# Patient Record
Sex: MALE | Race: WHITE | NOT HISPANIC OR LATINO | Employment: FULL TIME | URBAN - METROPOLITAN AREA
[De-identification: names, ages, dates, MRNs, and addresses within clinical notes are randomized per-mention and may not be internally consistent; named-entity substitution may affect disease eponyms.]

---

## 2018-10-02 ENCOUNTER — TRANSFERRED RECORDS (OUTPATIENT)
Dept: HEALTH INFORMATION MANAGEMENT | Facility: CLINIC | Age: 53
End: 2018-10-02

## 2018-11-07 ENCOUNTER — TRANSFERRED RECORDS (OUTPATIENT)
Dept: HEALTH INFORMATION MANAGEMENT | Facility: CLINIC | Age: 53
End: 2018-11-07

## 2018-12-05 ENCOUNTER — PRE VISIT (OUTPATIENT)
Dept: ORTHOPEDICS | Facility: CLINIC | Age: 53
End: 2018-12-05

## 2018-12-05 NOTE — TELEPHONE ENCOUNTER
FUTURE VISIT INFORMATION      FUTURE VISIT INFORMATION:    Date: 12/10    Time: 11:00    Location: [facility]  REFERRAL INFORMATION:    Referring provider:      Referring providers clinic:      Reason for visit/diagnosis  L knee    RECORDS REQUESTED FROM:       Clinic name Comments Records Status Imaging Status   [facility]  received Pt will hand carry imaging                                     12/5- called pt and left vm at 1:30 regarding appt since there are no appt notes  Pt returned phone call at 2:05. Spoke to pt regarding his appt. He states he has a long history of L knee issues and coming in for a second opinion. He has records from [place] that he will fax in and an imaging disc that he just received and will bring with him.    RECORDS RECEIVED FROM: [facility]   DATE RECEIVED: 12/5   NOTES STATUS DETAILS   OFFICE NOTE from referring provider Received 11/7/18, 9/25/18, 5/29/14   INJECTIONS DONE IN RADIOLOGY N/A    MRI Received 10/2/18   CT SCAN N/A    XRAYS (IMAGES & REPORTS) Received 5/29/14, 9/25/18   TUMOR     PATHOLOGY  Slides & report N/A

## 2018-12-10 ENCOUNTER — OFFICE VISIT (OUTPATIENT)
Dept: ORTHOPEDICS | Facility: CLINIC | Age: 53
End: 2018-12-10
Payer: COMMERCIAL

## 2018-12-10 VITALS — WEIGHT: 170 LBS | BODY MASS INDEX: 25.18 KG/M2 | HEIGHT: 69 IN

## 2018-12-10 DIAGNOSIS — M25.562 CHRONIC PAIN OF LEFT KNEE: Primary | ICD-10-CM

## 2018-12-10 DIAGNOSIS — G89.29 CHRONIC PAIN OF LEFT KNEE: Primary | ICD-10-CM

## 2018-12-10 RX ORDER — SIMVASTATIN 10 MG
TABLET ORAL
COMMUNITY
Start: 2018-11-28 | End: 2019-10-28

## 2018-12-10 RX ORDER — LIDOCAINE HYDROCHLORIDE 5 MG/ML
8 INJECTION, SOLUTION INFILTRATION; INTRAVENOUS
Status: DISCONTINUED | OUTPATIENT
Start: 2018-12-10 | End: 2019-10-28

## 2018-12-10 RX ORDER — TRIAMCINOLONE ACETONIDE 40 MG/ML
40 INJECTION, SUSPENSION INTRA-ARTICULAR; INTRAMUSCULAR
Status: DISCONTINUED | OUTPATIENT
Start: 2018-12-10 | End: 2019-10-28

## 2018-12-10 RX ADMIN — LIDOCAINE HYDROCHLORIDE 8 ML: 5 INJECTION, SOLUTION INFILTRATION; INTRAVENOUS at 11:33

## 2018-12-10 RX ADMIN — TRIAMCINOLONE ACETONIDE 40 MG: 40 INJECTION, SUSPENSION INTRA-ARTICULAR; INTRAMUSCULAR at 11:33

## 2018-12-10 ASSESSMENT — ENCOUNTER SYMPTOMS
TASTE DISTURBANCE: 0
SMELL DISTURBANCE: 0
NECK MASS: 0

## 2018-12-10 ASSESSMENT — MIFFLIN-ST. JEOR: SCORE: 1606.49

## 2018-12-10 NOTE — PROGRESS NOTES
Large Joint Injection/Arthocentesis: L knee joint  Date/Time: 12/10/2018 11:33 AM  Performed by: Bobby Underwood MD  Authorized by: Bobby Underwood MD     Needle Size:  22 G  Guidance: landmark guided    Approach:  Anterolateral  Location:  Knee  Site:  L knee joint  Medications:  40 mg triamcinolone 40 MG/ML; 8 mL lidocaine (PF) 0.5 %  Outcome:  Tolerated well, no immediate complications  Procedure discussed: discussed risks, benefits, and alternatives    Timeout: timeout called immediately prior to procedure    Prep: patient was prepped and draped in usual sterile fashion     Scribed by Jessica Watt ATC for Dr. Underwood on 12/10/18 at 11:30AM, based on the provider s statements to me.

## 2018-12-10 NOTE — NURSING NOTE
[address] [address]  Dept: [phone]  ______________________________________________________________________________    Patient: Lasha Sims   : 1965   MRN: [id number]   December 10, 2018    INVASIVE PROCEDURE SAFETY CHECKLIST    Date: 12/10/18   Procedure: Left knee CSI with Kenalog  Patient Name: Lasha Sims  MRN: [id number]  YOB: 1965    Action: Complete sections as appropriate. Any discrepancy results in a HARD COPY until resolved.     PRE PROCEDURE:  Patient ID verified with 2 identifiers (name and  or MRN): Yes  Procedure and site verified with patient/designee (when able): Yes  Accurate consent documentation in medical record: Yes  H&P (or appropriate assessment) documented in medical record: Yes  H&P must be up to 20 days prior to procedure and updates within 24 hours of procedure as applicable: NA  Relevant diagnostic and radiology test results appropriately labeled and displayed as applicable: Yes  Procedure site(s) marked with provider initials: NA    TIMEOUT:  Time-Out performed immediately prior to starting procedure, including verbal and active participation of all team members addressing the following:Yes  * Correct patient identify  * Confirmed that the correct side and site are marked  * An accurate procedure consent form  * Agreement on the procedure to be done  * Correct patient position  * Relevant images and results are properly labeled and appropriately displayed  * The need to administer antibiotics or fluids for irrigation purposes during the procedure as applicable   * Safety precautions based on patient history or medication use    DURING PROCEDURE: Verification of correct person, site, and procedures any time the responsibility for care of the patient is transferred to another member of the care team.     Prior to injection, verified patient identity using patient's name and date of  birth.  Due to injection administration, patient instructed to remain in clinic for 15 minutes  afterwards, and to report any adverse reaction to me immediately.    Joint injection was performed.      Drug Amount Wasted:  42 of 50mg Lidocaine wasted  Vial/Syringe: Single dose vial        Jessica Watt, DORITA  December 10, 2018

## 2018-12-10 NOTE — PROGRESS NOTES
"CHIEF CONCERN: Left knee pain    HISTORY:   Very pleasant 53-year-old male with a long history regarding his left knee he underwent a bone tendon bone autograft ACL reconstruction in 2003 he recovered and did very well from the surgery.  And has been very functional for the last 14 or 15 years.  He notes a one-year history of increasing knee pain.  He ultimately discussed his case with an outside orthopedic provider who diagnosed him with both osteoarthritis is well as a meniscus tear discussion regarding injections as well as therapy was had though he never did have an injection.  He ultimately obtained an MRI.  And we presents to my clinic today to get a second opinion about what can be done for his knee.  He enjoys a very active and healthy lifestyle and he wants to promote the long-term health of his knee.  His SANE score is an 85 in regards to function in a 60 in regards to pain.  He does not let his knee pain stop him but he does \"pay for it\" after an active workout session    PAST MEDICAL HISTORY: (Reviewed with the patient and in the EPIC medical record)  High cholesterol  Anxiety    PAST SURGICAL HISTORY: (Reviewed with the patient and in the EPIC medical record)  1. ACL reconstruction in 2003    MEDICATIONS: (Reviewed with the patient and in the EPIC medical record)    2. Notable medications include:   Zocor  Fluoxetine    ALLERGIES: (Reviewed with the patient and in the EPIC medical record)  1. 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  He is very active.    FAMILY HISTORY: (Reviewed with the patient and in the medical record)  -- No family history of bleeding, clotting, or difficulty with anesthesia        REVIEW OF SYSTEMS: (Reviewed with the patient and on the health intake form)  -- A comprehensive 10 point " review of systems was conducted and is negative except as noted in the HPI    EXAM:     General: Awake, Alert and Oriented, No acute Distress. Articulate and Interactive    Body mass index is 25.1 kg/m .    Left lower extremity :    Skin is Warm and Well perfused, no suggestion of infection    Well-healed surgical incisions from her BTB ACL reconstruction    No significant effusion today though admits it does get swollen    Trace medial and lateral joint line tenderness.  Does not localize    0-145 degrees    Stable to varus and valgus stress testing stable anterior and posterior drawer.  Lachman 1A with increased translation firm endpoint.  No pivot shift    EHL/FHL/TA/GS 5/5    Sensation intact L3-S1    2+ Dorsalis Pedis Pulse         IMAGING:    Plain Radiographs: AP and lateral radiograph show well-maintained joint spaces tunnels from ACL reconstruction are noted    MRI: MRI does showed some diffuse tricompartmental chondrosis most involve the patellofemoral compartment.  Intrasubstance degeneration is noted the medial meniscus though no displaced fragments.  ACL graft is intact.    ASSESSMENT:  1. Early tricompartmental arthritis  2. Intrasubstance degeneration of the meniscus  3. Intact ACL graft without instability    PLAN:  1. I had a long discussion with the patient I explained these 3 diagnoses and how they potentially could contribute to his knee pain.  2. I understand that he is having some locking catching symptoms and I do not think it is exactly clear what this is coming from  3. I discussed and the importance of keeping his muscles strong, keeping his joints moving keeping his weight at a good level and not smoking  4. I offered him a corticosteroid injection able to try it.  5. He understands that if he does not see lasting benefit we could ultimately consider arthroscopic evaluation the future though would not likely improve the portion of his symptoms coming from his cartilage loss    After  written informed consent obtained and signed, after sufficient prepping and sterile technique, 40 mg of kenalog and , 8 cc of 1% lidocaine were injected without complication into the left knee. The patient tolerated the injection well and a sterile dressing was applied.

## 2018-12-10 NOTE — LETTER
12/10/2018       RE: Lasha Sims  [address]  [place] [facility] [id number]     Dear Colleague,    Thank you for referring your patient, Lasha Sims, to the [facility]. Please see a copy of my visit note below.    Large Joint Injection/Arthocentesis: L knee joint  Date/Time: 12/10/2018 11:33 AM  Performed by: Bobby Underwood MD  Authorized by: Bobby Underwood MD     Needle Size:  22 G  Guidance: landmark guided    Approach:  Anterolateral  Location:  Knee  Site:  L knee joint  Medications:  40 mg triamcinolone 40 MG/ML; 8 mL lidocaine (PF) 0.5 %  Outcome:  Tolerated well, no immediate complications  Procedure discussed: discussed risks, benefits, and alternatives    Timeout: timeout called immediately prior to procedure    Prep: patient was prepped and draped in usual sterile fashion     Scribed by Jessica Watt ATC for Dr. Underwood on 12/10/18 at 11:30AM, based on the provider s statements to me.            CHIEF CONCERN: Left knee pain    HISTORY:   Very pleasant 53-year-old male with a long history regarding his left knee he underwent a bone tendon bone autograft ACL reconstruction in 2003 he recovered and did very well from the surgery.  And has been very functional for the last 14 or 15 years.  He notes a one-year history of increasing knee pain.  He ultimately discussed his case with an outside orthopedic provider who diagnosed him with both osteoarthritis is well as a meniscus tear discussion regarding injections as well as therapy was had though he never did have an injection.  He ultimately obtained an MRI.  And we presents to my clinic today to get a second opinion about what can be done for his knee.  He enjoys a very active and healthy lifestyle and he wants to promote the long-term health of his knee.  His SANE score is an 85 in regards to function in a 60 in regards to pain.  He does not let his knee pain  "stop him but he does \"pay for it\" after an active workout session    PAST MEDICAL HISTORY: (Reviewed with the patient and in the EPIC medical record)  High cholesterol  Anxiety    PAST SURGICAL HISTORY: (Reviewed with the patient and in the EPIC medical record)  1. ACL reconstruction in 2003    MEDICATIONS: (Reviewed with the patient and in the [facility] medical record)    2. Notable medications include:   Zocor  Fluoxetine    ALLERGIES: (Reviewed with the patient and in the EPIC medical record)  1. 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  He is very active.    FAMILY HISTORY: (Reviewed with the patient and in the medical record)  -- No family history of bleeding, clotting, or difficulty with anesthesia        REVIEW OF SYSTEMS: (Reviewed with the patient and on the health intake form)  -- A comprehensive 10 point review of systems was conducted and is negative except as noted in the HPI    EXAM:     General: Awake, Alert and Oriented, No acute Distress. Articulate and Interactive    Body mass index is 25.1 kg/m .    Left lower extremity :    Skin is Warm and Well perfused, no suggestion of infection    Well-healed surgical incisions from her BTB ACL reconstruction    No significant effusion today though admits it does get swollen    Trace medial and lateral joint line tenderness.  Does not localize    0-145 degrees    Stable to varus and valgus stress testing stable anterior and posterior drawer.  Lachman 1A with increased translation firm endpoint.  No pivot shift    EHL/FHL/TA/GS 5/5    Sensation intact L3-S1    2+ Dorsalis Pedis Pulse         IMAGING:    Plain Radiographs: AP and lateral radiograph show well-maintained joint spaces tunnels from ACL reconstruction are noted    MRI: MRI does showed some diffuse " tricompartmental chondrosis most involve the patellofemoral compartment.  Intrasubstance degeneration is noted the medial meniscus though no displaced fragments.  ACL graft is intact.    ASSESSMENT:  1. Early tricompartmental arthritis  2. Intrasubstance degeneration of the meniscus  3. Intact ACL graft without instability    PLAN:  1. I had a long discussion with the patient I explained these 3 diagnoses and how they potentially could contribute to his knee pain.  2. I understand that he is having some locking catching symptoms and I do not think it is exactly clear what this is coming from  3. I discussed and the importance of keeping his muscles strong, keeping his joints moving keeping his weight at a good level and not smoking  4. I offered him a corticosteroid injection able to try it.  5. He understands that if he does not see lasting benefit we could ultimately consider arthroscopic evaluation the future though would not likely improve the portion of his symptoms coming from his cartilage loss    After written informed consent obtained and signed, after sufficient prepping and sterile technique, 40 mg of kenalog and , 8 cc of 1% lidocaine were injected without complication into the left knee. The patient tolerated the injection well and a sterile dressing was applied.      Again, thank you for allowing me to participate in the care of your patient.      Sincerely,    Bobby Underwood MD

## 2019-05-01 ENCOUNTER — DOCUMENTATION ONLY (OUTPATIENT)
Dept: CARE COORDINATION | Facility: CLINIC | Age: 54
End: 2019-05-01

## 2019-05-06 ENCOUNTER — OFFICE VISIT (OUTPATIENT)
Dept: ORTHOPEDICS | Facility: CLINIC | Age: 54
End: 2019-05-06
Payer: COMMERCIAL

## 2019-05-06 DIAGNOSIS — G89.29 CHRONIC PAIN OF LEFT KNEE: Primary | ICD-10-CM

## 2019-05-06 DIAGNOSIS — M25.562 CHRONIC PAIN OF LEFT KNEE: Primary | ICD-10-CM

## 2019-05-06 RX ORDER — LIDOCAINE HYDROCHLORIDE 5 MG/ML
8 INJECTION, SOLUTION INFILTRATION; INTRAVENOUS
Status: DISCONTINUED | OUTPATIENT
Start: 2019-05-06 | End: 2019-10-28

## 2019-05-06 RX ORDER — TRIAMCINOLONE ACETONIDE 40 MG/ML
40 INJECTION, SUSPENSION INTRA-ARTICULAR; INTRAMUSCULAR
Status: DISCONTINUED | OUTPATIENT
Start: 2019-05-06 | End: 2019-10-28

## 2019-05-06 RX ADMIN — TRIAMCINOLONE ACETONIDE 40 MG: 40 INJECTION, SUSPENSION INTRA-ARTICULAR; INTRAMUSCULAR at 07:42

## 2019-05-06 RX ADMIN — LIDOCAINE HYDROCHLORIDE 8 ML: 5 INJECTION, SOLUTION INFILTRATION; INTRAVENOUS at 07:42

## 2019-05-06 NOTE — PROGRESS NOTES
53-year-old male returns to my clinic today for interval follow-up I performed a corticosteroid injection on him in December 2018.  He states that the injection was 'magical' or at least it made his knee feel this way.  Overall he is very happy with it he like a repeat injection today.    Examination shows a pleasant male no acute distress he is articulate and interactive.  He has a trace effusion.  He has reasonable motion.  His ligaments are stable.  He is neurovascularly intact.    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  In light of this I offered him a repeat injection.  He understands that he can have 3 to 4 injections/year.  No lifetime maximum.  He would like to proceed with this.    After written informed consent obtained and signed, after sufficient prepping and sterile technique, 40 mg of kenalog and , 8 cc of 1% lidocaine were injected without complication into the left knee. The patient tolerated the injection well and a sterile dressing was applied.

## 2019-05-06 NOTE — PROGRESS NOTES
Large Joint Injection/Arthocentesis: L knee joint  Date/Time: 5/6/2019 7:42 AM  Performed by: Bobby Underwood MD  Authorized by: Bobby Underwood MD     Needle Size:  22 G  Guidance: landmark guided    Approach:  Anterolateral  Location:  Knee      Medications:  8 mL lidocaine (PF) 0.5 %; 40 mg triamcinolone 40 MG/ML  Outcome:  Tolerated well, no immediate complications  Procedure discussed: discussed risks, benefits, and alternatives    Consent Given by:  Patient  Timeout: timeout called immediately prior to procedure    Prep: patient was prepped and draped in usual sterile fashion     Scribed by Jessica Watt ATC for Dr. Underwood on 5/6/19 at 7:45AM, based on the provider's statements to me.

## 2019-05-06 NOTE — NURSING NOTE
[address] [address]  Dept: [phone]  ______________________________________________________________________________    Patient: Lasha Sims   : 1965   MRN: [id number]   May 6, 2019    INVASIVE PROCEDURE SAFETY CHECKLIST    Date: 19   Procedure: Left knee CSI with Kenalog  Patient Name: Lasha Sims  MRN: [id number]  YOB: 1965    Action: Complete sections as appropriate. Any discrepancy results in a HARD COPY until resolved.     PRE PROCEDURE:  Patient ID verified with 2 identifiers (name and  or MRN): Yes  Procedure and site verified with patient/designee (when able): Yes  Accurate consent documentation in medical record: Yes  H&P (or appropriate assessment) documented in medical record: Yes  H&P must be up to 20 days prior to procedure and updates within 24 hours of procedure as applicable: NA  Relevant diagnostic and radiology test results appropriately labeled and displayed as applicable: Yes  Procedure site(s) marked with provider initials: NA    TIMEOUT:  Time-Out performed immediately prior to starting procedure, including verbal and active participation of all team members addressing the following:Yes  * Correct patient identify  * Confirmed that the correct side and site are marked  * An accurate procedure consent form  * Agreement on the procedure to be done  * Correct patient position  * Relevant images and results are properly labeled and appropriately displayed  * The need to administer antibiotics or fluids for irrigation purposes during the procedure as applicable   * Safety precautions based on patient history or medication use    DURING PROCEDURE: Verification of correct person, site, and procedures any time the responsibility for care of the patient is transferred to another member of the care team.       Prior to injection, verified patient identity using patient's name and date of birth.  Due to  injection administration, patient instructed to remain in clinic for 15 minutes  afterwards, and to report any adverse reaction to me immediately.    Joint injection was performed.      Drug Amount Wasted:  42 of 50mg Lidocaine  Vial/Syringe: Single dose vial  Expiration Date:  4/1/20      Jessica Watt, ATC  May 6, 2019

## 2019-07-15 ENCOUNTER — OFFICE VISIT (OUTPATIENT)
Dept: ORTHOPEDICS | Facility: CLINIC | Age: 54
End: 2019-07-15
Payer: COMMERCIAL

## 2019-07-15 DIAGNOSIS — M25.562 CHRONIC PAIN OF LEFT KNEE: Primary | ICD-10-CM

## 2019-07-15 DIAGNOSIS — G89.29 CHRONIC PAIN OF LEFT KNEE: Primary | ICD-10-CM

## 2019-07-15 RX ORDER — HYALURONATE SODIUM 10 MG/ML
20 SYRINGE (ML) INTRAARTICULAR WEEKLY
Qty: 2 ML | Refills: 0 | OUTPATIENT
Start: 2019-07-15 | End: 2019-10-28

## 2019-07-15 RX ORDER — DICLOFENAC SODIUM 75 MG/1
75 TABLET, DELAYED RELEASE ORAL 2 TIMES DAILY
Qty: 60 TABLET | Refills: 0 | Status: SHIPPED | OUTPATIENT
Start: 2019-07-15 | End: 2019-09-30

## 2019-07-15 NOTE — NURSING NOTE
Reason For Visit:   Chief Complaint   Patient presents with     RECHECK     Left knee     CSI on 12/10/18 with tremendous relief.  CSI on 5/6/19 with no relief.  Wants to know what his next options are.     Pain Assessment  Patient Currently in Pain: Yes  0-10 Pain Scale: 3  Primary Pain Location: Knee    There were no vitals taken for this visit.       No Known Allergies    Current Outpatient Medications   Medication     FLUoxetine (PROZAC) 20 MG capsule     simvastatin (ZOCOR) 10 MG tablet     Current Facility-Administered Medications   Medication     lidocaine (PF) 0.5 % injection SOLN 8 mL     lidocaine (PF) 0.5 % injection SOLN 8 mL     triamcinolone (KENALOG-40) injection 40 mg     triamcinolone (KENALOG-40) injection 40 mg         Jessica Watt, ATC

## 2019-07-15 NOTE — LETTER
7/15/2019       RE: Lasha Sims  [address]  [place] [place] [address]     Dear Colleague,    Thank you for referring your patient, Lasha Sims, to the [facility] at [facility]. Please see a copy of my visit note below.    DIAGNOSIS:   1. Early tricompartmental arthritis   2. Intrasubstance degeneration of the medial meniscus  3. Intact ACL graft without instability    PROCEDURES: no procedures performed today    HISTORY:  Mr. Sims is a 54-year old man with a history of L knee ACL reconstruction in 2003 with BTB patellar graft here today for follow-up on his L knee arthritis, which has been symptomatic for the last three years. Briefly, he has received corticosteroid injections for early tricompartmental arthritis on 12/10/2018 and 5/06/2019. He states the injection in December was extremely helpful, with pain relief lasting for 3 months. His second injection in May did not provide any relief. Since May, he has had persistent and severe pain. He is able to complete his crossfit workouts without issue, but after resting, he experiences excruciating pain on the posterior leg, lateral knee shooting town the tibia, and medial knee. His pain is exacerbated by standing from a seated position. He has been taking 600mg ibuprofen and occasional naproxen, which helps occasionally. Otherwise, he denies changes to his health history. He is here today to speak to Dr. Underwood regarding other options for treatment of his knee pain, as he strongly desires to remain active with crossfit for years to come.     EXAM:     General: Awake, Alert, and oriented. Articulates and communicates with a normal affect     Left Lower Extremity:    Previous incisions well healed    No erythema or effusion appreciated    Tender to palpation along the medial joint line. Painless ROM from 0 to 115 degrees. Patella nontender to palpation. No patellar maltracking.     Lachman and posterior drawer  within normal limits. Stable to varus/valgus stress. Negative McMurrays.     Neurovascularly intact. Ambulates without difficulty.    IMAGING:  Plain Radiographs from 2018: AP and lateral radiograph show well-maintained joint spaces tunnels from ACL reconstruction are noted     MRI from 2018: MRI  shows diffuse tricompartmental chondrosis mostly involving the patellofemoral compartment.  Intrasubstance degeneration is noted the medial meniscus though no displaced fragments.  ACL graft is intact.    ASSESSMENT:  1. Tricompartmental OA  2. Intrasubstance degeneration of medial meniscus  3. Stable ACL graft    PLAN:   1. For his increased pain, we have provided a prescription for diclofenac today. He was instructed to take this twice daily for 2 weeks, and then as needed  2. Because it is too early to do a steroid injection today, we will plan to see him back in approximately 1 month if the diclofenac does not control his pain. We can plan for an injection at this time, and if steroid injection does not help, we will not pursue this any further.  3. If steroid injections and diclofenac fail to control his symptoms, we will pursue additional therapies. We specifically discussed Hyaluronic Acid injections as a possible future option. A prior authorization for Euflexxa was submitted today. Additionally, we discussed PRP injections as an option down the road  4. He should continue his crossfit as tolerated, but recognize that activity modification is a significant component of pain management moving forward.           Again, thank you for allowing me to participate in the care of your patient.      Sincerely,    Bobby Underwood MD

## 2019-07-15 NOTE — PROGRESS NOTES
DIAGNOSIS:   1. Early tricompartmental arthritis   2. Intrasubstance degeneration of the medial meniscus  3. Intact ACL graft without instability    PROCEDURES: no procedures performed today    HISTORY:  Mr. Sims is a 54-year old man with a history of L knee ACL reconstruction in 2003 with BTB patellar graft here today for follow-up on his L knee arthritis, which has been symptomatic for the last three years. Briefly, he has received corticosteroid injections for early tricompartmental arthritis on 12/10/2018 and 5/06/2019. He states the injection in December was extremely helpful, with pain relief lasting for 3 months. His second injection in May did not provide any relief. Since May, he has had persistent and severe pain. He is able to complete his crossfit workouts without issue, but after resting, he experiences excruciating pain on the posterior leg, lateral knee shooting town the tibia, and medial knee. His pain is exacerbated by standing from a seated position. He has been taking 600mg ibuprofen and occasional naproxen, which helps occasionally. Otherwise, he denies changes to his health history. He is here today to speak to Dr. Underwood regarding other options for treatment of his knee pain, as he strongly desires to remain active with crossfit for years to come.     EXAM:     General: Awake, Alert, and oriented. Articulates and communicates with a normal affect     Left Lower Extremity:    Previous incisions well healed    No erythema or effusion appreciated    Tender to palpation along the medial joint line. Painless ROM from 0 to 115 degrees. Patella nontender to palpation. No patellar maltracking.     Lachman and posterior drawer within normal limits. Stable to varus/valgus stress. Negative McMurrays.     Neurovascularly intact. Ambulates without difficulty.    IMAGING:  Plain Radiographs from 2018: AP and lateral radiograph show well-maintained joint spaces tunnels from ACL reconstruction are  noted     MRI from 2018: MRI shows diffuse tricompartmental chondrosis mostly involving the patellofemoral compartment.  Intrasubstance degeneration is noted the medial meniscus though no displaced fragments.  ACL graft is intact.    ASSESSMENT:  1. Tricompartmental OA  2. Intrasubstance degeneration of medial meniscus  3. Stable ACL graft    PLAN:   1. For his increased pain, we have provided a prescription for diclofenac today. He was instructed to take this twice daily for 2 weeks, and then as needed  2. Because it is too early to do a steroid injection today, we will plan to see him back in approximately 1 month if the diclofenac does not control his pain. We can plan for an injection at this time, and if steroid injection does not help, we will not pursue this any further.  3. If steroid injections and diclofenac fail to control his symptoms, we will pursue additional therapies. We specifically discussed Hyaluronic Acid injections as a possible future option. A prior authorization for Euflexxa was submitted today. Additionally, we discussed PRP injections as an option down the road  4. He should continue his crossfit as tolerated, but recognize that activity modification is a significant component of pain management moving forward.

## 2019-09-30 ENCOUNTER — OFFICE VISIT (OUTPATIENT)
Dept: ORTHOPEDICS | Facility: CLINIC | Age: 54
End: 2019-09-30
Payer: COMMERCIAL

## 2019-09-30 DIAGNOSIS — M25.562 CHRONIC PAIN OF LEFT KNEE: ICD-10-CM

## 2019-09-30 DIAGNOSIS — G89.29 CHRONIC PAIN OF LEFT KNEE: ICD-10-CM

## 2019-09-30 RX ORDER — TRIAMCINOLONE ACETONIDE 40 MG/ML
40 INJECTION, SUSPENSION INTRA-ARTICULAR; INTRAMUSCULAR
Status: DISCONTINUED | OUTPATIENT
Start: 2019-09-30 | End: 2019-10-28

## 2019-09-30 RX ORDER — DICLOFENAC SODIUM 75 MG/1
75 TABLET, DELAYED RELEASE ORAL 2 TIMES DAILY
Qty: 60 TABLET | Refills: 0 | Status: SHIPPED | OUTPATIENT
Start: 2019-09-30 | End: 2020-10-20

## 2019-09-30 RX ORDER — LIDOCAINE HYDROCHLORIDE 5 MG/ML
8 INJECTION, SOLUTION INFILTRATION; INTRAVENOUS
Status: DISCONTINUED | OUTPATIENT
Start: 2019-09-30 | End: 2019-10-28

## 2019-09-30 RX ADMIN — LIDOCAINE HYDROCHLORIDE 8 ML: 5 INJECTION, SOLUTION INFILTRATION; INTRAVENOUS at 09:39

## 2019-09-30 RX ADMIN — TRIAMCINOLONE ACETONIDE 40 MG: 40 INJECTION, SUSPENSION INTRA-ARTICULAR; INTRAMUSCULAR at 09:39

## 2019-09-30 NOTE — LETTER
9/30/2019       RE: Lasha Sims  [address]  [facility] [address]     Dear Colleague,    Thank you for referring your patient, Lasha Sims, to the [facility] at [facility]. Please see a copy of my visit note below.          Large Joint Injection/Arthocentesis: L knee joint  Date/Time: 9/30/2019 9:39 AM  Performed by: Bobby Underwood MD  Authorized by: Bobby Underwood MD     Needle Size:  22 G  Guidance: landmark guided    Approach:  Anterolateral  Location:  Knee      Medications:  40 mg triamcinolone 40 MG/ML; 8 mL lidocaine (PF) 0.5 %  Outcome:  Tolerated well, no immediate complications  Procedure discussed: discussed risks, benefits, and alternatives    Consent Given by:  Patient  Timeout: timeout called immediately prior to procedure    Prep: patient was prepped and draped in usual sterile fashion     Scribed by Jessica Watt ATC for Dr. Underwood on 9/30/19 at 9:20AM, based on the provider s statements to me.            Lasha returns to my clinic today for interval follow-up he is a very pleasant 54-year-old male.  I saw him earlier this year.  I told him pretty clearly at that visit that there were not great surgical options for his knee pain.  He does have early tricompartmental arthritis.  He has some degeneration of his meniscus and overall I thought his ACL graft was stable.  So realistically I did not think he needed an ACL reconstruction I was not convinced that an arthroscopic procedure was going to help.  At her last visit we reviewed our surgical and nonsurgical management.  I asked to give a prescription for diclofenac which she is found incredibly helpful along the way.  His prior steroid injection to see me last time was not critically helpful and we made a decision that we would try it one more time since the one that he had before that (in December 2018) was helpful.  He returns to clinic today to get the  corticosteroid injection to his knee.    Examination today shows overall stable needle examination.  Neurovascularly intact.  Trace effusion    Clinical assessment early tricompartmental arthritis 15 years status post ACL reconstruction    Plan:  I gave a refill of his nonsteroidal anti-inflammatory, diclofenac.  And will do a steroid injection today.    After written informed consent obtained and signed, after sufficient prepping and sterile technique, 40 mg of kenalog and , 8 cc of 1% lidocaine were injected without complication into the left knee. The patient tolerated the injection well and a sterile dressing was applied.    If this does not provide him lasting benefit we could consider Visco supplementation or PRP in the future.         Again, thank you for allowing me to participate in the care of your patient.      Sincerely,    Bobby Underwood MD

## 2019-09-30 NOTE — PROGRESS NOTES
Large Joint Injection/Arthocentesis: L knee joint  Date/Time: 9/30/2019 9:39 AM  Performed by: Bobby Underwood MD  Authorized by: Bobby Underwood MD     Needle Size:  22 G  Guidance: landmark guided    Approach:  Anterolateral  Location:  Knee      Medications:  40 mg triamcinolone 40 MG/ML; 8 mL lidocaine (PF) 0.5 %  Outcome:  Tolerated well, no immediate complications  Procedure discussed: discussed risks, benefits, and alternatives    Consent Given by:  Patient  Timeout: timeout called immediately prior to procedure    Prep: patient was prepped and draped in usual sterile fashion     Scribed by Jessica Watt ATC for Dr. Underwood on 9/30/19 at 9:20AM, based on the provider s statements to me.

## 2019-09-30 NOTE — PROGRESS NOTES
Lasha returns to my clinic today for interval follow-up he is a very pleasant 54-year-old male.  I saw him earlier this year.  I told him pretty clearly at that visit that there were not great surgical options for his knee pain.  He does have early tricompartmental arthritis.  He has some degeneration of his meniscus and overall I thought his ACL graft was stable.  So realistically I did not think he needed an ACL reconstruction I was not convinced that an arthroscopic procedure was going to help.  At her last visit we reviewed our surgical and nonsurgical management.  I asked to give a prescription for diclofenac which she is found incredibly helpful along the way.  His prior steroid injection to see me last time was not critically helpful and we made a decision that we would try it one more time since the one that he had before that (in December 2018) was helpful.  He returns to clinic today to get the corticosteroid injection to his knee.    Examination today shows overall stable needle examination.  Neurovascularly intact.  Trace effusion    Clinical assessment early tricompartmental arthritis 15 years status post ACL reconstruction    Plan:  I gave a refill of his nonsteroidal anti-inflammatory, diclofenac.  And will do a steroid injection today.    After written informed consent obtained and signed, after sufficient prepping and sterile technique, 40 mg of kenalog and , 8 cc of 1% lidocaine were injected without complication into the left knee. The patient tolerated the injection well and a sterile dressing was applied.    If this does not provide him lasting benefit we could consider Visco supplementation or PRP in the future.

## 2019-09-30 NOTE — NURSING NOTE
[address] [address]  Dept: [phone]  ______________________________________________________________________________    Patient: Lasha Sims   : 1965   MRN: [id number]   2019    INVASIVE PROCEDURE SAFETY CHECKLIST    Date: 19   Procedure: Left knee CSI with Kenalog  Patient Name: Lasha Sims  MRN: [id number]  YOB: 1965    Action: Complete sections as appropriate. Any discrepancy results in a HARD COPY until resolved.     PRE PROCEDURE:  Patient ID verified with 2 identifiers (name and  or MRN): Yes  Procedure and site verified with patient/designee (when able): Yes  Accurate consent documentation in medical record: Yes  H&P (or appropriate assessment) documented in medical record: Yes  H&P must be up to 20 days prior to procedure and updates within 24 hours of procedure as applicable: NA  Relevant diagnostic and radiology test results appropriately labeled and displayed as applicable: Yes  Procedure site(s) marked with provider initials: NA    TIMEOUT:  Time-Out performed immediately prior to starting procedure, including verbal and active participation of all team members addressing the following:Yes  * Correct patient identify  * Confirmed that the correct side and site are marked  * An accurate procedure consent form  * Agreement on the procedure to be done  * Correct patient position  * Relevant images and results are properly labeled and appropriately displayed  * The need to administer antibiotics or fluids for irrigation purposes during the procedure as applicable   * Safety precautions based on patient history or medication use    DURING PROCEDURE: Verification of correct person, site, and procedures any time the responsibility for care of the patient is transferred to another member of the care team.       Prior to injection, verified patient identity using patient's name and date of  birth.  Due to injection administration, patient instructed to remain in clinic for 15 minutes  afterwards, and to report any adverse reaction to me immediately.    Joint injection was performed.      Drug Amount Wasted:  42 of 50mg Lidocaine wasted  Vial/Syringe: Single dose vial  Expiration Date:  11/22      Jessica Watt [facility]  September 30, 2019

## 2019-10-01 NOTE — NURSING NOTE
Received fax from [employer] requesting 90- day supply for the diclofenac. Okayed 90 day supply with no refills.

## 2019-10-28 ENCOUNTER — OFFICE VISIT (OUTPATIENT)
Dept: PEDIATRICS | Facility: CLINIC | Age: 54
End: 2019-10-28
Payer: COMMERCIAL

## 2019-10-28 VITALS
BODY MASS INDEX: 24.32 KG/M2 | DIASTOLIC BLOOD PRESSURE: 56 MMHG | HEIGHT: 69 IN | OXYGEN SATURATION: 99 % | SYSTOLIC BLOOD PRESSURE: 98 MMHG | WEIGHT: 164.2 LBS | HEART RATE: 56 BPM | TEMPERATURE: 97.6 F

## 2019-10-28 DIAGNOSIS — M17.12 PRIMARY OSTEOARTHRITIS OF LEFT KNEE: ICD-10-CM

## 2019-10-28 DIAGNOSIS — R00.2 PALPITATIONS: ICD-10-CM

## 2019-10-28 DIAGNOSIS — Z00.00 ROUTINE GENERAL MEDICAL EXAMINATION AT A HEALTH CARE FACILITY: Primary | ICD-10-CM

## 2019-10-28 DIAGNOSIS — Z12.5 SCREENING FOR PROSTATE CANCER: ICD-10-CM

## 2019-10-28 DIAGNOSIS — F41.1 GENERALIZED ANXIETY DISORDER: ICD-10-CM

## 2019-10-28 DIAGNOSIS — F32.9 MAJOR DEPRESSION, CHRONIC: ICD-10-CM

## 2019-10-28 DIAGNOSIS — Z23 NEED FOR INFLUENZA VACCINATION: ICD-10-CM

## 2019-10-28 LAB
ALBUMIN SERPL-MCNC: 4 G/DL (ref 3.4–5)
ALP SERPL-CCNC: 74 U/L (ref 40–150)
ALT SERPL W P-5'-P-CCNC: 35 U/L (ref 0–70)
ANION GAP SERPL CALCULATED.3IONS-SCNC: 4 MMOL/L (ref 3–14)
AST SERPL W P-5'-P-CCNC: 8 U/L (ref 0–45)
BILIRUB SERPL-MCNC: 0.8 MG/DL (ref 0.2–1.3)
BUN SERPL-MCNC: 22 MG/DL (ref 7–30)
CALCIUM SERPL-MCNC: 8.9 MG/DL (ref 8.5–10.1)
CHLORIDE SERPL-SCNC: 103 MMOL/L (ref 94–109)
CHOLEST SERPL-MCNC: 294 MG/DL
CO2 SERPL-SCNC: 33 MMOL/L (ref 20–32)
CREAT SERPL-MCNC: 0.99 MG/DL (ref 0.66–1.25)
ERYTHROCYTE [DISTWIDTH] IN BLOOD BY AUTOMATED COUNT: 13 % (ref 10–15)
GFR SERPL CREATININE-BSD FRML MDRD: 86 ML/MIN/{1.73_M2}
GLUCOSE SERPL-MCNC: 108 MG/DL (ref 70–99)
HCT VFR BLD AUTO: 43.4 % (ref 40–53)
HDLC SERPL-MCNC: 56 MG/DL
HGB BLD-MCNC: 14.5 G/DL (ref 13.3–17.7)
LDLC SERPL CALC-MCNC: 209 MG/DL
MCH RBC QN AUTO: 29.6 PG (ref 26.5–33)
MCHC RBC AUTO-ENTMCNC: 33.4 G/DL (ref 31.5–36.5)
MCV RBC AUTO: 89 FL (ref 78–100)
NONHDLC SERPL-MCNC: 238 MG/DL
PLATELET # BLD AUTO: 236 10E9/L (ref 150–450)
POTASSIUM SERPL-SCNC: 4.2 MMOL/L (ref 3.4–5.3)
PROT SERPL-MCNC: 7.2 G/DL (ref 6.8–8.8)
PSA SERPL-ACNC: 0.86 UG/L (ref 0–4)
RBC # BLD AUTO: 4.9 10E12/L (ref 4.4–5.9)
SODIUM SERPL-SCNC: 140 MMOL/L (ref 133–144)
TRIGL SERPL-MCNC: 147 MG/DL
TSH SERPL DL<=0.005 MIU/L-ACNC: 0.81 MU/L (ref 0.4–4)
WBC # BLD AUTO: 3.3 10E9/L (ref 4–11)

## 2019-10-28 PROCEDURE — 90471 IMMUNIZATION ADMIN: CPT | Performed by: INTERNAL MEDICINE

## 2019-10-28 PROCEDURE — 36415 COLL VENOUS BLD VENIPUNCTURE: CPT | Performed by: INTERNAL MEDICINE

## 2019-10-28 PROCEDURE — 80053 COMPREHEN METABOLIC PANEL: CPT | Performed by: INTERNAL MEDICINE

## 2019-10-28 PROCEDURE — 99386 PREV VISIT NEW AGE 40-64: CPT | Mod: 25 | Performed by: INTERNAL MEDICINE

## 2019-10-28 PROCEDURE — 85027 COMPLETE CBC AUTOMATED: CPT | Performed by: INTERNAL MEDICINE

## 2019-10-28 PROCEDURE — 90682 RIV4 VACC RECOMBINANT DNA IM: CPT | Performed by: INTERNAL MEDICINE

## 2019-10-28 PROCEDURE — 84443 ASSAY THYROID STIM HORMONE: CPT | Performed by: INTERNAL MEDICINE

## 2019-10-28 PROCEDURE — G0103 PSA SCREENING: HCPCS | Performed by: INTERNAL MEDICINE

## 2019-10-28 PROCEDURE — 80061 LIPID PANEL: CPT | Performed by: INTERNAL MEDICINE

## 2019-10-28 SDOH — HEALTH STABILITY: MENTAL HEALTH: HOW OFTEN DO YOU HAVE A DRINK CONTAINING ALCOHOL?: MONTHLY OR LESS

## 2019-10-28 ASSESSMENT — MIFFLIN-ST. JEOR: SCORE: 1571.22

## 2019-10-28 NOTE — PROGRESS NOTES
SUBJECTIVE:   CC: Lasha Sims is an 54 year old male who presents for preventive health visit.         Establish Care/Previous patient at [facility] [place] in [place].  States he hasn't had a primary provider in years.        54-year-old gentleman comes in for physical exam and to establish care.  Overall he feels good.  He has history of depression and anxiety which has been under control.  Off-and-on he has felt fast heartbeat.  This is not new.  He was evaluated in 2017 with echocardiogram and ZIO Patch which were normal.  His symptoms are not changed.  He is very active and physically very fit.  He does CrossFit.  He has  some arthritis of the left knee which bothers him.  He had a spontaneous pneumothorax in 2010.  Is no history of smoking or COPD.  No history of alpha-1 antitrypsin deficiency.          Healthy Habits:    Do you get at least three servings of calcium containing foods daily (dairy, green leafy vegetables, etc.)? yes    Amount of exercise or daily activities, outside of work: 6 day(s) per week    Problems taking medications regularly No    Medication side effects: No    Have you had an eye exam in the past two years? yes    Do you see a dentist twice per year? yes    Do you have sleep apnea, excessive snoring or daytime drowsiness?no        Today's PHQ-2 Score:   PHQ-2 ( 1999 [employer]) 10/28/2019 9/30/2019   Q1: Little interest or pleasure in doing things 0 0   Q2: Feeling down, depressed or hopeless 0 0   PHQ-2 Score 0 0   Q1: Little interest or pleasure in doing things - -   Q2: Feeling down, depressed or hopeless - -   PHQ-2 Score - -       Abuse: Current or Past(Physical, Sexual or Emotional)- No  Do you feel safe in your environment? Yes    Social History     Tobacco Use     Smoking status: Never Smoker     Smokeless tobacco: Never Used   Substance Use Topics     Alcohol use: Yes     Frequency: Monthly or less     If you drink alcohol do you typically have >3 drinks per day or >7  drinks per week? No                      Last PSA:   PSA   Date Value Ref Range Status   10/28/2019 0.86 0 - 4 ug/L Final     Comment:     Assay Method:  Chemiluminescence using Siemens Vista analyzer       Reviewed orders with patient. Reviewed health maintenance and updated orders accordingly - Yes  BP Readings from Last 3 Encounters:   10/28/19 98/56    Wt Readings from Last 3 Encounters:   10/28/19 74.5 kg (164 lb 3.2 oz)   12/10/18 77.1 kg (170 lb)                  Patient Active Problem List   Diagnosis     Osteoarthritis of left knee     Major depression, chronic     Generalized anxiety disorder     Past Surgical History:   Procedure Laterality Date     APPENDECTOMY OPEN N/A      HERNIA REPAIR Left      KNEE SURGERY Left 2003    ACL repair and partioal lateral manisectomy     THORACOSCOPY Right 10/19/2010    for pneumothorax. upper lobe wedge resection       Social History     Tobacco Use     Smoking status: Never Smoker     Smokeless tobacco: Never Used   Substance Use Topics     Alcohol use: Yes     Frequency: Monthly or less     Family History   Problem Relation Age of Onset     Multiple myeloma Father 60     Prostate Cancer Brother      Brain Cancer Paternal Grandfather 60     Depression Daughter          Current Outpatient Medications   Medication Sig Dispense Refill     diclofenac (VOLTAREN) 75 MG EC tablet Take 1 tablet (75 mg) by mouth 2 times daily 60 tablet 0     FLUoxetine (PROZAC) 20 MG capsule        No Known Allergies  Recent Labs   Lab Test 10/28/19  0807   *   HDL 56   TRIG 147   ALT 35   CR 0.99   GFRESTIMATED 86   GFRESTBLACK >90   POTASSIUM 4.2   TSH 0.81        Reviewed and updated as needed this visit by clinical staff  Tobacco  Allergies  Meds  Med Hx  Surg Hx  Fam Hx  Soc Hx        Reviewed and updated as needed this visit by Provider  Tobacco  Soc Hx       Past Medical History:   Diagnosis Date     Generalized anxiety disorder 1990     Major depression, chronic      Mild  "hyperlipidemia      Osteoarthritis of left knee      Spontaneous pneumothorax 10/2010    Right side      Past Surgical History:   Procedure Laterality Date     APPENDECTOMY OPEN N/A      HERNIA REPAIR Left      KNEE SURGERY Left 2003    ACL repair and partioal lateral manisectomy     THORACOSCOPY Right 10/19/2010    for pneumothorax. upper lobe wedge resection       ROS:  CONSTITUTIONAL: NEGATIVE for fever, chills, change in weight  INTEGUMENTARY/SKIN: NEGATIVE for worrisome rashes, moles or lesions  EYES: NEGATIVE for vision changes or irritation  ENT: NEGATIVE for ear, mouth and throat problems  RESP: NEGATIVE for significant cough or SOB  CV: NEGATIVE for chest pain, palpitations or peripheral edema  GI: NEGATIVE for nausea, abdominal pain, heartburn, or change in bowel habits   male: negative for dysuria, hematuria, decreased urinary stream, erectile dysfunction, urethral discharge  MUSCULOSKELETAL: NEGATIVE for significant arthralgias or myalgia  NEURO: NEGATIVE for weakness, dizziness or paresthesias  ENDOCRINE: NEGATIVE for temperature intolerance, skin/hair changes  HEME/ALLERGY/IMMUNE: NEGATIVE for bleeding problems  PSYCHIATRIC: NEGATIVE for changes in mood or affect    OBJECTIVE:   BP 98/56 (BP Location: Right arm, Patient Position: Sitting, Cuff Size: Adult Regular)   Pulse 56   Temp 97.6  F (36.4  C) (Temporal)   Ht 1.746 m (5' 8.75\")   Wt 74.5 kg (164 lb 3.2 oz)   SpO2 99%   BMI 24.42 kg/m    EXAM:  GENERAL: healthy, alert and no distress  EYES: Eyes grossly normal to inspection, PERRL and conjunctivae and sclerae normal  HENT: ear canals and TM's normal, nose and mouth without ulcers or lesions  NECK: no adenopathy, no asymmetry, masses, or scars and thyroid normal to palpation  RESP: lungs clear to auscultation - no rales, rhonchi or wheezes  CV: regular rate and rhythm, normal S1 S2, no S3 or S4, no murmur, click or rub, no peripheral edema and peripheral pulses strong  ABDOMEN: soft, " "nontender, no hepatosplenomegaly, no masses and bowel sounds normal   (male): normal male genitalia without lesions or urethral discharge, no hernia  RECTAL: normal sphincter tone, no rectal masses, prostate normal size, smooth, nontender without nodules or masses  MS: no gross musculoskeletal defects noted, no edema  SKIN: no suspicious lesions or rashes  NEURO: Normal strength and tone, mentation intact and speech normal  PSYCH: mentation appears normal, affect normal/bright  LYMPH: no cervical, supraclavicular, axillary, or inguinal adenopathy    Diagnostic Test Results:  Labs reviewed in Epic    ASSESSMENT/PLAN:     1.  Physical examination performed and is good.  2.  History of generalized anxiety disorder under control with Prozac.  3.  Major depression disorder well-controlled with Prilosec 20 mg a day.  4.  Left knee osteoarthritis with status post ACL repair in 2003 with partial lateral meniscectomy.  5.  Status post spontaneous right pneumothorax treated with thoracoscopy and upper lobe wedge resection on 10/19/2010.  6.  Symptom of palpitation off and on.  Negative work-up in 2017.  Had an normal echocardiogram on 7/24/2019.  Normal Zio patch.  Chest x-ray negative.  This symptom is most likely anxiety related.    We will check PSA, lipids, CBC, CMP and TSH.  I will get back to the results.  Patient's exam was great.        COUNSELING:  Reviewed preventive health counseling, as reflected in patient instructions       Regular exercise       Healthy diet/nutrition       Vision screening    Estimated body mass index is 24.42 kg/m  as calculated from the following:    Height as of this encounter: 1.746 m (5' 8.75\").    Weight as of this encounter: 74.5 kg (164 lb 3.2 oz).         reports that he has never smoked. He has never used smokeless tobacco.      Counseling Resources:  ATP IV Guidelines  Pooled Cohorts Equation Calculator  FRAX Risk Assessment  ICSI Preventive Guidelines  Dietary Guidelines for " Americans, 2010  USDA's MyPlate  ASA Prophylaxis  Lung CA Screening    Pal Wilks MD  [facility]

## 2019-10-28 NOTE — LETTER
October 28, 2019      Lasha Sims  [address]  [place] [address]        Dear ,    We are writing to inform you of your test results.    The thyroid test and PSA which is a marker for prostate cancer are within normal range.  Liver function and kidney function is good.  Electrodes are normal.  Your fasting blood sugar is 108 which is borderline elevated.  It is in the prediabetes range.  A value below 100 is considered normal.  Your white count is low at 3.3.  Hemoglobin and platelet count are fine.  The cause for low white count is unclear.  Your total cholesterol is high at 294 which preferably should be below 200.  Within the cholesterol the bad cholesterol LDL is high at 209.  LDL below 100 is considered good.  Once it gets above 160 we recommend close reducing medication.    I think it be best if you make an appointment to see me to discuss your cholesterol problem and also have additional blood test to look at the white count.  I would do CBC with differential count and also check A1c which measures the average blood sugar over the past 3 months.    Resulted Orders   TSH   Result Value Ref Range    TSH 0.81 0.40 - 4.00 mU/L   CBC with platelets   Result Value Ref Range    WBC 3.3 (L) 4.0 - 11.0 10e9/L    RBC Count 4.90 4.4 - 5.9 10e12/L    Hemoglobin 14.5 13.3 - 17.7 g/dL    Hematocrit 43.4 40.0 - 53.0 %    MCV 89 78 - 100 fl    MCH 29.6 26.5 - 33.0 pg    MCHC 33.4 31.5 - 36.5 g/dL    RDW 13.0 10.0 - 15.0 %    Platelet Count 236 150 - 450 10e9/L   Comprehensive metabolic panel   Result Value Ref Range    Sodium 140 133 - 144 mmol/L    Potassium 4.2 3.4 - 5.3 mmol/L    Chloride 103 94 - 109 mmol/L    Carbon Dioxide 33 (H) 20 - 32 mmol/L    Anion Gap 4 3 - 14 mmol/L    Glucose 108 (H) 70 - 99 mg/dL      Comment:      Fasting specimen    Urea Nitrogen 22 7 - 30 mg/dL    Creatinine 0.99 0.66 - 1.25 mg/dL    GFR Estimate 86 >60 mL/min/[1.73_m2]      Comment:      Non  GFR  Calc  Starting 12/18/2018, serum creatinine based estimated GFR (eGFR) will be   calculated using the Chronic Kidney Disease Epidemiology Collaboration   (CKD-EPI) equation.      GFR Estimate If Black >90 >60 mL/min/[1.73_m2]      Comment:       GFR Calc  Starting 12/18/2018, serum creatinine based estimated GFR (eGFR) will be   calculated using the Chronic Kidney Disease Epidemiology Collaboration   (CKD-EPI) equation.      Calcium 8.9 8.5 - 10.1 mg/dL    Bilirubin Total 0.8 0.2 - 1.3 mg/dL    Albumin 4.0 3.4 - 5.0 g/dL    Protein Total 7.2 6.8 - 8.8 g/dL    Alkaline Phosphatase 74 40 - 150 U/L    ALT 35 0 - 70 U/L    AST 8 0 - 45 U/L   Lipid Profile   Result Value Ref Range    Cholesterol 294 (H) <200 mg/dL      Comment:      Desirable:       <200 mg/dl    Triglycerides 147 <150 mg/dL      Comment:      Fasting specimen    HDL Cholesterol 56 >39 mg/dL    LDL Cholesterol Calculated 209 (H) <100 mg/dL      Comment:      Above desirable:  100-129 mg/dl  Borderline High:  130-159 mg/dL  High:             160-189 mg/dL  Very high:       >189 mg/dl      Non HDL Cholesterol 238 (H) <130 mg/dL      Comment:      Above Desirable:  130-159 mg/dl  Borderline high:  160-189 mg/dl  High:             190-219 mg/dl  Very high:       >219 mg/dl     Prostate spec antigen screen   Result Value Ref Range    PSA 0.86 0 - 4 ug/L      Comment:      Assay Method:  Chemiluminescence using Siemens Vista analyzer       If you have any questions or concerns, please call the clinic at the number listed above.       Sincerely,        Pal Wilks MD

## 2019-10-30 ENCOUNTER — MYC MEDICAL ADVICE (OUTPATIENT)
Dept: PEDIATRICS | Facility: CLINIC | Age: 54
End: 2019-10-30

## 2019-10-31 NOTE — TELEPHONE ENCOUNTER
Routing to provider to review and advise.    Rashmi Pepper, RN, BSN  [facility]

## 2019-10-31 NOTE — TELEPHONE ENCOUNTER
I have called pt and left provider message for pt to call clinic and schedule an appointment. Oriana WINKLER CMA

## 2019-11-13 ENCOUNTER — OFFICE VISIT (OUTPATIENT)
Dept: PEDIATRICS | Facility: CLINIC | Age: 54
End: 2019-11-13
Payer: COMMERCIAL

## 2019-11-13 VITALS
BODY MASS INDEX: 24.14 KG/M2 | SYSTOLIC BLOOD PRESSURE: 104 MMHG | HEIGHT: 69 IN | DIASTOLIC BLOOD PRESSURE: 62 MMHG | WEIGHT: 163 LBS | OXYGEN SATURATION: 97 % | HEART RATE: 67 BPM | TEMPERATURE: 98.5 F

## 2019-11-13 DIAGNOSIS — E78.00 HYPERCHOLESTEREMIA: ICD-10-CM

## 2019-11-13 DIAGNOSIS — D72.819 LEUKOPENIA, UNSPECIFIED TYPE: Primary | ICD-10-CM

## 2019-11-13 PROCEDURE — 99214 OFFICE O/P EST MOD 30 MIN: CPT | Performed by: INTERNAL MEDICINE

## 2019-11-13 RX ORDER — SIMVASTATIN 10 MG
10 TABLET ORAL AT BEDTIME
Qty: 90 TABLET | Refills: 0 | COMMUNITY
Start: 2019-11-13 | End: 2020-10-20

## 2019-11-13 ASSESSMENT — MIFFLIN-ST. JEOR: SCORE: 1565.77

## 2019-11-13 NOTE — PROGRESS NOTES
Subjective     Lasha Sims is a 54 year old male who presents to clinic today for the following health issues:    HPI     FOLLOW UP ON RECENT PHYSICAL AND BLOOD TESTS DONE ON 10/28/19.    Patient is here today to discuss his cholesterol problem and also have additional blood tests to look at low white count. Per 10/28/19 letter patient received from Dr. Wilks.     How many servings of fruits and vegetables do you eat daily?  0-1    On average, how many sweetened beverages do you drink each day (soda, juice, sweet tea, etc)?   0    How many days per week do you miss taking your medication? 0    Patient came in to discuss his recent lab.  His white count was 3.3.  His cholesterol elevated at 294 with LDL of 209.  He has come to discuss treatment options and follow-up plan.  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  He is currently being on the low-carb high fat diet.  He has no symptoms.  In the past he had been on low-dose simvastatin but quit at the start of this year.    Patient Active Problem List   Diagnosis     Osteoarthritis of left knee     Major depression, chronic     Generalized anxiety disorder     Hypercholesteremia     Past Surgical History:   Procedure Laterality Date     APPENDECTOMY OPEN N/A      HERNIA REPAIR Left      KNEE SURGERY Left 2003    ACL repair and partioal lateral manisectomy     THORACOSCOPY Right 10/19/2010    for pneumothorax. upper lobe wedge resection       Social History     Tobacco Use     Smoking status: Never Smoker     Smokeless tobacco: Never Used   Substance Use Topics     Alcohol use: Yes     Frequency: Monthly or less     Family History   Problem Relation Age of Onset     Multiple myeloma Father 60     Prostate Cancer Brother      Brain Cancer Paternal Grandfather 60     Depression Daughter          Current Outpatient Medications   Medication Sig Dispense Refill     FLUoxetine (PROZAC) 20 MG capsule        simvastatin (ZOCOR) 10 MG tablet Take 1 tablet (10 mg) by  "mouth At Bedtime 90 tablet 0     diclofenac (VOLTAREN) 75 MG EC tablet Take 1 tablet (75 mg) by mouth 2 times daily 60 tablet 0     No Known Allergies  Recent Labs   Lab Test 10/28/19  0807   *   HDL 56   TRIG 147   ALT 35   CR 0.99   GFRESTIMATED 86   GFRESTBLACK >90   POTASSIUM 4.2   TSH 0.81      BP Readings from Last 3 Encounters:   11/13/19 104/62   10/28/19 98/56    Wt Readings from Last 3 Encounters:   11/13/19 73.9 kg (163 lb)   10/28/19 74.5 kg (164 lb 3.2 oz)   12/10/18 77.1 kg (170 lb)                      Reviewed and updated as needed this visit by Provider         Review of Systems   ROS COMP: Constitutional, HEENT, cardiovascular, pulmonary, GI, , musculoskeletal, neuro, skin, endocrine and psych systems are negative, except as otherwise noted.      Objective    /62 (BP Location: Right arm, Patient Position: Sitting, Cuff Size: Adult Regular)   Pulse 67   Temp 98.5  F (36.9  C) (Temporal)   Ht 1.746 m (5' 8.75\")   Wt 73.9 kg (163 lb)   SpO2 97%   BMI 24.25 kg/m    Body mass index is 24.25 kg/m .  Physical Exam   GENERAL: healthy, alert and no distress    Diagnostic Test Results:  Labs reviewed in Epic  No results found for any visits on 11/13/19.        Assessment & Plan     1.  Hypercholesterolemia.  I recommend statin therapy.  Patient wanted to understand the reason behind and the importance of taking statin therapy in his situation.  We had a lengthy discussion and he then decided to take simvastatin 10 mg a day.  He was previously on that dose.  He will return and have lipids checked in 2 months.  I will get back to the results.  2.  Leukopenia with a WBC count of 3.3 on recent lab.  Recommend checking CBC with differential count in couple of months when he comes in for his lipids.    I will get back to the patient with the above-mentioned labs and recommend follow-up based on the results.  Patient agreeable with the plan.           Return in about 2 months (around 1/20/2020) " for Lab only.    Pal Wilks MD  [facility]

## 2020-01-22 DIAGNOSIS — D72.819 LEUKOPENIA, UNSPECIFIED TYPE: ICD-10-CM

## 2020-01-22 DIAGNOSIS — E78.00 HYPERCHOLESTEREMIA: ICD-10-CM

## 2020-01-22 LAB
BASOPHILS # BLD AUTO: 0 10E9/L (ref 0–0.2)
BASOPHILS NFR BLD AUTO: 0.9 %
CHOLEST SERPL-MCNC: 191 MG/DL
DIFFERENTIAL METHOD BLD: ABNORMAL
EOSINOPHIL # BLD AUTO: 0.1 10E9/L (ref 0–0.7)
EOSINOPHIL NFR BLD AUTO: 3.1 %
ERYTHROCYTE [DISTWIDTH] IN BLOOD BY AUTOMATED COUNT: 12.3 % (ref 10–15)
HCT VFR BLD AUTO: 38.4 % (ref 40–53)
HDLC SERPL-MCNC: 49 MG/DL
HGB BLD-MCNC: 13 G/DL (ref 13.3–17.7)
IMM GRANULOCYTES # BLD: 0 10E9/L (ref 0–0.4)
IMM GRANULOCYTES NFR BLD: 0.3 %
LDLC SERPL CALC-MCNC: 121 MG/DL
LYMPHOCYTES # BLD AUTO: 1.3 10E9/L (ref 0.8–5.3)
LYMPHOCYTES NFR BLD AUTO: 41.4 %
MCH RBC QN AUTO: 29.7 PG (ref 26.5–33)
MCHC RBC AUTO-ENTMCNC: 33.9 G/DL (ref 31.5–36.5)
MCV RBC AUTO: 88 FL (ref 78–100)
MONOCYTES # BLD AUTO: 0.3 10E9/L (ref 0–1.3)
MONOCYTES NFR BLD AUTO: 9 %
NEUTROPHILS # BLD AUTO: 1.5 10E9/L (ref 1.6–8.3)
NEUTROPHILS NFR BLD AUTO: 45.3 %
NONHDLC SERPL-MCNC: 142 MG/DL
PLATELET # BLD AUTO: 205 10E9/L (ref 150–450)
RBC # BLD AUTO: 4.37 10E12/L (ref 4.4–5.9)
TRIGL SERPL-MCNC: 107 MG/DL
WBC # BLD AUTO: 3.2 10E9/L (ref 4–11)

## 2020-01-22 PROCEDURE — 80061 LIPID PANEL: CPT | Performed by: INTERNAL MEDICINE

## 2020-01-22 PROCEDURE — 85025 COMPLETE CBC W/AUTO DIFF WBC: CPT | Performed by: INTERNAL MEDICINE

## 2020-01-22 PROCEDURE — 36415 COLL VENOUS BLD VENIPUNCTURE: CPT | Performed by: INTERNAL MEDICINE

## 2020-01-24 ENCOUNTER — MYC MEDICAL ADVICE (OUTPATIENT)
Dept: PEDIATRICS | Facility: CLINIC | Age: 55
End: 2020-01-24

## 2020-01-27 NOTE — TELEPHONE ENCOUNTER
Routing Oxynade message to Dr Wilks  to please review when able.      Scarlett Shearer RN, [facility]

## 2020-02-03 ENCOUNTER — OFFICE VISIT (OUTPATIENT)
Dept: ORTHOPEDICS | Facility: CLINIC | Age: 55
End: 2020-02-03

## 2020-02-03 DIAGNOSIS — G89.29 CHRONIC PAIN OF LEFT KNEE: Primary | ICD-10-CM

## 2020-02-03 DIAGNOSIS — M25.562 CHRONIC PAIN OF LEFT KNEE: Primary | ICD-10-CM

## 2020-02-03 RX ORDER — TRIAMCINOLONE ACETONIDE 40 MG/ML
40 INJECTION, SUSPENSION INTRA-ARTICULAR; INTRAMUSCULAR
Status: DISCONTINUED | OUTPATIENT
Start: 2020-02-03 | End: 2021-03-04

## 2020-02-03 RX ORDER — LIDOCAINE HYDROCHLORIDE 5 MG/ML
8 INJECTION, SOLUTION INFILTRATION; INTRAVENOUS
Status: DISCONTINUED | OUTPATIENT
Start: 2020-02-03 | End: 2021-03-04

## 2020-02-03 RX ADMIN — TRIAMCINOLONE ACETONIDE 40 MG: 40 INJECTION, SUSPENSION INTRA-ARTICULAR; INTRAMUSCULAR at 11:45

## 2020-02-03 RX ADMIN — LIDOCAINE HYDROCHLORIDE 8 ML: 5 INJECTION, SOLUTION INFILTRATION; INTRAVENOUS at 11:45

## 2020-02-03 ASSESSMENT — ENCOUNTER SYMPTOMS
MUSCLE WEAKNESS: 0
BACK PAIN: 0
ARTHRALGIAS: 1
STIFFNESS: 1
MUSCLE CRAMPS: 0
NECK PAIN: 0
MYALGIAS: 0
JOINT SWELLING: 0

## 2020-02-03 NOTE — LETTER
2/3/2020       RE: Lasha Sims  [address]  [place] [facility] [address]     Dear Colleague,    Thank you for referring your patient, Lasha Sims, to the [facility] at [facility]. Please see a copy of my visit note below.    Lasha Sims returns to my clinic today for interval follow-up he is a very pleasant 54-year-old male.  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  He is status post ACL reconstruction in the past.  We felt that his ACL graft is intact.  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  We have previously discussed potential treatment options including injection management versus arthroscopic evaluation.  He had a new pain that seems to become more intense on the medial joint line.  In light of this he has questions today regarding potential surgery as well as injection management.    Exam today is largely unchanged.  Lachman is 180.  Stable to varus valgus stress testing stable posterior drawer testing.  Trace pivot glide.  1 quadrant medial lateral translation of patella.  Tilt to neutral.    MRI was reviewed which does show patellofemoral chondrosis was medial compartment chondrosis.  Medial meniscus tear is present.  ACL graft is intact.  Some edema.    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  Reviewed with him his diagnosis potential treatment options given his upcoming competition begin to do a repeat injection today.  I do think he is a candidate for arthroscopic chondroplasty meniscectomy in the future if the injections fail to provide lasting benefit with that said I think exhausting nonsurgical means first is the right answer.  He has his  competition coming up in the middle of March so we really could not do surgery now and still expect him to compete.  He will let us know if he is interested in surgery.  He is asked to return to clinic to continue our discussion regarding surgery.    After written informed consent obtained and signed, after sufficient prepping and sterile technique, 40 mg of kenalog and , 8 cc of 1% lidocaine were injected without complication into the left knee. The patient tolerated the injection well and a sterile dressing was applied.         Large Joint Injection/Arthocentesis: L knee joint  Date/Time: 2/3/2020 11:45 AM  Performed by: Bobby Underwood MD  Authorized by: Bobby Underwood MD     Needle Size:  22 G  Guidance: landmark guided    Approach:  Anterolateral  Location:  Knee      Medications:  8 mL lidocaine (PF) 0.5 %; 40 mg triamcinolone 40 MG/ML  Outcome:  Tolerated well, no immediate complications  Procedure discussed: discussed risks, benefits, and alternatives    Consent Given by:  Patient  Timeout: timeout called immediately prior to procedure    Prep: patient was prepped and draped in usual sterile fashion     Scribed by Jessica Watt ATC for Dr. Underwood on 2/3/20 at 11:40AM, based on the provider s statements to me.        Again, thank you for allowing me to participate in the care of your patient.      Sincerely,    Bobby Underwood MD

## 2020-02-03 NOTE — NURSING NOTE
[address] [address]  Dept: [phone]  ______________________________________________________________________________    Patient: Lasha Sims   : 1965   MRN: [id number]   February 3, 2020    INVASIVE PROCEDURE SAFETY CHECKLIST    Date: 2/3/20   Procedure: Left knee Injection with Kenalog  Patient Name: Lasha Sims  MRN: [id number]  YOB: 1965    Action: Complete sections as appropriate. Any discrepancy results in a HARD COPY until resolved.     PRE PROCEDURE:  Patient ID verified with 2 identifiers (name and  or MRN): Yes  Procedure and site verified with patient/designee (when able): Yes  Accurate consent documentation in medical record: Yes  H&P (or appropriate assessment) documented in medical record: Yes  H&P must be up to 20 days prior to procedure and updates within 24 hours of procedure as applicable: NA  Relevant diagnostic and radiology test results appropriately labeled and displayed as applicable: Yes  Procedure site(s) marked with provider initials: NA    TIMEOUT:  Time-Out performed immediately prior to starting procedure, including verbal and active participation of all team members addressing the following:Yes  * Correct patient identify  * Confirmed that the correct side and site are marked  * An accurate procedure consent form  * Agreement on the procedure to be done  * Correct patient position  * Relevant images and results are properly labeled and appropriately displayed  * The need to administer antibiotics or fluids for irrigation purposes during the procedure as applicable   * Safety precautions based on patient history or medication use    DURING PROCEDURE: Verification of correct person, site, and procedures any time the responsibility for care of the patient is transferred to another member of the care team.       Prior to injection, verified patient identity using patient's name and date of  birth.  Due to injection administration, patient instructed to remain in clinic for 15 minutes  afterwards, and to report any adverse reaction to me immediately.    Joint injection was performed.      Drug Amount Wasted:  42 of 50mg Lidocaine wasted  Vial/Syringe: Single dose vial  Expiration Date:  2/23      Jessica Watt, [facility]  February 3, 2020

## 2020-02-03 NOTE — PROGRESS NOTES
Lasha Sims returns to my clinic today for interval follow-up he is a very pleasant 54-year-old male.  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  He is status post ACL reconstruction in the past.  We felt that his ACL graft is intact.  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  We have previously discussed potential treatment options including injection management versus arthroscopic evaluation.  He had a new pain that seems to become more intense on the medial joint line.  In light of this he has questions today regarding potential surgery as well as injection management.    Exam today is largely unchanged.  Lachman is 180.  Stable to varus valgus stress testing stable posterior drawer testing.  Trace pivot glide.  1 quadrant medial lateral translation of patella.  Tilt to neutral.    MRI was reviewed which does show patellofemoral chondrosis was medial compartment chondrosis.  Medial meniscus tear is present.  ACL graft is intact.  Some edema.    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  Reviewed with him his diagnosis potential treatment options given his upcoming competition begin to do a repeat injection today.  I do think he is a candidate for arthroscopic chondroplasty meniscectomy in the future if the injections fail to provide lasting benefit with that said I think exhausting nonsurgical means first is the right answer.  He has his competition coming up in the middle of March so we really could not do surgery now and still expect him to compete.  He will let us know if he is interested in surgery.  He is asked to return to clinic to continue our discussion regarding surgery.    After written informed consent  obtained and signed, after sufficient prepping and sterile technique, 40 mg of kenalog and , 8 cc of 1% lidocaine were injected without complication into the left knee. The patient tolerated the injection well and a sterile dressing was applied.         Large Joint Injection/Arthocentesis: L knee joint  Date/Time: 2/3/2020 11:45 AM  Performed by: Bobby Underwood MD  Authorized by: Bobby Underwood MD     Needle Size:  22 G  Guidance: landmark guided    Approach:  Anterolateral  Location:  Knee      Medications:  8 mL lidocaine (PF) 0.5 %; 40 mg triamcinolone 40 MG/ML  Outcome:  Tolerated well, no immediate complications  Procedure discussed: discussed risks, benefits, and alternatives    Consent Given by:  Patient  Timeout: timeout called immediately prior to procedure    Prep: patient was prepped and draped in usual sterile fashion     Scribed by Jessica Watt ATC for Dr. Underwood on 2/3/20 at 11:40AM, based on the provider s statements to me.

## 2020-02-06 ENCOUNTER — OFFICE VISIT (OUTPATIENT)
Dept: PEDIATRICS | Facility: CLINIC | Age: 55
End: 2020-02-06
Payer: COMMERCIAL

## 2020-02-06 VITALS
SYSTOLIC BLOOD PRESSURE: 118 MMHG | BODY MASS INDEX: 23.99 KG/M2 | HEART RATE: 60 BPM | DIASTOLIC BLOOD PRESSURE: 70 MMHG | WEIGHT: 162 LBS | HEIGHT: 69 IN | OXYGEN SATURATION: 100 %

## 2020-02-06 DIAGNOSIS — D70.8 OTHER NEUTROPENIA (H): ICD-10-CM

## 2020-02-06 PROCEDURE — 99214 OFFICE O/P EST MOD 30 MIN: CPT | Performed by: INTERNAL MEDICINE

## 2020-02-06 ASSESSMENT — MIFFLIN-ST. JEOR: SCORE: 1561.24

## 2020-02-06 NOTE — PROGRESS NOTES
"Subjective     Lasha Sims is a 54 year old male who presents to clinic today for the following health issues:    HPI   Review lab work    Review lab work done on 01/22/20    Patient has come in his recent lab which showed persistent low white count.  He offers no complaints.  He exercises regularly.  Eats healthy.  Does not smoke.  He has no night sweats.  No fever or chills.  Feels excellent.      Patient Active Problem List   Diagnosis     Osteoarthritis of left knee     Major depression, chronic     Generalized anxiety disorder     Hypercholesteremia     Other neutropenia (H)     Past Surgical History:   Procedure Laterality Date     APPENDECTOMY OPEN N/A      HERNIA REPAIR Left      KNEE SURGERY Left 2003    ACL repair and partioal lateral manisectomy     THORACOSCOPY Right 10/19/2010    for pneumothorax. upper lobe wedge resection       Social History     Tobacco Use     Smoking status: Never Smoker     Smokeless tobacco: Never Used   Substance Use Topics     Alcohol use: Yes     Frequency: Monthly or less     Family History   Problem Relation Age of Onset     Multiple myeloma Father 60     Prostate Cancer Brother      Brain Cancer Paternal Grandfather 60     Depression Daughter          Current Outpatient Medications   Medication Sig Dispense Refill     diclofenac (VOLTAREN) 75 MG EC tablet Take 1 tablet (75 mg) by mouth 2 times daily 60 tablet 0     FLUoxetine (PROZAC) 20 MG capsule        simvastatin (ZOCOR) 10 MG tablet Take 1 tablet (10 mg) by mouth At Bedtime 90 tablet 0     No Known Allergies      Reviewed and updated as needed this visit by Provider         Review of Systems   ROS COMP: Constitutional, HEENT, cardiovascular, pulmonary, GI, , musculoskeletal, neuro, skin, endocrine and psych systems are negative, except as otherwise noted.      Objective    /70 (BP Location: Right arm, Patient Position: Sitting, Cuff Size: Adult Regular)   Pulse 60   Ht 1.746 m (5' 8.75\")   Wt 73.5 kg (162 " lb)   SpO2 100%   BMI 24.10 kg/m    Body mass index is 24.1 kg/m .  Physical Exam   GENERAL: healthy, alert and no distress  NECK: no adenopathy, no asymmetry, masses, or scars and thyroid normal to palpation  RESP: lungs clear to auscultation - no rales, rhonchi or wheezes  CV: regular rate and rhythm, normal S1 S2, no S3 or S4, no murmur, click or rub, no peripheral edema and peripheral pulses strong  ABDOMEN: soft, nontender, no hepatosplenomegaly, no masses and bowel sounds normal  MS: no gross musculoskeletal defects noted, no edema  LYMPH: no cervical, supraclavicular, axillary, or inguinal adenopathy    Diagnostic Test Results:  Labs reviewed in Epic  No results found for any visits on 02/06/20.        Assessment & Plan     1.  Leukopenia since December 2019.  Etiology unclear.  Discussed potential causes including infections which clinically does not have.  Myelodysplastic syndrome to hematological malignancies.  His exam was negative with no evidence of adenopathy or organomegaly.  Patient is asymptomatic.  I recommend repeating CBC with differential along with ESR, sed rate and CMP in April.  If it is still showing evidence of leukopenia then I will refer him to hematology for consultation.  Patient agreeable with the plan.       No follow-ups on file.    Pal Wilks MD  [facility]

## 2020-03-17 ENCOUNTER — MYC MEDICAL ADVICE (OUTPATIENT)
Dept: PEDIATRICS | Facility: CLINIC | Age: 55
End: 2020-03-17

## 2020-03-19 NOTE — TELEPHONE ENCOUNTER
Message routed to provider to review and advise. Called pt other day and scheduled pt per provider advised pt to be seen earlier. Pt received call to be R/S. Please advise if pt is still to come in. Pt scheduled 03/24/2020. Oriana WINKLER CMA

## 2020-03-19 NOTE — TELEPHONE ENCOUNTER
patient is confused because on 3/17 he was told to come in sooner, but today he was told to RS. Please advise.

## 2020-03-24 DIAGNOSIS — D70.8 OTHER NEUTROPENIA (H): ICD-10-CM

## 2020-03-24 LAB
ALBUMIN SERPL-MCNC: 4.4 G/DL (ref 3.4–5)
ALP SERPL-CCNC: 72 U/L (ref 40–150)
ALT SERPL W P-5'-P-CCNC: 57 U/L (ref 0–70)
ANION GAP SERPL CALCULATED.3IONS-SCNC: 4 MMOL/L (ref 3–14)
AST SERPL W P-5'-P-CCNC: 15 U/L (ref 0–45)
BASOPHILS # BLD AUTO: 0 10E9/L (ref 0–0.2)
BASOPHILS NFR BLD AUTO: 0.8 %
BILIRUB SERPL-MCNC: 0.7 MG/DL (ref 0.2–1.3)
BUN SERPL-MCNC: 21 MG/DL (ref 7–30)
CALCIUM SERPL-MCNC: 9.3 MG/DL (ref 8.5–10.1)
CHLORIDE SERPL-SCNC: 105 MMOL/L (ref 94–109)
CO2 SERPL-SCNC: 31 MMOL/L (ref 20–32)
CREAT SERPL-MCNC: 1.08 MG/DL (ref 0.66–1.25)
CRP SERPL-MCNC: <2.9 MG/L (ref 0–8)
DIFFERENTIAL METHOD BLD: ABNORMAL
EOSINOPHIL # BLD AUTO: 0.1 10E9/L (ref 0–0.7)
EOSINOPHIL NFR BLD AUTO: 1.3 %
ERYTHROCYTE [DISTWIDTH] IN BLOOD BY AUTOMATED COUNT: 12.3 % (ref 10–15)
ERYTHROCYTE [SEDIMENTATION RATE] IN BLOOD BY WESTERGREN METHOD: 5 MM/H (ref 0–20)
GFR SERPL CREATININE-BSD FRML MDRD: 77 ML/MIN/{1.73_M2}
GLUCOSE SERPL-MCNC: 129 MG/DL (ref 70–99)
HCT VFR BLD AUTO: 43.9 % (ref 40–53)
HGB BLD-MCNC: 14.4 G/DL (ref 13.3–17.7)
IMM GRANULOCYTES # BLD: 0 10E9/L (ref 0–0.4)
IMM GRANULOCYTES NFR BLD: 0.5 %
LYMPHOCYTES # BLD AUTO: 1.4 10E9/L (ref 0.8–5.3)
LYMPHOCYTES NFR BLD AUTO: 36 %
MCH RBC QN AUTO: 29.4 PG (ref 26.5–33)
MCHC RBC AUTO-ENTMCNC: 32.8 G/DL (ref 31.5–36.5)
MCV RBC AUTO: 90 FL (ref 78–100)
MONOCYTES # BLD AUTO: 0.3 10E9/L (ref 0–1.3)
MONOCYTES NFR BLD AUTO: 7.5 %
NEUTROPHILS # BLD AUTO: 2.1 10E9/L (ref 1.6–8.3)
NEUTROPHILS NFR BLD AUTO: 53.9 %
PLATELET # BLD AUTO: 241 10E9/L (ref 150–450)
POTASSIUM SERPL-SCNC: 3.9 MMOL/L (ref 3.4–5.3)
PROT SERPL-MCNC: 7.9 G/DL (ref 6.8–8.8)
RBC # BLD AUTO: 4.89 10E12/L (ref 4.4–5.9)
SODIUM SERPL-SCNC: 140 MMOL/L (ref 133–144)
WBC # BLD AUTO: 3.9 10E9/L (ref 4–11)

## 2020-03-24 PROCEDURE — 85652 RBC SED RATE AUTOMATED: CPT | Performed by: INTERNAL MEDICINE

## 2020-03-24 PROCEDURE — 85025 COMPLETE CBC W/AUTO DIFF WBC: CPT | Performed by: INTERNAL MEDICINE

## 2020-03-24 PROCEDURE — 80053 COMPREHEN METABOLIC PANEL: CPT | Performed by: INTERNAL MEDICINE

## 2020-03-24 PROCEDURE — 86140 C-REACTIVE PROTEIN: CPT | Performed by: INTERNAL MEDICINE

## 2020-03-24 PROCEDURE — 36415 COLL VENOUS BLD VENIPUNCTURE: CPT | Performed by: INTERNAL MEDICINE

## 2020-08-25 ENCOUNTER — TELEPHONE (OUTPATIENT)
Dept: ORTHOPEDICS | Facility: CLINIC | Age: 55
End: 2020-08-25

## 2020-08-25 NOTE — TELEPHONE ENCOUNTER
[facility] Call Center    Phone Message    May a detailed message be left on voicemail: yes     Reason for Call: Symptoms or Concerns     If patient has red-flag symptoms, warm transfer to triage line    Current symptom or concern: Left knee pain in an unusual area and a bony protrusion on anterior side and has been increasing in intensity.     Symptoms have been present for:  3 week(s)    Has patient previously been seen for this? No    By: na    Date: na    Are there any new or worsening symptoms? No      Action Taken: Other: ortho    Travel Screening: Not Applicable

## 2020-08-25 NOTE — TELEPHONE ENCOUNTER
Addressed concerns in myChart. Pt scheduled to see Dr. Underwood.              -Chan, ATC- Orthopedics

## 2020-08-28 DIAGNOSIS — G89.29 CHRONIC PAIN OF LEFT KNEE: Primary | ICD-10-CM

## 2020-08-28 DIAGNOSIS — M25.562 CHRONIC PAIN OF LEFT KNEE: Primary | ICD-10-CM

## 2020-09-01 ENCOUNTER — TELEPHONE (OUTPATIENT)
Dept: PEDIATRICS | Facility: CLINIC | Age: 55
End: 2020-09-01

## 2020-09-02 ENCOUNTER — OFFICE VISIT (OUTPATIENT)
Dept: ORTHOPEDICS | Facility: CLINIC | Age: 55
End: 2020-09-02

## 2020-09-02 ENCOUNTER — ANCILLARY PROCEDURE (OUTPATIENT)
Dept: GENERAL RADIOLOGY | Facility: CLINIC | Age: 55
End: 2020-09-02
Attending: ORTHOPAEDIC SURGERY

## 2020-09-02 DIAGNOSIS — M25.562 CHRONIC PAIN OF LEFT KNEE: ICD-10-CM

## 2020-09-02 DIAGNOSIS — M25.562 CHRONIC PAIN OF LEFT KNEE: Primary | ICD-10-CM

## 2020-09-02 DIAGNOSIS — G89.29 CHRONIC PAIN OF LEFT KNEE: ICD-10-CM

## 2020-09-02 DIAGNOSIS — G89.29 CHRONIC PAIN OF LEFT KNEE: Primary | ICD-10-CM

## 2020-09-02 DIAGNOSIS — F32.9 MAJOR DEPRESSION, CHRONIC: Primary | ICD-10-CM

## 2020-09-02 NOTE — PROGRESS NOTES
Al returns to my clinic today for interval follow-up is a very pleasant 55-year-old male.  Well-known to me.  He is 15 years status post his ACL reconstruction I have diagnosed him with tricompartmental arthritis and he returns to my clinic today to have a discussion about his progress.  He is noted mass in the lateral aspect of his left knee.  Is not really painful to him.  He does think it is come up with the last couple months.  I did an injection on his knee in February of this year this was helpful to him however he is noted increasing knee pain particularly if his knee gets tweaked to the side he has pain.  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    Examination today shows a pleasant male no acute distress he is extremely fit.  Full range of motion of his knee.  Ligaments stable.  ACL stable.  Palpable 2 x 2 centimeter mass in the lateral aspect of his left knee.  Positive medial joint line tenderness.    Imaging done today shows progressive arthritis particular of his medial compartment but also of his lateral compartment of his left knee.  Prior ACL tunnels are appreciated.  No bony masses to account for the lateral soft tissue tumor.    Clinical assessment: 15 years status post ACL reconstruction with progressive tricompartmental arthritis of his knee.  New lateral leave a soft tissue mass.  I think this likely represents a para meniscal cyst however it is new and it seems to have gotten larger    Plan:  I am in a get an MRI of his knee.  We will plan to review the results together.  If this confirms that it is a para meniscal cyst we could consider ultrasound drainage versus continued observation.  I would also offer him a steroid injection for his knee.  I did discuss the thought of doing this today but I think be better to get the MRI first.  I will plan to see him back in clinic after the MRI is done.

## 2020-09-02 NOTE — LETTER
9/2/2020      RE: Lasha Oglesbyi  [address]  [place] [address]       Al returns to my clinic today for interval follow-up is a very pleasant 55-year-old male.  Well-known to me.  He is 15 years status post his ACL reconstruction I have diagnosed him with tricompartmental arthritis and he returns to my clinic today to have a discussion about his progress.  He is noted mass in the lateral aspect of his left knee.  Is not really painful to him.  He does think it is come up with the last couple months.  I did an injection on his knee in February of this year this was helpful to him however he is noted increasing knee pain particularly if his knee gets tweaked to the side he has pain.  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    Examination today shows a pleasant male no acute distress he is extremely fit.  Full range of motion of his knee.  Ligaments stable.  ACL stable.  Palpable 2 x 2 centimeter mass in the lateral aspect of his left knee.  Positive medial joint line tenderness.    Imaging done today shows progressive arthritis particular of his medial compartment but also of his lateral compartment of his left knee.  Prior ACL tunnels are appreciated.  No bony masses to account for the lateral soft tissue tumor.    Clinical assessment: 15 years status post ACL reconstruction with progressive tricompartmental arthritis of his knee.  New lateral leave a soft tissue mass.  I think this likely represents a para meniscal cyst however it is new and it seems to have gotten larger    Plan:  I am in a get an MRI of his knee.  We will plan to review the results together.  If this confirms that it is a para meniscal cyst we could consider ultrasound drainage versus continued observation.  I would also offer him a steroid injection for his knee.  I did discuss the thought of doing this today but I think be better to get the MRI first.  I will  plan to see him back in clinic after the MRI is done.    Bobby Underwood MD

## 2020-09-04 NOTE — TELEPHONE ENCOUNTER
FLUoxetine (PROZAC) 20 MG capsule       Historical entry  Last Office Visit : 2/6/20  Future Office visit:  None scheduled    Routing refill request to provider for review/approval because:  Medication is reported/historical  Pharmacy sent prescription without any previous sig  What dose is he taking?  Who previously prescribed?   Snap shot shows depression on problem list, need follow up?       normal

## 2020-09-08 ENCOUNTER — ANCILLARY PROCEDURE (OUTPATIENT)
Dept: MRI IMAGING | Facility: CLINIC | Age: 55
End: 2020-09-08
Attending: ORTHOPAEDIC SURGERY

## 2020-09-08 ENCOUNTER — TELEPHONE (OUTPATIENT)
Dept: PEDIATRICS | Facility: CLINIC | Age: 55
End: 2020-09-08

## 2020-09-08 DIAGNOSIS — G89.29 CHRONIC PAIN OF LEFT KNEE: ICD-10-CM

## 2020-09-08 DIAGNOSIS — M25.562 CHRONIC PAIN OF LEFT KNEE: ICD-10-CM

## 2020-09-08 NOTE — TELEPHONE ENCOUNTER
Attempt 1  LM for pt, notified rx sent to pharm. Instructed to sched f/u appt in clinic per dr. Wilks. Rashmi Enriquez LPN

## 2020-09-08 NOTE — TELEPHONE ENCOUNTER
----- Message from Pal Wilks MD sent at 9/8/2020  8:11 AM CDT -----  Please inform patient I reviewed fluoxetine for 90 days.  He needs to make an appointment and see me in the clinic at least by November 2020 before further refills can be authorized.

## 2020-09-09 ENCOUNTER — OFFICE VISIT (OUTPATIENT)
Dept: ORTHOPEDICS | Facility: CLINIC | Age: 55
End: 2020-09-09

## 2020-09-09 DIAGNOSIS — G89.29 CHRONIC PAIN OF LEFT KNEE: Primary | ICD-10-CM

## 2020-09-09 DIAGNOSIS — M25.562 CHRONIC PAIN OF LEFT KNEE: Primary | ICD-10-CM

## 2020-09-09 RX ADMIN — TRIAMCINOLONE ACETONIDE 40 MG: 40 INJECTION, SUSPENSION INTRA-ARTICULAR; INTRAMUSCULAR at 12:17

## 2020-09-09 RX ADMIN — LIDOCAINE HYDROCHLORIDE 8 ML: 5 INJECTION, SOLUTION INFILTRATION; INTRAVENOUS at 12:17

## 2020-09-09 NOTE — LETTER
9/9/2020      RE: Lasha Sims  [address]  [facility] [address]       Al is a 55-year-old gentleman well-known to clinic.  Was last seen 1 week ago.  He is here for follow-up and review of his MRI.  He is 15 years status post ACL reconstruction with known tricompartmental arthritis.  Has been managing symptomatically with intermittent injections.  He continues very active in [employer].  Was noted to have a palpable lateral knee mass for which an MRI was ordered.  Reports no change in his symptoms over the last week.  Is interested in an injection today    Examination today shows a fit male noted in no distress.  Full left knee range of motion with stable ligamentous exam.  Firm, palpable 2 x 2 centimeter mass in the lateral aspect of his knee around the fibular head.  Nontender.  Does have medial joint line tenderness.  CMS intact distally    MRI review shows fluid appears to be extending from the joint laterally into the area around biceps femoris and fibular head.  No discrete soft tissue mass noted.  Moderate knee effusion.  Postsurgical changes with intact ACL graft.  Degenerative meniscus tearing both medial lateral.  Advanced tricompartmental arthritis.    Assessment is a 55-year-old gentleman 15-year status post left knee ACL reconstruction with ongoing tricompartmental arthritis, symptomatic with a large left para meniscal cyst.    Plan for corticosteroid injection today, ongoing symptomatic management of symptoms.  No intervention for the cyst, if it becomes problematic can be seen in sports medicine for aspiration under ultrasound guidance.  Follow-up on an as-needed basis    Patient seen and discussed with Dr. Underwood, who agrees with the above plan        Warren Covarrubias MD  Orthopedic Surgery, PGY-5  11:57 AM  September 9, 2020        Patient seen and examined with the resident.     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  Reviewed the diagnosis potential treatment options at this time her plans will still be to maximize nonsurgical management to the best of her ability with time, relative rest, activity modification, strengthening program and consideration of intermittent injections.  I discussed with the patient my thoughts regarding this and we will plan to proceed with an injection today.    I do think the mass that he feels laterally represents a para meniscal cyst I do not think any intervention is warranted to this.    After written informed consent obtained and signed, after sufficient prepping and sterile technique, 40 mg of kenalog and , 8 cc of 1% lidocaine were injected without complication into the left knee. The patient tolerated the injection well and a sterile dressing was applied.       I agree with history, physical and imaging as well as the assessment and plan as detailed by Dr. Covarrubias.       Large Joint Injection/Arthocentesis: L knee joint    Date/Time: 9/9/2020 12:17 PM  Performed by: Bobby Underwood MD  Authorized by: Bobby Underwood MD     Needle Size:  22 G  Guidance: landmark guided    Approach:  Anterolateral  Location:  Knee      Medications:  8 mL lidocaine (PF) 0.5 %; 40 mg triamcinolone 40 MG/ML  Outcome:  Tolerated well, no immediate complications  Procedure discussed: discussed risks, benefits, and alternatives    Consent Given by:  Patient  Timeout: timeout called immediately prior to procedure    Prep: patient was prepped and draped in usual sterile fashion     Scribed by Jessica Watt ATC for Dr. Underwood on 9/9/20 at 12PM, based on the provider s statements to me.            Bobby Underwood MD

## 2020-09-09 NOTE — PROGRESS NOTES
Al is a 55-year-old gentleman well-known to clinic.  Was last seen 1 week ago.  He is here for follow-up and review of his MRI.  He is 15 years status post ACL reconstruction with known tricompartmental arthritis.  Has been managing symptomatically with intermittent injections.  He continues very active in [employer].  Was noted to have a palpable lateral knee mass for which an MRI was ordered.  Reports no change in his symptoms over the last week.  Is interested in an injection today    Examination today shows a fit male noted in no distress.  Full left knee range of motion with stable ligamentous exam.  Firm, palpable 2 x 2 centimeter mass in the lateral aspect of his knee around the fibular head.  Nontender.  Does have medial joint line tenderness.  CMS intact distally    MRI review shows fluid appears to be extending from the joint laterally into the area around biceps femoris and fibular head.  No discrete soft tissue mass noted.  Moderate knee effusion.  Postsurgical changes with intact ACL graft.  Degenerative meniscus tearing both medial lateral.  Advanced tricompartmental arthritis.    Assessment is a 55-year-old gentleman 15-year status post left knee ACL reconstruction with ongoing tricompartmental arthritis, symptomatic with a large left para meniscal cyst.    Plan for corticosteroid injection today, ongoing symptomatic management of symptoms.  No intervention for the cyst, if it becomes problematic can be seen in sports medicine for aspiration under ultrasound guidance.  Follow-up on an as-needed basis    Patient seen and discussed with Dr. Underwood, who agrees with the above plan        Warren Covarrubias MD  Orthopedic Surgery, PGY-5  11:57 AM  September 9, 2020

## 2020-09-09 NOTE — PROGRESS NOTES
Patient seen and examined with the resident.     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  Reviewed the diagnosis potential treatment options at this time her plans will still be to maximize nonsurgical management to the best of her ability with time, relative rest, activity modification, strengthening program and consideration of intermittent injections.  I discussed with the patient my thoughts regarding this and we will plan to proceed with an injection today.    I do think the mass that he feels laterally represents a para meniscal cyst I do not think any intervention is warranted to this.    After written informed consent obtained and signed, after sufficient prepping and sterile technique, 40 mg of kenalog and , 8 cc of 1% lidocaine were injected without complication into the left knee. The patient tolerated the injection well and a sterile dressing was applied.       I agree with history, physical and imaging as well as the assessment and plan as detailed by Dr. Covarrubias.       Large Joint Injection/Arthocentesis: L knee joint    Date/Time: 9/9/2020 12:17 PM  Performed by: Bobby Underwood MD  Authorized by: Bobby Underwood MD     Needle Size:  22 G  Guidance: landmark guided    Approach:  Anterolateral  Location:  Knee      Medications:  8 mL lidocaine (PF) 0.5 %; 40 mg triamcinolone 40 MG/ML  Outcome:  Tolerated well, no immediate complications  Procedure discussed: discussed risks, benefits, and alternatives    Consent Given by:  Patient  Timeout: timeout called immediately prior to procedure    Prep: patient was prepped and draped in usual sterile fashion     Scribed by Jessica Watt ATC for Dr. Underwood on 9/9/20 at 12PM, based on the provider s statements to me.

## 2020-09-09 NOTE — NURSING NOTE
[address] [address]  Dept: [phone]  ______________________________________________________________________________    Patient: Lasha Sims   : 1965   MRN: [id number]   2020    INVASIVE PROCEDURE SAFETY CHECKLIST    Date: 20    Procedure: left knee injection with Kenalog  Patient Name: Lasha Sims  MRN: [id number]  YOB: 1965    Action: Complete sections as appropriate. Any discrepancy results in a HARD COPY until resolved.     PRE PROCEDURE:  Patient ID verified with 2 identifiers (name and  or MRN): Yes  Procedure and site verified with patient/designee (when able): Yes  Accurate consent documentation in medical record: Yes  H&P (or appropriate assessment) documented in medical record: Yes  H&P must be up to 20 days prior to procedure and updates within 24 hours of procedure as applicable: NA  Relevant diagnostic and radiology test results appropriately labeled and displayed as applicable: Yes  Procedure site(s) marked with provider initials: NA    TIMEOUT:  Time-Out performed immediately prior to starting procedure, including verbal and active participation of all team members addressing the following:Yes  * Correct patient identify  * Confirmed that the correct side and site are marked  * An accurate procedure consent form  * Agreement on the procedure to be done  * Correct patient position  * Relevant images and results are properly labeled and appropriately displayed  * The need to administer antibiotics or fluids for irrigation purposes during the procedure as applicable   * Safety precautions based on patient history or medication use    DURING PROCEDURE: Verification of correct person, site, and procedures any time the responsibility for care of the patient is transferred to another member of the care team.       Prior to injection, verified patient identity using patient's name and date of  birth.  Due to injection administration, patient instructed to remain in clinic for 15 minutes  afterwards, and to report any adverse reaction to me immediately.    Joint injection was performed.      Drug Amount Wasted:  None.  Vial/Syringe: Single dose vial  Expiration Date:  4/22      Jessica Watt [facility]  September 9, 2020

## 2020-09-14 RX ORDER — LIDOCAINE HYDROCHLORIDE 5 MG/ML
8 INJECTION, SOLUTION INFILTRATION; INTRAVENOUS
Status: DISCONTINUED | OUTPATIENT
Start: 2020-09-09 | End: 2021-03-04

## 2020-09-14 RX ORDER — TRIAMCINOLONE ACETONIDE 40 MG/ML
40 INJECTION, SUSPENSION INTRA-ARTICULAR; INTRAMUSCULAR
Status: DISCONTINUED | OUTPATIENT
Start: 2020-09-09 | End: 2021-03-04

## 2020-09-15 NOTE — TELEPHONE ENCOUNTER
Scheduling staff left voicemail to schedule annual visit in October on 9/1.    Recall letter mailed.    Elissa Larios  Primary Care   [facility] [place]

## 2020-10-15 ENCOUNTER — DOCUMENTATION ONLY (OUTPATIENT)
Dept: LAB | Facility: CLINIC | Age: 55
End: 2020-10-15

## 2020-10-15 DIAGNOSIS — E78.00 HYPERCHOLESTEREMIA: Primary | ICD-10-CM

## 2020-10-15 DIAGNOSIS — F32.9 MAJOR DEPRESSION, CHRONIC: ICD-10-CM

## 2020-10-15 DIAGNOSIS — D70.8 OTHER NEUTROPENIA (H): ICD-10-CM

## 2020-10-15 DIAGNOSIS — Z12.5 SCREENING FOR PROSTATE CANCER: ICD-10-CM

## 2020-10-20 ENCOUNTER — OFFICE VISIT (OUTPATIENT)
Dept: PEDIATRICS | Facility: CLINIC | Age: 55
End: 2020-10-20
Payer: COMMERCIAL

## 2020-10-20 VITALS
SYSTOLIC BLOOD PRESSURE: 117 MMHG | TEMPERATURE: 96.6 F | DIASTOLIC BLOOD PRESSURE: 67 MMHG | BODY MASS INDEX: 24.14 KG/M2 | OXYGEN SATURATION: 99 % | WEIGHT: 163 LBS | HEART RATE: 55 BPM | RESPIRATION RATE: 14 BRPM | HEIGHT: 69 IN

## 2020-10-20 DIAGNOSIS — Z00.00 ROUTINE GENERAL MEDICAL EXAMINATION AT A HEALTH CARE FACILITY: Primary | ICD-10-CM

## 2020-10-20 DIAGNOSIS — E78.00 HYPERCHOLESTEREMIA: ICD-10-CM

## 2020-10-20 DIAGNOSIS — Z12.5 SCREENING FOR PROSTATE CANCER: ICD-10-CM

## 2020-10-20 DIAGNOSIS — R73.01 IFG (IMPAIRED FASTING GLUCOSE): ICD-10-CM

## 2020-10-20 DIAGNOSIS — Z23 NEED FOR INFLUENZA VACCINATION: ICD-10-CM

## 2020-10-20 DIAGNOSIS — D70.8 OTHER NEUTROPENIA (H): ICD-10-CM

## 2020-10-20 DIAGNOSIS — F32.9 MAJOR DEPRESSION, CHRONIC: ICD-10-CM

## 2020-10-20 DIAGNOSIS — F41.1 GENERALIZED ANXIETY DISORDER: ICD-10-CM

## 2020-10-20 LAB
ALBUMIN SERPL-MCNC: 4.1 G/DL (ref 3.4–5)
ALP SERPL-CCNC: 58 U/L (ref 40–150)
ALT SERPL W P-5'-P-CCNC: 34 U/L (ref 0–70)
ANION GAP SERPL CALCULATED.3IONS-SCNC: 3 MMOL/L (ref 3–14)
AST SERPL W P-5'-P-CCNC: 4 U/L (ref 0–45)
BASOPHILS # BLD AUTO: 0 10E9/L (ref 0–0.2)
BASOPHILS NFR BLD AUTO: 1.1 %
BILIRUB SERPL-MCNC: 0.8 MG/DL (ref 0.2–1.3)
BUN SERPL-MCNC: 21 MG/DL (ref 7–30)
CALCIUM SERPL-MCNC: 8.9 MG/DL (ref 8.5–10.1)
CHLORIDE SERPL-SCNC: 106 MMOL/L (ref 94–109)
CHOLEST SERPL-MCNC: 212 MG/DL
CO2 SERPL-SCNC: 32 MMOL/L (ref 20–32)
CREAT SERPL-MCNC: 0.9 MG/DL (ref 0.66–1.25)
DIFFERENTIAL METHOD BLD: ABNORMAL
EOSINOPHIL # BLD AUTO: 0.1 10E9/L (ref 0–0.7)
EOSINOPHIL NFR BLD AUTO: 1.7 %
ERYTHROCYTE [DISTWIDTH] IN BLOOD BY AUTOMATED COUNT: 12.6 % (ref 10–15)
GFR SERPL CREATININE-BSD FRML MDRD: >90 ML/MIN/{1.73_M2}
GLUCOSE SERPL-MCNC: 117 MG/DL (ref 70–99)
HBA1C MFR BLD: 5.6 % (ref 0–5.6)
HCT VFR BLD AUTO: 42.3 % (ref 40–53)
HDLC SERPL-MCNC: 67 MG/DL
HGB BLD-MCNC: 14.1 G/DL (ref 13.3–17.7)
IMM GRANULOCYTES # BLD: 0 10E9/L (ref 0–0.4)
IMM GRANULOCYTES NFR BLD: 0.6 %
LDLC SERPL CALC-MCNC: 124 MG/DL
LYMPHOCYTES # BLD AUTO: 1.4 10E9/L (ref 0.8–5.3)
LYMPHOCYTES NFR BLD AUTO: 38.8 %
MCH RBC QN AUTO: 29.6 PG (ref 26.5–33)
MCHC RBC AUTO-ENTMCNC: 33.3 G/DL (ref 31.5–36.5)
MCV RBC AUTO: 89 FL (ref 78–100)
MONOCYTES # BLD AUTO: 0.3 10E9/L (ref 0–1.3)
MONOCYTES NFR BLD AUTO: 7.8 %
NEUTROPHILS # BLD AUTO: 1.7 10E9/L (ref 1.6–8.3)
NEUTROPHILS NFR BLD AUTO: 50 %
NONHDLC SERPL-MCNC: 145 MG/DL
PLATELET # BLD AUTO: 232 10E9/L (ref 150–450)
POTASSIUM SERPL-SCNC: 3.9 MMOL/L (ref 3.4–5.3)
PROT SERPL-MCNC: 7.4 G/DL (ref 6.8–8.8)
PSA SERPL-ACNC: 1.04 UG/L (ref 0–4)
RBC # BLD AUTO: 4.77 10E12/L (ref 4.4–5.9)
SODIUM SERPL-SCNC: 141 MMOL/L (ref 133–144)
TRIGL SERPL-MCNC: 104 MG/DL
WBC # BLD AUTO: 3.5 10E9/L (ref 4–11)

## 2020-10-20 PROCEDURE — G0103 PSA SCREENING: HCPCS | Performed by: INTERNAL MEDICINE

## 2020-10-20 PROCEDURE — 90471 IMMUNIZATION ADMIN: CPT | Performed by: INTERNAL MEDICINE

## 2020-10-20 PROCEDURE — 80061 LIPID PANEL: CPT | Performed by: INTERNAL MEDICINE

## 2020-10-20 PROCEDURE — 90682 RIV4 VACC RECOMBINANT DNA IM: CPT | Performed by: INTERNAL MEDICINE

## 2020-10-20 PROCEDURE — 99396 PREV VISIT EST AGE 40-64: CPT | Mod: 25 | Performed by: INTERNAL MEDICINE

## 2020-10-20 PROCEDURE — 99213 OFFICE O/P EST LOW 20 MIN: CPT | Mod: 25 | Performed by: INTERNAL MEDICINE

## 2020-10-20 PROCEDURE — 83036 HEMOGLOBIN GLYCOSYLATED A1C: CPT | Performed by: INTERNAL MEDICINE

## 2020-10-20 PROCEDURE — 80053 COMPREHEN METABOLIC PANEL: CPT | Performed by: INTERNAL MEDICINE

## 2020-10-20 PROCEDURE — 36415 COLL VENOUS BLD VENIPUNCTURE: CPT | Performed by: INTERNAL MEDICINE

## 2020-10-20 PROCEDURE — 85025 COMPLETE CBC W/AUTO DIFF WBC: CPT | Performed by: INTERNAL MEDICINE

## 2020-10-20 RX ORDER — SIMVASTATIN 10 MG
10 TABLET ORAL AT BEDTIME
Qty: 90 TABLET | Refills: 3 | Status: SHIPPED | OUTPATIENT
Start: 2020-10-20 | End: 2020-12-09

## 2020-10-20 ASSESSMENT — ANXIETY QUESTIONNAIRES
GAD7 TOTAL SCORE: 3
5. BEING SO RESTLESS THAT IT IS HARD TO SIT STILL: NOT AT ALL
IF YOU CHECKED OFF ANY PROBLEMS ON THIS QUESTIONNAIRE, HOW DIFFICULT HAVE THESE PROBLEMS MADE IT FOR YOU TO DO YOUR WORK, TAKE CARE OF THINGS AT HOME, OR GET ALONG WITH OTHER PEOPLE: NOT DIFFICULT AT ALL
7. FEELING AFRAID AS IF SOMETHING AWFUL MIGHT HAPPEN: NOT AT ALL
3. WORRYING TOO MUCH ABOUT DIFFERENT THINGS: NOT AT ALL
2. NOT BEING ABLE TO STOP OR CONTROL WORRYING: SEVERAL DAYS
6. BECOMING EASILY ANNOYED OR IRRITABLE: SEVERAL DAYS
1. FEELING NERVOUS, ANXIOUS, OR ON EDGE: SEVERAL DAYS

## 2020-10-20 ASSESSMENT — PATIENT HEALTH QUESTIONNAIRE - PHQ9
SUM OF ALL RESPONSES TO PHQ QUESTIONS 1-9: 0
5. POOR APPETITE OR OVEREATING: NOT AT ALL

## 2020-10-20 ASSESSMENT — MIFFLIN-ST. JEOR: SCORE: 1560.77

## 2020-10-20 NOTE — PROGRESS NOTES
SUBJECTIVE:   CC: Lasha Sims is an 55 year old male who presents for preventative health visit.     55-year-old man comes for annual preventive physical examination.  He has history of depression anxiety which is well controlled with fluoxetine.  He also has hyperlipidemia for which he takes simvastatin.  He has been doing well and offers no concerns.  For the past couple of years we have been monitoring his white count which has been mildly low.  He exercises regularly does not smoke or abuse alcohol.  He denies chest pain, shortness of breath, dizziness or lightheadedness.  No gastrointestinal symptoms.  No urogenital symptoms.      Patient has been advised of split billing requirements and indicates understanding: Yes     Healthy Habits:     Getting at least 3 servings of Calcium per day:  Yes    Bi-annual eye exam:  Yes    Dental care twice a year:  Yes    Sleep apnea or symptoms of sleep apnea:  Excessive snoring    Diet:  Regular (no restrictions)    Frequency of exercise:  4-5 days/week    Duration of exercise:  45-60 minutes    Taking medications regularly:  Yes    Medication side effects:  None    PHQ-2 Total Score: 0    Additional concerns today:  No          Today's PHQ-2 Score:   PHQ-2 ( 1999 Pfizer) 10/19/2020   Q1: Little interest or pleasure in doing things 0   Q2: Feeling down, depressed or hopeless 0   PHQ-2 Score 0   Q1: Little interest or pleasure in doing things Not at all   Q2: Feeling down, depressed or hopeless Not at all   PHQ-2 Score 0       Abuse: Current or Past(Physical, Sexual or Emotional)- No  Do you feel safe in your environment? Yes    Have you ever done Advance Care Planning? (For example, a Health Directive, POLST, or a discussion with a medical provider or your loved ones about your wishes): Yes, patient states has an Advance Care Planning document and will bring a copy to the clinic.    Social History     Tobacco Use     Smoking status: Never Smoker     Smokeless tobacco:  Never Used     Tobacco comment: na   Substance Use Topics     Alcohol use: Yes     Frequency: Monthly or less     If you drink alcohol do you typically have >3 drinks per day or >7 drinks per week? No    No flowsheet data found.    Last PSA:   PSA   Date Value Ref Range Status   10/20/2020 1.04 0 - 4 ug/L Final     Comment:     Assay Method:  Chemiluminescence using Siemens Vista analyzer       Reviewed orders with patient. Reviewed health maintenance and updated orders accordingly - Yes  BP Readings from Last 3 Encounters:   10/20/20 117/67   02/06/20 118/70   11/13/19 104/62    Wt Readings from Last 3 Encounters:   10/20/20 73.9 kg (163 lb)   02/06/20 73.5 kg (162 lb)   11/13/19 73.9 kg (163 lb)                  Patient Active Problem List   Diagnosis     Osteoarthritis of left knee     Major depression, chronic     Generalized anxiety disorder     Hypercholesteremia     Other neutropenia (H)     IFG (impaired fasting glucose)     Past Surgical History:   Procedure Laterality Date     APPENDECTOMY OPEN N/A      COLONOSCOPY  2017     HERNIA REPAIR Left      KNEE SURGERY Left 2003    ACL repair and partioal lateral manisectomy     ORTHOPEDIC SURGERY  2003    ACL repair left     THORACOSCOPY Right 10/19/2010    for pneumothorax. upper lobe wedge resection       Social History     Tobacco Use     Smoking status: Never Smoker     Smokeless tobacco: Never Used     Tobacco comment: na   Substance Use Topics     Alcohol use: Yes     Frequency: Monthly or less     Family History   Problem Relation Age of Onset     Hyperlipidemia Mother      Multiple myeloma Father 60     Other Cancer Father         multiple myloma     Prostate Cancer Brother      Brain Cancer Paternal Grandfather 60     Depression Daughter          Current Outpatient Medications   Medication Sig Dispense Refill     FLUoxetine (PROZAC) 20 MG capsule Take 1 capsule (20 mg) by mouth daily 90 capsule 0     simvastatin (ZOCOR) 10 MG tablet Take 1 tablet (10 mg)  by mouth At Bedtime 90 tablet 0     No Known Allergies  Recent Labs   Lab Test 10/20/20  0904 03/24/20  0914 01/22/20  0713 10/28/19  0807   A1C 5.6  --   --   --    *  --  121* 209*   HDL 67  --  49 56   TRIG 104  --  107 147   ALT 34 57  --  35   CR 0.90 1.08  --  0.99   GFRESTIMATED >90 77  --  86   GFRESTBLACK >90 89  --  >90   POTASSIUM 3.9 3.9  --  4.2   TSH  --   --   --  0.81        Reviewed and updated as needed this visit by clinical staff  Tobacco     Med Hx  Surg Hx  Fam Hx  Soc Hx        Reviewed and updated as needed this visit by Provider                Past Medical History:   Diagnosis Date     Depressive disorder 2001     Generalized anxiety disorder 1990     Major depression, chronic      Mild hyperlipidemia      Osteoarthritis of left knee      Other neutropenia (H) 2/6/2020     Spontaneous pneumothorax 10/2010    Right side      Past Surgical History:   Procedure Laterality Date     APPENDECTOMY OPEN N/A      COLONOSCOPY  2017     HERNIA REPAIR Left      KNEE SURGERY Left 2003    ACL repair and partioal lateral manisectomy     ORTHOPEDIC SURGERY  2003    ACL repair left     THORACOSCOPY Right 10/19/2010    for pneumothorax. upper lobe wedge resection       Review of Systems  CONSTITUTIONAL: NEGATIVE for fever, chills, change in weight  INTEGUMENTARY/SKIN: NEGATIVE for worrisome rashes, moles or lesions  EYES: NEGATIVE for vision changes or irritation  ENT: NEGATIVE for ear, mouth and throat problems  RESP: NEGATIVE for significant cough or SOB  CV: NEGATIVE for chest pain, palpitations or peripheral edema  GI: NEGATIVE for nausea, abdominal pain, heartburn, or change in bowel habits   male: negative for dysuria, hematuria, decreased urinary stream, erectile dysfunction, urethral discharge  MUSCULOSKELETAL: NEGATIVE for significant arthralgias or myalgia  NEURO: NEGATIVE for weakness, dizziness or paresthesias  ENDOCRINE: NEGATIVE for temperature intolerance, skin/hair  changes  HEME/ALLERGY/IMMUNE: NEGATIVE for bleeding problems  PSYCHIATRIC: NEGATIVE for changes in mood or affect    OBJECTIVE:   There were no vitals taken for this visit.    Physical Exam  GENERAL: healthy, alert and no distress  EYES: Eyes grossly normal to inspection, PERRL and conjunctivae and sclerae normal  HENT: ear canals and TM's normal, nose and mouth without ulcers or lesions  NECK: no adenopathy, no asymmetry, masses, or scars and thyroid normal to palpation  RESP: lungs clear to auscultation - no rales, rhonchi or wheezes  CV: regular rate and rhythm, normal S1 S2, no S3 or S4, no murmur, click or rub, no peripheral edema and peripheral pulses strong  ABDOMEN: soft, nontender, no hepatosplenomegaly, no masses and bowel sounds normal   (male): normal male genitalia without lesions or urethral discharge, no hernia  RECTAL: normal sphincter tone, no rectal masses, prostate normal size, smooth, nontender without nodules or masses  MS: no gross musculoskeletal defects noted, no edema  SKIN: no suspicious lesions or rashes  NEURO: Normal strength and tone, mentation intact and speech normal  PSYCH: mentation appears normal, affect normal/bright    Diagnostic Test Results:  Labs reviewed in Epic  Results for orders placed or performed in visit on 10/20/20 (from the past 24 hour(s))   Hemoglobin A1c   Result Value Ref Range    Hemoglobin A1C 5.6 0 - 5.6 %   White count today 3.5 with differential count unchanged from before.  Hemoglobin 14.1 and platelet count 232,000.  CMP normal.  Fasting blood sugar 117 with A1c of 5.6  Total cholesterol 212, HDL 67  triglyceride 104 patient currently owns simvastatin.  Screening PSA normal at 1.04.    ASSESSMENT/PLAN:     1.  Routine general medical exam-preventive physical examination performed and is good.  2.  Chronic neutropenia.  Reviewed the lab with patient.  His WBC count was borderline low at 3.5 with differential count somewhat unremarkable and unchanged  "from before.  We discussed watching it closely repeating it a year or referral to hematologist for consultation.  Patient decided to wait.  3.  Hypercholesterolemia been treated with simvastatin 10 mg a day with lipid profile listed above which is good.  Continue same medication  4.  Generalized anxiety disorder well-controlled with fluoxetine.  JESSIKA-7 score 3 today.  5.  Major depression chronic very well controlled with fluoxetine.  PHQ-9 score was 0 today  6.  Impaired fasting glucose with blood sugar 117 but A1c 5.6  7.  Influenza vaccine provided.      Patient has been advised of split billing requirements and indicates understanding: Yes  COUNSELING:   Reviewed preventive health counseling, as reflected in patient instructions       Regular exercise       Healthy diet/nutrition       Vision screening    Estimated body mass index is 24.1 kg/m  as calculated from the following:    Height as of 2/6/20: 1.746 m (5' 8.75\").    Weight as of 2/6/20: 73.5 kg (162 lb).         He reports that he has never smoked. He has never used smokeless tobacco.      Counseling Resources:  ATP IV Guidelines  Pooled Cohorts Equation Calculator  FRAX Risk Assessment  ICSI Preventive Guidelines  Dietary Guidelines for Americans, 2010  [employer] Share Your Passion!'s MyPlate  ASA Prophylaxis  Lung CA Screening    Pal Wilks MD  [facility]  "

## 2020-10-21 ASSESSMENT — ANXIETY QUESTIONNAIRES: GAD7 TOTAL SCORE: 3

## 2020-11-15 ASSESSMENT — ENCOUNTER SYMPTOMS
JOINT SWELLING: 1
MYALGIAS: 0
ARTHRALGIAS: 1
MUSCLE WEAKNESS: 0
BACK PAIN: 0
NECK PAIN: 0
STIFFNESS: 1
MUSCLE CRAMPS: 0

## 2020-11-16 ENCOUNTER — OFFICE VISIT (OUTPATIENT)
Dept: ORTHOPEDICS | Facility: CLINIC | Age: 55
End: 2020-11-16
Payer: COMMERCIAL

## 2020-11-16 VITALS — RESPIRATION RATE: 16 BRPM | BODY MASS INDEX: 23.99 KG/M2 | HEIGHT: 69 IN | WEIGHT: 162 LBS

## 2020-11-16 DIAGNOSIS — M17.12 PRIMARY OSTEOARTHRITIS OF LEFT KNEE: Primary | ICD-10-CM

## 2020-11-16 PROCEDURE — 99213 OFFICE O/P EST LOW 20 MIN: CPT | Performed by: ORTHOPAEDIC SURGERY

## 2020-11-16 ASSESSMENT — MIFFLIN-ST. JEOR: SCORE: 1556.24

## 2020-11-16 NOTE — PROGRESS NOTES
Lasha Sims Returns to my clinic today for interval follow-up is a very pleasant 55-year-old male.  Well-known to me.  I saw him earlier this year did a corticosteroid injection.  He has a remote history of an ACL reconstruction and and now has gone on to progressive medial compartment and patellofemoral arthritis.  The lateral compartment is also not pristine.  I have previously told him that I did not think there was a surgery short of knee replacement to provide lasting benefit.  We have been trying to avoid this.  The patient admits today that the last injection that he got did not provide lasting benefit.  He is currently struggling.  It bothers him at night and gets done exercising.    Examination is largely unchanged.  Knee is stable motion is preserved.  Palpable lateral compartment cyst is noted.    Imaging shows medial compartment arthritis which is very high-grade.  High-grade patellofemoral arthritis is also present.    Clinical assessment posttraumatic osteoarthritis left knee multiple years following ACL reconstruction    Plan: Long discussion with the patient regarding his left knee.  Reviewed the diagnosis potential treatment options.  His last corticosteroid injection did not provide lasting benefit.  The patient is still untreated interested in proceeding with knee replacement surgery at this time however I continue to maximize nonsurgical management.  To this and I am going to pursue prior authorization for viscosupplementation injection of this that is something that we could try.  He is voiced understanding this.  I also discussed with him a  brace as well as oral diclofenac.  He still has some it is good to try to take this.  Follow-up when viscosupplementation injection is approved.

## 2020-11-19 DIAGNOSIS — F32.9 MAJOR DEPRESSION, CHRONIC: ICD-10-CM

## 2020-11-20 DIAGNOSIS — M17.12 PRIMARY OSTEOARTHRITIS OF LEFT KNEE: Primary | ICD-10-CM

## 2020-11-20 NOTE — TELEPHONE ENCOUNTER
----- Message from Lo Gonzalez sent at 11/17/2020  2:49 PM CST -----  Regarding: RE: PA for Synvisc one  Hi,    [employer] [place]'s preferred products are Durolane, Euflexxa, Gelsyn-3 or Supartz FX. Can you tell me if one of these alternatives would be an option & if so, can you have an order placed & let me know when that is complete?    Thank you,    Lo  ----- Message -----  From: Stan Toribio, ATC  Sent: 11/16/2020   2:55 PM CST  To: Jessica Watt, ATC, #  Subject: PA for Synvisc one                               Hi,     put in for a Synvisc one PA. Can you check to make sure this is covered and after we get approval can you make sure to reply to Jessica who is CC'd so we can follow up and schedule patient.    Thank you

## 2020-11-23 NOTE — TELEPHONE ENCOUNTER
FLUOXETINE HCL CAPS 20MG  Last Written Prescription Date:  10/20/2020  Last Fill Quantity: 90,   # refills: 3  Last Office Visit : 10/20/2020  Future Office visit:  None  Resent order to updated alternative pharmacy for Pt care.       Keyana Spencer RN  Central Triage Red Flags/Med Refills

## 2020-11-24 NOTE — TELEPHONE ENCOUNTER
Left voicemail for patient, informing him that we received PA approval for the gel injections that were previously discussed with Dr. Underwood. Callback number was left to schedule an appointment for this injection.

## 2020-11-30 ENCOUNTER — OFFICE VISIT (OUTPATIENT)
Dept: ORTHOPEDICS | Facility: CLINIC | Age: 55
End: 2020-11-30
Payer: COMMERCIAL

## 2020-11-30 VITALS — HEIGHT: 69 IN | WEIGHT: 162 LBS | BODY MASS INDEX: 23.99 KG/M2

## 2020-11-30 DIAGNOSIS — G89.29 CHRONIC PAIN OF LEFT KNEE: Primary | ICD-10-CM

## 2020-11-30 DIAGNOSIS — M25.562 CHRONIC PAIN OF LEFT KNEE: Primary | ICD-10-CM

## 2020-11-30 PROCEDURE — 20610 DRAIN/INJ JOINT/BURSA W/O US: CPT | Mod: LT | Performed by: ORTHOPAEDIC SURGERY

## 2020-11-30 ASSESSMENT — MIFFLIN-ST. JEOR: SCORE: 1556.24

## 2020-11-30 NOTE — LETTER
11/30/2020         RE: Lasha Sims  [address]  [facility] [address]        Dear Colleague,    Thank you for referring your patient, Lasha Sims, to the [facility] [place]. Please see a copy of my visit note below.    Large Joint Injection/Arthocentesis: L knee joint    Date/Time: 11/30/2020 11:45 AM  Performed by: Bobby Underwood MD  Authorized by: Bobby Underwood MD     Indications:  Pain  Needle Size:  22 G  Guidance: landmark guided    Approach:  Lateral  Location:  Knee      Medications:  25 mg sodium hyaluronate 25 MG/2.5ML  Outcome:  Tolerated well, no immediate complications  Procedure discussed: discussed risks, benefits, and alternatives    Consent Given by:  Patient  Timeout: timeout called immediately prior to procedure    Prep: patient was prepped and draped in usual sterile fashion     Scribed by Rashmi Quinones MS, LAT, ATC for Dr. Underwood on 11/30/2020 at 11:46 AM, based on the provider's statements to me.          Al is a very pleasant 55-year-old male.  Comes to my clinic today to complete his Supartz injection.  I saw him a couple weeks ago where we indicated him for this injection we get it approved through his insurance and he comes to clinic today for his first injection.    Examination shows a pleasant male no acute distress he is articulate and interactive.  Visual inspection shows no redness about his knee.  Ligament stable.  Neurovascular intact    Clinical assessment: Osteoarthritis left knee    Plan: We will plan to complete the viscosupplementation injection today.    After informed consent was obtained under sterile technique 1 ampoule of Supartz was inject without complication.  Patient tolerated injection well.  Sterile dressings were applied.  He will return to clinic next week for repeat injection.      Again, thank you for allowing me to participate in the care of your patient.        Sincerely,        Bobby  Dany Underwood MD

## 2020-11-30 NOTE — PROGRESS NOTES
Al is a very pleasant 55-year-old male.  Comes to my clinic today to complete his Supartz injection.  I saw him a couple weeks ago where we indicated him for this injection we get it approved through his insurance and he comes to clinic today for his first injection.    Examination shows a pleasant male no acute distress he is articulate and interactive.  Visual inspection shows no redness about his knee.  Ligament stable.  Neurovascular intact    Clinical assessment: Osteoarthritis left knee    Plan: We will plan to complete the viscosupplementation injection today.    After informed consent was obtained under sterile technique 1 ampoule of Supartz was inject without complication.  Patient tolerated injection well.  Sterile dressings were applied.  He will return to clinic next week for repeat injection.

## 2020-11-30 NOTE — PROGRESS NOTES
Large Joint Injection/Arthocentesis: L knee joint    Date/Time: 11/30/2020 11:45 AM  Performed by: Bobby Underwood MD  Authorized by: Bobby Underwood MD     Indications:  Pain  Needle Size:  22 G  Guidance: landmark guided    Approach:  Lateral  Location:  Knee      Medications:  25 mg sodium hyaluronate 25 MG/2.5ML  Outcome:  Tolerated well, no immediate complications  Procedure discussed: discussed risks, benefits, and alternatives    Consent Given by:  Patient  Timeout: timeout called immediately prior to procedure    Prep: patient was prepped and draped in usual sterile fashion     Scribed by Rashmi Quinones, MS, LAT, ATC for Dr. Underwood on 11/30/2020 at 11:46 AM, based on the provider's statements to me.

## 2020-11-30 NOTE — NURSING NOTE
[address] [address]  Dept: [phone]  ______________________________________________________________________________    Patient: Lasha Sims   : 1965   MRN: [id number]   2020    INVASIVE PROCEDURE SAFETY CHECKLIST    Date: 2020   Procedure: Left knee Supartz injection 1/3  Patient Name: Lasha Sims  MRN: [id number]  YOB: 1965    Action: Complete sections as appropriate. Any discrepancy results in a HARD COPY until resolved.     PRE PROCEDURE:  Patient ID verified with 2 identifiers (name and  or MRN): Yes  Procedure and site verified with patient/designee (when able): Yes  Accurate consent documentation in medical record: Yes  H&P (or appropriate assessment) documented in medical record: Yes  H&P must be up to 20 days prior to procedure and updates within 24 hours of procedure as applicable: NA  Relevant diagnostic and radiology test results appropriately labeled and displayed as applicable: Yes  Procedure site(s) marked with provider initials: NA    TIMEOUT:  Time-Out performed immediately prior to starting procedure, including verbal and active participation of all team members addressing the following:Yes  * Correct patient identify  * Confirmed that the correct side and site are marked  * An accurate procedure consent form  * Agreement on the procedure to be done  * Correct patient position  * Relevant images and results are properly labeled and appropriately displayed  * The need to administer antibiotics or fluids for irrigation purposes during the procedure as applicable   * Safety precautions based on patient history or medication use    DURING PROCEDURE: Verification of correct person, site, and procedures any time the responsibility for care of the patient is transferred to another member of the care team.       Prior to injection, verified patient identity using patient's name and date of  birth.  Due to injection administration, patient instructed to remain in clinic for 15 minutes  afterwards, and to report any adverse reaction to me immediately.    Joint injection was performed.      Drug Amount Wasted:  None.  Vial/Syringe: Syringe  Expiration Date:  2022-04-30      Rashmi Quinones, [facility]  November 30, 2020

## 2020-12-07 ENCOUNTER — OFFICE VISIT (OUTPATIENT)
Dept: ORTHOPEDICS | Facility: CLINIC | Age: 55
End: 2020-12-07
Payer: COMMERCIAL

## 2020-12-07 VITALS — WEIGHT: 162 LBS | HEIGHT: 69 IN | BODY MASS INDEX: 23.99 KG/M2

## 2020-12-07 DIAGNOSIS — G89.29 CHRONIC PAIN OF LEFT KNEE: Primary | ICD-10-CM

## 2020-12-07 DIAGNOSIS — M25.562 CHRONIC PAIN OF LEFT KNEE: Primary | ICD-10-CM

## 2020-12-07 PROCEDURE — 20610 DRAIN/INJ JOINT/BURSA W/O US: CPT | Mod: LT | Performed by: ORTHOPAEDIC SURGERY

## 2020-12-07 PROCEDURE — 99207 PR DROP WITH A PROCEDURE: CPT | Performed by: ORTHOPAEDIC SURGERY

## 2020-12-07 RX ORDER — DICLOFENAC SODIUM 75 MG/1
75 TABLET, DELAYED RELEASE ORAL 2 TIMES DAILY
Qty: 60 TABLET | Refills: 0 | Status: SHIPPED | OUTPATIENT
Start: 2020-12-07 | End: 2021-03-04

## 2020-12-07 ASSESSMENT — MIFFLIN-ST. JEOR: SCORE: 1556.24

## 2020-12-07 NOTE — LETTER
12/7/2020         RE: Lasha Sims  [address]  [facility] [address]        Dear Colleague,    Thank you for referring your patient, Lasha Sims, to the [facility] [place]. Please see a copy of my visit note below.    Al returns to my clinic today for interval follow-up he is a very pleasant 55-year-old male he comes in today for his second viscosupplementation injection.    He states that he tolerated the first injection well.  He feels like his knee is getting worse.  He would like to have a discussion with arthroplasty.    Exam today is unchanged.  Ligament is largely stable.  1 quadrant anterior translation.  Varus and valgus stable.  Positive medial joint line tenderness.    Imaging shows medial compartment arthritis as well as lateral compartment arthritis multiple years following ACL reconstruction    Clinical assessment: Osteoarthritis left knee    Plan: Viscosupplementation injection #2.    After informed consent was obtained under sterile technique 1 ampoule of Supartz was inject without complication.  Patient tolerated the injection well.  Sterile dressings were applied.    He will return to my clinic next week for injection #3.  We will place a referral for arthroplasty to discuss the pros and cons as well as the recovery of joint replacement surgery.      Large Joint Injection/Arthocentesis: L knee joint    Date/Time: 12/7/2020 1:35 PM  Performed by: Bobby Underwood MD  Authorized by: Bobby Underwood MD     Needle Size:  22 G  Guidance: landmark guided    Approach:  Anterolateral  Location:  Knee      Medications:  25 mg sodium hyaluronate 25 MG/2.5ML  Outcome:  Tolerated well, no immediate complications  Procedure discussed: discussed risks, benefits, and alternatives    Consent Given by:  Patient  Timeout: timeout called immediately prior to procedure    Prep: patient was prepped and draped in usual sterile fashion     Scribed by Jessica  DORITA Watt for Dr. Underwood on 12/7/20 at 1:30PM, based on the provider s statements to me.      Again, thank you for allowing me to participate in the care of your patient.        Sincerely,        Bobby Underwood MD

## 2020-12-07 NOTE — NURSING NOTE
[address] [address]  Dept: [phone]  ______________________________________________________________________________    Patient: Lasha Sims   : 1965   MRN: [id number]   2020    INVASIVE PROCEDURE SAFETY CHECKLIST    Date: 20   Procedure: Left knee Supartz injection #2 of 3  Patient Name: Lasha Sims  MRN: [id number]  YOB: 1965    Action: Complete sections as appropriate. Any discrepancy results in a HARD COPY until resolved.     PRE PROCEDURE:  Patient ID verified with 2 identifiers (name and  or MRN): Yes  Procedure and site verified with patient/designee (when able): Yes  Accurate consent documentation in medical record: Yes  H&P (or appropriate assessment) documented in medical record: Yes  H&P must be up to 20 days prior to procedure and updates within 24 hours of procedure as applicable: NA  Relevant diagnostic and radiology test results appropriately labeled and displayed as applicable: Yes  Procedure site(s) marked with provider initials: NA    TIMEOUT:  Time-Out performed immediately prior to starting procedure, including verbal and active participation of all team members addressing the following:Yes  * Correct patient identify  * Confirmed that the correct side and site are marked  * An accurate procedure consent form  * Agreement on the procedure to be done  * Correct patient position  * Relevant images and results are properly labeled and appropriately displayed  * The need to administer antibiotics or fluids for irrigation purposes during the procedure as applicable   * Safety precautions based on patient history or medication use    DURING PROCEDURE: Verification of correct person, site, and procedures any time the responsibility for care of the patient is transferred to another member of the care team.       Prior to injection, verified patient identity using patient's name and date of  birth.  Due to injection administration, patient instructed to remain in clinic for 15 minutes  afterwards, and to report any adverse reaction to me immediately.    Joint injection was performed.      Drug Amount Wasted:  None.  Vial/Syringe: Single dose vial  Expiration Date:  4/30/22      Jessica Watt, [facility]  December 7, 2020

## 2020-12-07 NOTE — PROGRESS NOTES
Al returns to my clinic today for interval follow-up he is a very pleasant 55-year-old male he comes in today for his second viscosupplementation injection.    He states that he tolerated the first injection well.  He feels like his knee is getting worse.  He would like to have a discussion with arthroplasty.    Exam today is unchanged.  Ligament is largely stable.  1 quadrant anterior translation.  Varus and valgus stable.  Positive medial joint line tenderness.    Imaging shows medial compartment arthritis as well as lateral compartment arthritis multiple years following ACL reconstruction    Clinical assessment: Osteoarthritis left knee    Plan: Viscosupplementation injection #2.    After informed consent was obtained under sterile technique 1 ampoule of Supartz was inject without complication.  Patient tolerated the injection well.  Sterile dressings were applied.    He will return to my clinic next week for injection #3.  We will place a referral for arthroplasty to discuss the pros and cons as well as the recovery of joint replacement surgery.      Large Joint Injection/Arthocentesis: L knee joint    Date/Time: 12/7/2020 1:35 PM  Performed by: Bobby Underwood MD  Authorized by: Bobby Underwood MD     Needle Size:  22 G  Guidance: landmark guided    Approach:  Anterolateral  Location:  Knee      Medications:  25 mg sodium hyaluronate 25 MG/2.5ML  Outcome:  Tolerated well, no immediate complications  Procedure discussed: discussed risks, benefits, and alternatives    Consent Given by:  Patient  Timeout: timeout called immediately prior to procedure    Prep: patient was prepped and draped in usual sterile fashion     Scribed by Jessica Watt ATC for Dr. Underwood on 12/7/20 at 1:30PM, based on the provider s statements to me.

## 2020-12-08 ENCOUNTER — MYC REFILL (OUTPATIENT)
Dept: PEDIATRICS | Facility: CLINIC | Age: 55
End: 2020-12-08

## 2020-12-08 DIAGNOSIS — E78.00 HYPERCHOLESTEREMIA: ICD-10-CM

## 2020-12-11 RX ORDER — SIMVASTATIN 10 MG
10 TABLET ORAL AT BEDTIME
Qty: 90 TABLET | Refills: 3 | OUTPATIENT
Start: 2020-12-11

## 2020-12-11 NOTE — TELEPHONE ENCOUNTER
simvastatin (ZOCOR) 10 MG tablet 90 tablet 3 12/9/2020  No   Sig - Route: Take 1 tablet (10 mg) by mouth At Bedtime - Oral   Sent to pharmacy as: Simvastatin 10 MG Oral Tablet (ZOCOR)   Class: E-Prescribe   Order: [id number]     Too soon to refill    Yanna Jimenez RN  [facility]

## 2020-12-14 ENCOUNTER — OFFICE VISIT (OUTPATIENT)
Dept: ORTHOPEDICS | Facility: CLINIC | Age: 55
End: 2020-12-14
Payer: COMMERCIAL

## 2020-12-14 VITALS — WEIGHT: 162 LBS | HEIGHT: 69 IN | BODY MASS INDEX: 23.99 KG/M2

## 2020-12-14 DIAGNOSIS — M17.0 PRIMARY OSTEOARTHRITIS OF BOTH KNEES: Primary | ICD-10-CM

## 2020-12-14 PROCEDURE — 20610 DRAIN/INJ JOINT/BURSA W/O US: CPT | Mod: LT | Performed by: ORTHOPAEDIC SURGERY

## 2020-12-14 PROCEDURE — 99207 PR DROP WITH A PROCEDURE: CPT | Performed by: ORTHOPAEDIC SURGERY

## 2020-12-14 ASSESSMENT — MIFFLIN-ST. JEOR: SCORE: 1556.24

## 2020-12-14 NOTE — PROGRESS NOTES
Large Joint Injection/Arthocentesis: L knee joint    Date/Time: 12/14/2020 1:18 PM  Performed by: Bobby Underwood MD  Authorized by: Bboby Underwood MD     Indications:  Osteoarthritis  Needle Size:  22 G  Guidance: landmark guided    Approach:  Anterolateral  Location:  Knee      Medications:  25 mg sodium hyaluronate 25 MG/2.5ML  Outcome:  Tolerated well, no immediate complications  Procedure discussed: discussed risks, benefits, and alternatives    Consent Given by:  Patient  Timeout: timeout called immediately prior to procedure    Prep: patient was prepped and draped in usual sterile fashion     Scribed by Rashmi Quinones, MS, LAT, ATC for Dr. Underwood on 12/14/2020 at 1:19 PM, based on the provider s statements to me.

## 2020-12-14 NOTE — PROGRESS NOTES
Lasha Sims Returns to my clinic today for interval follow-up.  He is a pleasant 55-year-old male who returns for his third viscosupplementation injection.    His exam is unchanged.  He tolerates injection well.  He has not noted a big change but feels it might be a little bit better    Exam shows no change about his knee.  No sign of infection.    Clinical assessment osteoarthritis left knee    Plan: After informed consent was obtained under sterile technique 1 ampoule of Supartz was injected without complication.  Patient tolerated this well.  Sterile dressings were applied.  Follow-up as needed.  We can repeat this in 6 months if it helps.

## 2020-12-14 NOTE — NURSING NOTE
[address] [address]  Dept: [phone]  ______________________________________________________________________________    Patient: Lasha Sims   : 1965   MRN: [id number]   2020    INVASIVE PROCEDURE SAFETY CHECKLIST    Date: 2020   Procedure: Left knee Supartz injection 3/3  Patient Name: Lasha Sims  MRN: [id number]  YOB: 1965    Action: Complete sections as appropriate. Any discrepancy results in a HARD COPY until resolved.     PRE PROCEDURE:  Patient ID verified with 2 identifiers (name and  or MRN): Yes  Procedure and site verified with patient/designee (when able): Yes  Accurate consent documentation in medical record: Yes  H&P (or appropriate assessment) documented in medical record: Yes  H&P must be up to 20 days prior to procedure and updates within 24 hours of procedure as applicable: NA  Relevant diagnostic and radiology test results appropriately labeled and displayed as applicable: Yes  Procedure site(s) marked with provider initials: NA    TIMEOUT:  Time-Out performed immediately prior to starting procedure, including verbal and active participation of all team members addressing the following:Yes  * Correct patient identify  * Confirmed that the correct side and site are marked  * An accurate procedure consent form  * Agreement on the procedure to be done  * Correct patient position  * Relevant images and results are properly labeled and appropriately displayed  * The need to administer antibiotics or fluids for irrigation purposes during the procedure as applicable   * Safety precautions based on patient history or medication use    DURING PROCEDURE: Verification of correct person, site, and procedures any time the responsibility for care of the patient is transferred to another member of the care team.       Prior to injection, verified patient identity using patient's name and date of  birth.  Due to injection administration, patient instructed to remain in clinic for 15 minutes  afterwards, and to report any adverse reaction to me immediately.    Joint injection was performed.      Drug Amount Wasted:  None.  Vial/Syringe: Syringe  Expiration Date:  2021-04-30      Rashmi Quinones, ATC  December 14, 2020

## 2020-12-14 NOTE — LETTER
12/14/2020         RE: Lasha Sims  [address]  [facility] [address]        Dear Colleague,    Thank you for referring your patient, Lasha Sims, to the [facility] [place]. Please see a copy of my visit note below.    Large Joint Injection/Arthocentesis: L knee joint    Date/Time: 12/14/2020 1:18 PM  Performed by: Bobby Underwood MD  Authorized by: Bobby Underwood MD     Indications:  Osteoarthritis  Needle Size:  22 G  Guidance: landmark guided    Approach:  Anterolateral  Location:  Knee      Medications:  25 mg sodium hyaluronate 25 MG/2.5ML  Outcome:  Tolerated well, no immediate complications  Procedure discussed: discussed risks, benefits, and alternatives    Consent Given by:  Patient  Timeout: timeout called immediately prior to procedure    Prep: patient was prepped and draped in usual sterile fashion     Scribed by Rashmi Quinones MS, LAT, ATC for Dr. Underwood on 12/14/2020 at 1:19 PM, based on the provider s statements to me.            Lasha Sims Returns to my clinic today for interval follow-up.  He is a pleasant 55-year-old male who returns for his third viscosupplementation injection.    His exam is unchanged.  He tolerates injection well.  He has not noted a big change but feels it might be a little bit better    Exam shows no change about his knee.  No sign of infection.    Clinical assessment osteoarthritis left knee    Plan: After informed consent was obtained under sterile technique 1 ampoule of Supartz was injected without complication.  Patient tolerated this well.  Sterile dressings were applied.  Follow-up as needed.  We can repeat this in 6 months if it helps.      Again, thank you for allowing me to participate in the care of your patient.        Sincerely,        Bobby Underwood MD

## 2020-12-28 NOTE — TELEPHONE ENCOUNTER
RECORDS RECEIVED FROM: Discuss L knee TKA per pt    Imaging in [facility]   DATE RECEIVED: Jan 27, 2021     NOTES STATUS DETAILS   OFFICE NOTE from referring provider Internal    OFFICE NOTE from other specialist N/A    DISCHARGE SUMMARY from hospital N/A    DISCHARGE REPORT from the ER N/A    OPERATIVE REPORT N/A    MEDICATION LIST Internal    IMPLANT RECORD/STICKER N/A    LABS     CBC/DIFF N/A    CULTURES N/A    INJECTIONS DONE IN RADIOLOGY N/A    MRI Internal    CT SCAN N/A    XRAYS (IMAGES & REPORTS) Internal    TUMOR     PATHOLOGY  Slides & report N/A    12/28/20   10:00 AM   Complete  Bijal Sharma CMA

## 2021-01-20 ASSESSMENT — ENCOUNTER SYMPTOMS
MUSCLE CRAMPS: 0
ARTHRALGIAS: 1
MYALGIAS: 0
STIFFNESS: 1
NECK PAIN: 0
MUSCLE WEAKNESS: 0
BACK PAIN: 0
JOINT SWELLING: 1

## 2021-01-20 ASSESSMENT — ACTIVITIES OF DAILY LIVING (ADL): FUNCTION,_DAILY_LIVING_SCORE: 77.94

## 2021-01-20 ASSESSMENT — KOOS JR: HOW SEVERE IS YOUR KNEE STIFFNESS AFTER FIRST WAKING IN MORNING: MODERATE

## 2021-01-26 DIAGNOSIS — M17.12 PRIMARY OSTEOARTHRITIS OF LEFT KNEE: Primary | ICD-10-CM

## 2021-01-26 NOTE — PROGRESS NOTES
[facility]  Orthopaedic Surgery Consultation by Ton Jackson M.D.    Lasha Sims MRN# [id number]   Age: 55 year old YOB: 1965     Requesting physician: Myesha Carter     Background history:  DX:  1. Spontaneous pneumothorax  2. Hyperlipidemia  3. Hypercholesterolemia  4. Depressive disorder    TREATMENTS:  2004, ACL reconstruction left knee, [employer] [facility].            History of Present Illness:   55 year old male referred to us by Dr. Underwood because of persistent and chronic left knee pain was likely due to medial compartmental and patellofemoral osteoarthritic changes.  Patient has a past medical history that is notable for ACL repair left knee.  He recently underwent his third viscosupplementation injection. Patient has been experiencing chronic and progressive pain on the medial side of the left knee. It swells on occasion. He is also recently noticing a swelling on the lateral side of his knee which he was told was a lateral meniscal cyst. The pain is present during extended periods of standing and sitting. Patient endorses the presence of night pain, initiation pain and stiffness. He is unable to walk his dog however he is still able to do squats during his [employer] exercises. Mitigate the pain he uses over-the-counter analgesics. He has seen a physical therapist trying to optimize his current situation. He has not tried an  brace. Patient is otherwise healthy and lives together with his spouse.    Current symptoms:  Problem: Left knee pain  Onset and duration: Multiple years  Awakens from sleep due to sx's:  Yes  Precipitating Injury:  No    Other joints or sites painful:  No    Social:   Occupation: Desk job  Living situation: With spouse  Hobbies / Sports: CrossFit, walking dog  Smoking: No  Alcohol: No  Illicit drug use: No         Physical Exam:     EXAMINATION pertinent findings:   PSYCH: Pleasant, healthy-appearing, alert,  oriented x3, cooperative. Normal mood and affect.  VITAL SIGNS: There were no vitals taken for this visit.  Reviewed nursing intake notes.   There is no height or weight on file to calculate BMI.  RESP: non labored breathing   ABD: benign, soft, non-tender, no acute peritoneal findings  SKIN: grossly normal   LYMPHATIC: grossly normal, no adenopathy, no extremity edema  NEURO: grossly normal , no motor deficits  VASCULAR: satisfactory perfusion of all extremities   MUSCULOSKELETAL:   Alignment: Varus   Gait: Normal  The left hip exhibits a full range of motion. Rotations are not painful. Lasegue's test is negative.  L knee: -0-0  . Straight leg raise +. No redness, warmth or skin changes present. Effusion No. Ligamentously stable in both ML and AP direction. There is some laxity medial to lateral most likely due to substance loss. Normal PF tracking without crepitus.  Meniscal provocation tests are sensitive. There is some tenderness to palpation over the medial joint line. There is a waxing and waning swelling present at the level of the lateral joint line potentially a lateral meniscal cyst.        Left LE:   Thigh and leg compartments soft and compressible   +Quad/TA/GSC/FHL/EHL   SILT DP/SP/Dionne/Saph/Tib nerve distributions   Palpable dorsalis pedis pulse            Data:   All laboratory data reviewed  All imaging studies reviewed by me personally.    XR knee left 9/2/2020:  Joint space narrowing of medial compartment. Some loss of height lateral compartment as well. Varus alignment.    MRI knee left 9/8/2020:  Status post ACL reconstruction. Intact graft. Possible ganglion cyst and lateral meniscus tear. Degenerative tearing of the medial meniscus. Severe osteoarthritic change of the medial compartment with subchondral edema. The patellofemoral joint and lateral compartment articular surfaces are not pristine but in relatively good condition.            Assessment and Plan:   Assessment:  55-year-old  male status post ACL reconstruction and varus alignment with pain of left knee most likely due to medial compartmental osteoarthritic changes. Furthermore, there is the presence of a potential lateral meniscal cyst or ganglion cyst and the presence of some early osteoarthritic change in the patellofemoral and lateral compartment.     Plan:  I had a long discussion with the patient regarding ongoing management options.  Reviewed surgical and nonsurgical treatments.  The non-surgical options include activity modification, pain medication, PT, bracing and injection therapy. As for surgery we discussed the options of osteotomies, partial and total knee replacement surgery. We reviewed partial and total knee replacement in detail including the procedure, the implants, the recovery process, and long-term outcomes.  We reviewed that the risks of the surgery include but are not limited to infection, wound problems, stiffness, persistent pain, swelling, clicking, loosening, revision surgery.  We also reviewed less common risks such as neurovascular injury fracture, and other implant-related issues.  We reviewed other medical complications such as a blood clot.  We discussed that the vast majority of cases have a highly successful outcome.  However there is a small subset of patients that do experience complications or problems following the knee replacement and these problems can be very debilitating and painful and sometimes do not improve.   Based on a discussion of the risks and benefits, we believe that the benefits far outweigh the risks at this point and the patient  would like to proceed with surgery. Given his age and general health, in spite of the what appears to be minimal chondromalacic changes of lateral and patellofemoral compartments it may be best to proceed with a unicondylar arthroplasty of medial compartment left knee. To address the potential ganglion cyst or lateral meniscal tear in the same setting a  arthroscopy will be performed to address these issues.  We discussed that if the arthritic changes in these compartments were significant we should convert to a total knee arthroplasty. Patient understands and agrees to the treatment plan as set forth. We will work on scheduling surgery at a time that works well for him in the next few months.   P will contact us if they have any questions or concerns leading up to surgery. Before surgery the patient will attend a joint replacement class and will be seen by the PCP and dentist.     More information on joint replacements can be found on : [email or web address]      Thank you for your referral      Ton Jackson MD, PhD     Adult Reconstruction  [facility] Department of Orthopaedic Surgery  Pager [phone]      DATA for DOCUMENTATION:         Past Medical History:     Patient Active Problem List   Diagnosis     Osteoarthritis of left knee     Major depression, chronic     Generalized anxiety disorder     Hypercholesteremia     Other neutropenia (H)     IFG (impaired fasting glucose)     Past Medical History:   Diagnosis Date     Depressive disorder 2001     Generalized anxiety disorder 1990     Major depression, chronic      Mild hyperlipidemia      Osteoarthritis of left knee      Other neutropenia (H) 2/6/2020     Spontaneous pneumothorax 10/2010    Right side       Also see scanned health assessment forms.       Past Surgical History:     Past Surgical History:   Procedure Laterality Date     APPENDECTOMY OPEN N/A      COLONOSCOPY  2017     HERNIA REPAIR Left      KNEE SURGERY Left 2003    ACL repair and partioal lateral manisectomy     ORTHOPEDIC SURGERY  2003    ACL repair left     THORACOSCOPY Right 10/19/2010    for pneumothorax. upper lobe wedge resection            Social History:     Social History     Socioeconomic History     Marital status:      Spouse name: Not  on file     Number of children: Not on file     Years of education: Not on file     Highest education level: Not on file   Occupational History     Not on file   Social Needs     Financial resource strain: Not on file     Food insecurity     Worry: Not on file     Inability: Not on file     Transportation needs     Medical: Not on file     Non-medical: Not on file   Tobacco Use     Smoking status: Never Smoker     Smokeless tobacco: Never Used     Tobacco comment: na   Substance and Sexual Activity     Alcohol use: Yes     Frequency: Monthly or less     Drug use: Never     Sexual activity: Yes     Partners: Female     Birth control/protection: None   Lifestyle     Physical activity     Days per week: Not on file     Minutes per session: Not on file     Stress: Not on file   Relationships     Social connections     Talks on phone: Not on file     Gets together: Not on file     Attends Hinduism service: Not on file     Active member of club or organization: Not on file     Attends meetings of clubs or organizations: Not on file     Relationship status: Not on file     Intimate partner violence     Fear of current or ex partner: Not on file     Emotionally abused: Not on file     Physically abused: Not on file     Forced sexual activity: Not on file   Other Topics Concern     Parent/sibling w/ CABG, MI or angioplasty before 65F 55M? No   Social History Narrative     Not on file            Family History:       Family History   Problem Relation Age of Onset     Hyperlipidemia Mother      Multiple myeloma Father 60     Other Cancer Father         multiple myloma     Prostate Cancer Brother      Brain Cancer Paternal Grandfather 60     Depression Daughter             Medications:     Current Outpatient Medications   Medication Sig     diclofenac (VOLTAREN) 75 MG EC tablet Take 1 tablet (75 mg) by mouth 2 times daily     FLUoxetine (PROZAC) 20 MG capsule Take 1 capsule (20 mg) by mouth daily     simvastatin (ZOCOR) 10 MG  tablet Take 1 tablet (10 mg) by mouth At Bedtime     sodium hyaluronate (SUPARTZ) 25 MG/2.5ML injection 2.5 mLs (25 mg) by INTRA-ARTICULAR route once a week     Current Facility-Administered Medications   Medication     lidocaine (PF) 0.5 % injection SOLN 8 mL     lidocaine (PF) 0.5 % injection SOLN 8 mL     sodium hyaluronate (SUPARTZ) injection 25 mg     sodium hyaluronate (SUPARTZ) injection 25 mg     sodium hyaluronate (SUPARTZ) injection 25 mg     triamcinolone (KENALOG-40) injection 40 mg     triamcinolone (KENALOG-40) injection 40 mg              Review of Systems:   A comprehensive 10 point review of systems (constitutional, ENT, cardiac, peripheral vascular, lymphatic, respiratory, GI, , Musculoskeletal, skin, Neurological) was performed and found to be negative except as described in this note.     See intake form completed by patient      Answers for HPI/ROS submitted by the patient on 1/20/2021   General Symptoms: No  Skin Symptoms: No  HENT Symptoms: No  EYE SYMPTOMS: No  HEART SYMPTOMS: No  LUNG SYMPTOMS: No  INTESTINAL SYMPTOMS: No  URINARY SYMPTOMS: No  REPRODUCTIVE SYMPTOMS: No  SKELETAL SYMPTOMS: Yes  BLOOD SYMPTOMS: No  NERVOUS SYSTEM SYMPTOMS: No  MENTAL HEALTH SYMPTOMS: No  Back pain: No  Muscle aches: No  Neck pain: No  Swollen joints: Yes  Joint pain: Yes  Bone pain: Yes  Muscle cramps: No  Muscle weakness: No  Joint stiffness: Yes  Bone fracture: No

## 2021-01-27 ENCOUNTER — OFFICE VISIT (OUTPATIENT)
Dept: ORTHOPEDICS | Facility: CLINIC | Age: 56
End: 2021-01-27
Payer: COMMERCIAL

## 2021-01-27 ENCOUNTER — ANCILLARY PROCEDURE (OUTPATIENT)
Dept: GENERAL RADIOLOGY | Facility: CLINIC | Age: 56
End: 2021-01-27
Attending: STUDENT IN AN ORGANIZED HEALTH CARE EDUCATION/TRAINING PROGRAM
Payer: COMMERCIAL

## 2021-01-27 ENCOUNTER — PRE VISIT (OUTPATIENT)
Dept: ORTHOPEDICS | Facility: CLINIC | Age: 56
End: 2021-01-27

## 2021-01-27 ENCOUNTER — TELEPHONE (OUTPATIENT)
Dept: ORTHOPEDICS | Facility: CLINIC | Age: 56
End: 2021-01-27

## 2021-01-27 VITALS — WEIGHT: 162.9 LBS | BODY MASS INDEX: 24.13 KG/M2 | HEIGHT: 69 IN

## 2021-01-27 DIAGNOSIS — M17.12 PRIMARY OSTEOARTHRITIS OF LEFT KNEE: ICD-10-CM

## 2021-01-27 DIAGNOSIS — M17.12 OSTEOARTHRITIS OF LEFT KNEE, UNSPECIFIED OSTEOARTHRITIS TYPE: Primary | ICD-10-CM

## 2021-01-27 PROCEDURE — 99215 OFFICE O/P EST HI 40 MIN: CPT | Performed by: STUDENT IN AN ORGANIZED HEALTH CARE EDUCATION/TRAINING PROGRAM

## 2021-01-27 PROCEDURE — 77073 BONE LENGTH STUDIES: CPT | Performed by: RADIOLOGY

## 2021-01-27 RX ORDER — CEFAZOLIN SODIUM 1 G/50ML
1 INJECTION, SOLUTION INTRAVENOUS SEE ADMIN INSTRUCTIONS
Status: CANCELLED | OUTPATIENT
Start: 2021-01-27

## 2021-01-27 RX ORDER — CEFAZOLIN SODIUM 2 G/50ML
2 SOLUTION INTRAVENOUS
Status: CANCELLED | OUTPATIENT
Start: 2021-01-27

## 2021-01-27 RX ORDER — TRANEXAMIC ACID 650 MG/1
1950 TABLET ORAL ONCE
Status: CANCELLED | OUTPATIENT
Start: 2021-01-27 | End: 2021-01-27

## 2021-01-27 ASSESSMENT — MIFFLIN-ST. JEOR: SCORE: 1562.67

## 2021-01-27 NOTE — NURSING NOTE
"Reason For Visit:   Chief Complaint   Patient presents with     Consult     discuss Left TKA        Ht 1.75 m (5' 8.9\")   Wt 73.9 kg (162 lb 14.4 oz)   BMI 24.13 kg/m      Pain Assessment  Patient Currently in Pain: Yes  0-10 Pain Scale: 2  Primary Pain Location: Knee  Aggravating Factors: Walking(twisting/turning)    Ann Arzate ATC   "

## 2021-01-27 NOTE — TELEPHONE ENCOUNTER
Phoned patient to schedule surgery with Dr Jackson. I left him my direct number to call back to schedule at his convenience. [phone].

## 2021-01-27 NOTE — LETTER
1/27/2021         RE: Lasha Sims  [address] [address] [address]        Dear Colleague,    Thank you for referring your patient, Lasha Sims, to the [facility] [place]. Please see a copy of my visit note below.        [facility]  Orthopaedic Surgery Consultation by Ton Jackson M.D.    Lasha Sims MRN# [id number]   Age: 55 year old YOB: 1965     Requesting physician: Myesha Carter     Background history:  DX:  1. Spontaneous pneumothorax  2. Hyperlipidemia  3. Hypercholesterolemia  4. Depressive disorder    TREATMENTS:  2004, ACL reconstruction left knee, [facility].            History of Present Illness:   55 year old male referred to us by Dr. Underwood because of persistent and chronic left knee pain was likely due to medial compartmental and patellofemoral osteoarthritic changes.  Patient has a past medical history that is notable for ACL repair left knee.  He recently underwent his third viscosupplementation injection. Patient has been experiencing chronic and progressive pain on the medial side of the left knee. It swells on occasion. He is also recently noticing a swelling on the lateral side of his knee which he was told was a lateral meniscal cyst. The pain is present during extended periods of standing and sitting. Patient endorses the presence of night pain, initiation pain and stiffness. He is unable to walk his dog however he is still able to do squats during his [employer] exercises. Mitigate the pain he uses over-the-counter analgesics. He has seen a physical therapist trying to optimize his current situation. He has not tried an  brace. Patient is otherwise healthy and lives together with his spouse.    Current symptoms:  Problem: Left knee pain  Onset and duration: Multiple years  Awakens from sleep due to sx's:  Yes  Precipitating Injury:  No    Other joints or sites painful:   No    Social:   Occupation: Desk job  Living situation: With spouse  Hobbies / Sports: CrossFit, walking dog  Smoking: No  Alcohol: No  Illicit drug use: No         Physical Exam:     EXAMINATION pertinent findings:   PSYCH: Pleasant, healthy-appearing, alert, oriented x3, cooperative. Normal mood and affect.  VITAL SIGNS: There were no vitals taken for this visit.  Reviewed nursing intake notes.   There is no height or weight on file to calculate BMI.  RESP: non labored breathing   ABD: benign, soft, non-tender, no acute peritoneal findings  SKIN: grossly normal   LYMPHATIC: grossly normal, no adenopathy, no extremity edema  NEURO: grossly normal , no motor deficits  VASCULAR: satisfactory perfusion of all extremities   MUSCULOSKELETAL:   Alignment: Varus   Gait: Normal  The left hip exhibits a full range of motion. Rotations are not painful. Lasegue's test is negative.  L knee: -0-0  . Straight leg raise +. No redness, warmth or skin changes present. Effusion No. Ligamentously stable in both ML and AP direction. There is some laxity medial to lateral most likely due to substance loss. Normal PF tracking without crepitus.  Meniscal provocation tests are sensitive. There is some tenderness to palpation over the medial joint line. There is a waxing and waning swelling present at the level of the lateral joint line potentially a lateral meniscal cyst.        Left LE:   Thigh and leg compartments soft and compressible   +Quad/TA/GSC/FHL/EHL   SILT DP/SP/Dionne/Saph/Tib nerve distributions   Palpable dorsalis pedis pulse            Data:   All laboratory data reviewed  All imaging studies reviewed by me personally.    XR knee left 9/2/2020:  Joint space narrowing of medial compartment. Some loss of height lateral compartment as well. Varus alignment.    MRI knee left 9/8/2020:  Status post ACL reconstruction. Intact graft. Possible ganglion cyst and lateral meniscus tear. Degenerative tearing of the medial meniscus.  Severe osteoarthritic change of the medial compartment with subchondral edema. The patellofemoral joint and lateral compartment articular surfaces are not pristine but in relatively good condition.            Assessment and Plan:   Assessment:  55-year-old male status post ACL reconstruction and varus alignment with pain of left knee most likely due to medial compartmental osteoarthritic changes. Furthermore, there is the presence of a potential lateral meniscal cyst or ganglion cyst and the presence of some early osteoarthritic change in the patellofemoral and lateral compartment.     Plan:  I had a long discussion with the patient regarding ongoing management options.  Reviewed surgical and nonsurgical treatments.  The non-surgical options include activity modification, pain medication, PT, bracing and injection therapy. As for surgery we discussed the options of osteotomies, partial and total knee replacement surgery. We reviewed partial and total knee replacement in detail including the procedure, the implants, the recovery process, and long-term outcomes.  We reviewed that the risks of the surgery include but are not limited to infection, wound problems, stiffness, persistent pain, swelling, clicking, loosening, revision surgery.  We also reviewed less common risks such as neurovascular injury fracture, and other implant-related issues.  We reviewed other medical complications such as a blood clot.  We discussed that the vast majority of cases have a highly successful outcome.  However there is a small subset of patients that do experience complications or problems following the knee replacement and these problems can be very debilitating and painful and sometimes do not improve.   Based on a discussion of the risks and benefits, we believe that the benefits far outweigh the risks at this point and the patient  would like to proceed with surgery. Given his age and general health, in spite of the what appears to  be minimal chondromalacic changes of lateral and patellofemoral compartments it may be best to proceed with a unicondylar arthroplasty of medial compartment left knee. To address the potential ganglion cyst or lateral meniscal tear in the same setting a arthroscopy will be performed to address these issues.  We discussed that if the arthritic changes in these compartments were significant we should convert to a total knee arthroplasty. Patient understands and agrees to the treatment plan as set forth. We will work on scheduling surgery at a time that works well for him in the next few months.   P will contact us if they have any questions or concerns leading up to surgery. Before surgery the patient will attend a joint replacement class and will be seen by the PCP and dentist.     More information on joint replacements can be found on : [email or web address]      Thank you for your referral      Ton Jackson MD, PhD     Adult Reconstruction  [facility] Department of Orthopaedic Surgery  Pager [phone]      DATA for DOCUMENTATION:         Past Medical History:     Patient Active Problem List   Diagnosis     Osteoarthritis of left knee     Major depression, chronic     Generalized anxiety disorder     Hypercholesteremia     Other neutropenia (H)     IFG (impaired fasting glucose)     Past Medical History:   Diagnosis Date     Depressive disorder 2001     Generalized anxiety disorder 1990     Major depression, chronic      Mild hyperlipidemia      Osteoarthritis of left knee      Other neutropenia (H) 2/6/2020     Spontaneous pneumothorax 10/2010    Right side       Also see scanned health assessment forms.       Past Surgical History:     Past Surgical History:   Procedure Laterality Date     APPENDECTOMY OPEN N/A      COLONOSCOPY  2017     HERNIA REPAIR Left      KNEE SURGERY Left 2003    ACL repair and partioal lateral  manisectomy     ORTHOPEDIC SURGERY  2003    ACL repair left     THORACOSCOPY Right 10/19/2010    for pneumothorax. upper lobe wedge resection            Social History:     Social History     Socioeconomic History     Marital status:      Spouse name: Not on file     Number of children: Not on file     Years of education: Not on file     Highest education level: Not on file   Occupational History     Not on file   Social Needs     Financial resource strain: Not on file     Food insecurity     Worry: Not on file     Inability: Not on file     Transportation needs     Medical: Not on file     Non-medical: Not on file   Tobacco Use     Smoking status: Never Smoker     Smokeless tobacco: Never Used     Tobacco comment: na   Substance and Sexual Activity     Alcohol use: Yes     Frequency: Monthly or less     Drug use: Never     Sexual activity: Yes     Partners: Female     Birth control/protection: None   Lifestyle     Physical activity     Days per week: Not on file     Minutes per session: Not on file     Stress: Not on file   Relationships     Social connections     Talks on phone: Not on file     Gets together: Not on file     Attends Spiritism service: Not on file     Active member of club or organization: Not on file     Attends meetings of clubs or organizations: Not on file     Relationship status: Not on file     Intimate partner violence     Fear of current or ex partner: Not on file     Emotionally abused: Not on file     Physically abused: Not on file     Forced sexual activity: Not on file   Other Topics Concern     Parent/sibling w/ CABG, MI or angioplasty before 65F 55M? No   Social History Narrative     Not on file            Family History:       Family History   Problem Relation Age of Onset     Hyperlipidemia Mother      Multiple myeloma Father 60     Other Cancer Father         multiple myloma     Prostate Cancer Brother      Brain Cancer Paternal Grandfather 60     Depression Daughter              Medications:     Current Outpatient Medications   Medication Sig     diclofenac (VOLTAREN) 75 MG EC tablet Take 1 tablet (75 mg) by mouth 2 times daily     FLUoxetine (PROZAC) 20 MG capsule Take 1 capsule (20 mg) by mouth daily     simvastatin (ZOCOR) 10 MG tablet Take 1 tablet (10 mg) by mouth At Bedtime     sodium hyaluronate (SUPARTZ) 25 MG/2.5ML injection 2.5 mLs (25 mg) by INTRA-ARTICULAR route once a week     Current Facility-Administered Medications   Medication     lidocaine (PF) 0.5 % injection SOLN 8 mL     lidocaine (PF) 0.5 % injection SOLN 8 mL     sodium hyaluronate (SUPARTZ) injection 25 mg     sodium hyaluronate (SUPARTZ) injection 25 mg     sodium hyaluronate (SUPARTZ) injection 25 mg     triamcinolone (KENALOG-40) injection 40 mg     triamcinolone (KENALOG-40) injection 40 mg              Review of Systems:   A comprehensive 10 point review of systems (constitutional, ENT, cardiac, peripheral vascular, lymphatic, respiratory, GI, , Musculoskeletal, skin, Neurological) was performed and found to be negative except as described in this note.     See intake form completed by patient      Answers for HPI/ROS submitted by the patient on 1/20/2021   General Symptoms: No  Skin Symptoms: No  HENT Symptoms: No  EYE SYMPTOMS: No  HEART SYMPTOMS: No  LUNG SYMPTOMS: No  INTESTINAL SYMPTOMS: No  URINARY SYMPTOMS: No  REPRODUCTIVE SYMPTOMS: No  SKELETAL SYMPTOMS: Yes  BLOOD SYMPTOMS: No  NERVOUS SYSTEM SYMPTOMS: No  MENTAL HEALTH SYMPTOMS: No  Back pain: No  Muscle aches: No  Neck pain: No  Swollen joints: Yes  Joint pain: Yes  Bone pain: Yes  Muscle cramps: No  Muscle weakness: No  Joint stiffness: Yes  Bone fracture: No

## 2021-01-27 NOTE — NURSING NOTE
Teaching Flowsheet   Relevant Diagnosis: Partial Left knee Arthroplasty  Teaching Topic: Pre op teaching     Person(s) involved in teaching:   Patient     Motivation Level:  Asks Questions: Yes  Eager to Learn: Yes  Cooperative: Yes  Receptive (willing/able to accept information): Yes  Any cultural factors/Anabaptism beliefs that may influence understanding or compliance? No       Patient demonstrates understanding of the following:  Reason for the appointment, diagnosis and treatment plan: Yes  Knowledge of proper use of medications and conditions for which they are ordered (with special attention to potential side effects or drug interactions): Yes  Which situations necessitate calling provider and whom to contact: Yes       Teaching Concerns Addressed: RN discussed all aspects of pre op and post op teaching with patient. Marina will call patient with surgery date. Patient will go to PCP for H and P.   Gave patient total joint packet and other pertinent information.       Proper use and care of meds (medical equip, care aids, etc.): Yes  Nutritional needs and diet plan: Yes  Pain management techniques: Yes  Wound Care: Yes  How and/when to access community resources: Yes     Instructional Materials Used/Given: Pre op surgery packet, total joint book, ABX reminder for dental procedure and antibacterial soap.     Time spent with patient: 15 minutes.

## 2021-01-28 PROBLEM — M17.12 OSTEOARTHRITIS OF LEFT KNEE, UNSPECIFIED OSTEOARTHRITIS TYPE: Status: ACTIVE | Noted: 2021-01-28

## 2021-02-23 DIAGNOSIS — Z11.59 ENCOUNTER FOR SCREENING FOR OTHER VIRAL DISEASES: ICD-10-CM

## 2021-03-04 ENCOUNTER — OFFICE VISIT (OUTPATIENT)
Dept: FAMILY MEDICINE | Facility: CLINIC | Age: 56
End: 2021-03-04
Payer: COMMERCIAL

## 2021-03-04 VITALS
OXYGEN SATURATION: 100 % | TEMPERATURE: 97.9 F | RESPIRATION RATE: 18 BRPM | HEIGHT: 69 IN | BODY MASS INDEX: 23.91 KG/M2 | SYSTOLIC BLOOD PRESSURE: 112 MMHG | WEIGHT: 161.4 LBS | DIASTOLIC BLOOD PRESSURE: 66 MMHG | HEART RATE: 59 BPM

## 2021-03-04 DIAGNOSIS — M17.12 PRIMARY OSTEOARTHRITIS OF LEFT KNEE: ICD-10-CM

## 2021-03-04 DIAGNOSIS — E78.00 HYPERCHOLESTEREMIA: ICD-10-CM

## 2021-03-04 DIAGNOSIS — I47.11 INAPPROPRIATE SINUS TACHYCARDIA (H): ICD-10-CM

## 2021-03-04 DIAGNOSIS — F32.9 MAJOR DEPRESSION, CHRONIC: ICD-10-CM

## 2021-03-04 DIAGNOSIS — Z01.818 PREOP GENERAL PHYSICAL EXAM: Primary | ICD-10-CM

## 2021-03-04 DIAGNOSIS — F41.1 GENERALIZED ANXIETY DISORDER: ICD-10-CM

## 2021-03-04 DIAGNOSIS — D70.8 OTHER NEUTROPENIA (H): ICD-10-CM

## 2021-03-04 PROCEDURE — 99214 OFFICE O/P EST MOD 30 MIN: CPT | Performed by: INTERNAL MEDICINE

## 2021-03-04 ASSESSMENT — MIFFLIN-ST. JEOR: SCORE: 1553.52

## 2021-03-04 ASSESSMENT — PAIN SCALES - GENERAL: PAINLEVEL: NO PAIN (0)

## 2021-03-04 NOTE — PROGRESS NOTES
[address] [address]  Phone: [phone]  Primary Provider: Myesha Short  Pre-op Performing Provider: ELVIN STEWART      PREOPERATIVE EVALUATION:  Today's date: 3/4/2021    Lasha Sims is a 55 year old male who presents for a preoperative evaluation.    Surgical Information:  Surgery/Procedure: Osteoarthritis of left knee, unspecified osteoarthritis type  Surgery Location: [facility]  Surgeon: Dr. Jackson  Surgery Date: 03/12/21  Time of Surgery: TBD  Where patient plans to recover: At home with family  Fax number for surgical facility: Note does not need to be faxed, will be available electronically in Epic.    Type of Anesthesia Anticipated: General    Assessment & Plan     The proposed surgical procedure is considered INTERMEDIATE risk.    1.  Preop general physical exam.  Examination was excellent.  Patient is medically optimized to undergo the planned surgical procedure.  2.  Primary osteoarthritis of left knee.  Patient to undergo unicondylar arthroplasty of the medial medial compartment with additional treatment of lateral meniscal tear and ganglion cyst.  Possible conversion to total knee arthroplasty.  3.  Generalized anxiety disorder under excellent control with fluoxetine.  4.  Major depression chronic excellent control with fluoxetine.  5.  Hypercholesterolemia well-controlled with simvastatin.  6.  Mild neutropenia-persistent.  Will check CBC with differential.  7.  Inappropriate sinus tachycardia post exercise extensive evaluated by EP cardiologist in 2017 with negative work-up which included echocardiogram, EKG Zio patch.        RECOMMENDATION:  APPROVAL GIVEN to proceed with proposed procedure, without further diagnostic evaluation.  [id number]    Subjective     HPI related to upcoming procedure:     55-year-old young man with primary osteoarthritis mostly involving the medial compartment of the left knee is to undergo  above-mentioned surgery.  He has failed conservative treatment.    He has anxiety and depression issues which are well controlled.  Dyslipidemia which has been well controlled with simvastatin.  He has had mild leukopenia with normal differential count which is remained unchanged.      He gives history of inappropriate tachycardia only occurring post exercise.  Lasting less than 10 minutes and occurring very infrequently, every several months.  He exercises vigorously and regularly.  There are no associated symptoms and he had a cardiac work-up by EP cardiologist in October 2017 at [facility] which included echocardiography heart, EKG and Zio patch.  That work-up was negative.  His last EKG was on 7/24/2019 at [facility] which showed sinus rhythm.    Preop Questions 3/3/2021   1. Have you ever had a heart attack or stroke? No   2. Have you ever had surgery on your heart or blood vessels, such as a stent placement, a coronary artery bypass, or surgery on an artery in your head, neck, heart, or legs? No   3. Do you have chest pain with activity? No   4. Do you have a history of  heart failure? No   5. Do you currently have a cold, bronchitis or symptoms of other infection? No   6. Do you have a cough, shortness of breath, or wheezing? No   7. Do you or anyone in your family have previous history of blood clots? No   8. Do you or does anyone in your family have a serious bleeding problem such as prolonged bleeding following surgeries or cuts? No   9. Have you ever had problems with anemia or been told to take iron pills? No   10. Have you had any abnormal blood loss such as black, tarry or bloody stools? No   11. Have you ever had a blood transfusion? No   12. Are you willing to have a blood transfusion if it is medically needed before, during, or after your surgery? Yes   13. Have you or any of your relatives ever had problems with anesthesia? No   14. Do you have sleep apnea, excessive snoring or daytime  drowsiness? No   15. Do you have any artifical heart valves or other implanted medical devices like a pacemaker, defibrillator, or continuous glucose monitor? No   16. Do you have artificial joints? No   17. Are you allergic to latex? No       Health Care Directive:  Patient does not have a Health Care Directive or Living Will: Patient states has Advance Directive and will bring in a copy to clinic.        Patient Active Problem List    Diagnosis Date Noted     Osteoarthritis of left knee, unspecified osteoarthritis type 01/28/2021     Priority: Medium     Added automatically from request for surgery [id number]       IFG (impaired fasting glucose) 10/20/2020     Priority: Medium     Other neutropenia (H) 02/06/2020     Priority: Medium     Hypercholesteremia 11/13/2019     Priority: Medium     Osteoarthritis of left knee      Priority: Medium     Major depression, chronic      Priority: Medium     Generalized anxiety disorder 01/01/1990     Priority: Medium      Past Medical History:   Diagnosis Date     Depressive disorder 2001     Generalized anxiety disorder 1990     Major depression, chronic      Mild hyperlipidemia      Osteoarthritis of left knee      Other neutropenia (H) 2/6/2020     Spontaneous pneumothorax 10/2010    Right side     Past Surgical History:   Procedure Laterality Date     APPENDECTOMY OPEN N/A      COLONOSCOPY  2017     HERNIA REPAIR Left      KNEE SURGERY Left 2003    ACL repair and partioal lateral manisectomy     LUNG SURGERY       ORTHOPEDIC SURGERY  2003    ACL repair left     IN HAND/FINGER SURGERY UNLISTED       THORACOSCOPY Right 10/19/2010    for pneumothorax. upper lobe wedge resection     Current Outpatient Medications   Medication Sig Dispense Refill     diclofenac (VOLTAREN) 75 MG EC tablet Take 1 tablet (75 mg) by mouth 2 times daily 60 tablet 0     FLUoxetine (PROZAC) 20 MG capsule Take 1 capsule (20 mg) by mouth daily 90 capsule 3     simvastatin (ZOCOR) 10 MG tablet Take 1  tablet (10 mg) by mouth At Bedtime 90 tablet 3     sodium hyaluronate (SUPARTZ) 25 MG/2.5ML injection 2.5 mLs (25 mg) by INTRA-ARTICULAR route once a week (Patient not taking: Reported on 1/27/2021) 3 Syringe 0       No Known Allergies     Social History     Tobacco Use     Smoking status: Never Smoker     Smokeless tobacco: Never Used     Tobacco comment: na   Substance Use Topics     Alcohol use: Yes     Frequency: Monthly or less     Family History   Problem Relation Age of Onset     Hyperlipidemia Mother      Multiple myeloma Father 60     Other Cancer Father         multiple myloma     Prostate Cancer Brother      Brain Cancer Paternal Grandfather 60     Depression Daughter      History   Drug Use Unknown         Objective     There were no vitals taken for this visit.    Physical Exam    GENERAL APPEARANCE: healthy, alert and no distress     EYES: EOMI,  PERRL     HENT: ear canals and TM's normal and nose and mouth without ulcers or lesions     NECK: no adenopathy, no asymmetry, masses, or scars and thyroid normal to palpation     RESP: lungs clear to auscultation - no rales, rhonchi or wheezes     CV: regular rates and rhythm, normal S1 S2, no S3 or S4 and no murmur, click or rub     ABDOMEN:  soft, nontender, no HSM or masses and bowel sounds normal     MS: extremities normal- no gross deformities noted, no evidence of inflammation in joints, FROM in all extremities.     SKIN: no suspicious lesions or rashes     NEURO: Normal strength and tone, sensory exam grossly normal, mentation intact and speech normal     PSYCH: mentation appears normal. and affect normal/bright     LYMPHATICS: No cervical adenopathy    Recent Labs   Lab Test 10/20/20  0904 03/24/20  0914   HGB 14.1 14.4    241    140   POTASSIUM 3.9 3.9   CR 0.90 1.08   A1C 5.6  --         Diagnostics:         Revised Cardiac Risk Index (RCRI):  The patient has the following serious cardiovascular risks for perioperative complications:    - No serious cardiac risks = 0 points     RCRI Interpretation: 0 points: Class I (very low risk - 0.4% complication rate)             Signed Electronically by: Pal Wilks MD  Copy of this evaluation report is provided to requesting physician.    [facility] Preop Guidelines    Revised Cardiac Risk Index

## 2021-03-04 NOTE — PATIENT INSTRUCTIONS

## 2021-03-09 ENCOUNTER — TELEPHONE (OUTPATIENT)
Dept: FAMILY MEDICINE | Facility: CLINIC | Age: 56
End: 2021-03-09

## 2021-03-09 DIAGNOSIS — Z11.59 ENCOUNTER FOR SCREENING FOR OTHER VIRAL DISEASES: ICD-10-CM

## 2021-03-09 DIAGNOSIS — D70.8 OTHER NEUTROPENIA (H): ICD-10-CM

## 2021-03-09 PROBLEM — M17.12 OSTEOARTHRITIS OF LEFT KNEE, UNSPECIFIED OSTEOARTHRITIS TYPE: Status: ACTIVE | Noted: 2021-01-28

## 2021-03-09 LAB
BASOPHILS # BLD AUTO: 0 10E9/L (ref 0–0.2)
BASOPHILS NFR BLD AUTO: 0.7 %
DIFFERENTIAL METHOD BLD: ABNORMAL
EOSINOPHIL # BLD AUTO: 0.1 10E9/L (ref 0–0.7)
EOSINOPHIL NFR BLD AUTO: 1.7 %
ERYTHROCYTE [DISTWIDTH] IN BLOOD BY AUTOMATED COUNT: 12.5 % (ref 10–15)
HCT VFR BLD AUTO: 43.1 % (ref 40–53)
HGB BLD-MCNC: 14.3 G/DL (ref 13.3–17.7)
IMM GRANULOCYTES # BLD: 0 10E9/L (ref 0–0.4)
IMM GRANULOCYTES NFR BLD: 0.3 %
LABORATORY COMMENT REPORT: NORMAL
LYMPHOCYTES # BLD AUTO: 1.4 10E9/L (ref 0.8–5.3)
LYMPHOCYTES NFR BLD AUTO: 46.1 %
MCH RBC QN AUTO: 29.5 PG (ref 26.5–33)
MCHC RBC AUTO-ENTMCNC: 33.2 G/DL (ref 31.5–36.5)
MCV RBC AUTO: 89 FL (ref 78–100)
MONOCYTES # BLD AUTO: 0.2 10E9/L (ref 0–1.3)
MONOCYTES NFR BLD AUTO: 7.8 %
NEUTROPHILS # BLD AUTO: 1.3 10E9/L (ref 1.6–8.3)
NEUTROPHILS NFR BLD AUTO: 43.4 %
PLATELET # BLD AUTO: 234 10E9/L (ref 150–450)
RBC # BLD AUTO: 4.84 10E12/L (ref 4.4–5.9)
SARS-COV-2 RNA RESP QL NAA+PROBE: NEGATIVE
SARS-COV-2 RNA RESP QL NAA+PROBE: NORMAL
SPECIMEN SOURCE: NORMAL
SPECIMEN SOURCE: NORMAL
WBC # BLD AUTO: 3 10E9/L (ref 4–11)

## 2021-03-09 PROCEDURE — U0003 INFECTIOUS AGENT DETECTION BY NUCLEIC ACID (DNA OR RNA); SEVERE ACUTE RESPIRATORY SYNDROME CORONAVIRUS 2 (SARS-COV-2) (CORONAVIRUS DISEASE [COVID-19]), AMPLIFIED PROBE TECHNIQUE, MAKING USE OF HIGH THROUGHPUT TECHNOLOGIES AS DESCRIBED BY CMS-2020-01-R: HCPCS | Performed by: STUDENT IN AN ORGANIZED HEALTH CARE EDUCATION/TRAINING PROGRAM

## 2021-03-09 PROCEDURE — 36415 COLL VENOUS BLD VENIPUNCTURE: CPT | Performed by: INTERNAL MEDICINE

## 2021-03-09 PROCEDURE — U0005 INFEC AGEN DETEC AMPLI PROBE: HCPCS | Performed by: STUDENT IN AN ORGANIZED HEALTH CARE EDUCATION/TRAINING PROGRAM

## 2021-03-09 PROCEDURE — 85025 COMPLETE CBC W/AUTO DIFF WBC: CPT | Performed by: INTERNAL MEDICINE

## 2021-03-09 NOTE — TELEPHONE ENCOUNTER
Called pt and left vm to call scheduling to get labs at the [facility] scheduled for march 11th orders from .

## 2021-03-10 ENCOUNTER — PRE VISIT (OUTPATIENT)
Dept: ONCOLOGY | Facility: CLINIC | Age: 56
End: 2021-03-10

## 2021-03-10 ENCOUNTER — TELEPHONE (OUTPATIENT)
Dept: ONCOLOGY | Facility: CLINIC | Age: 56
End: 2021-03-10

## 2021-03-10 NOTE — TELEPHONE ENCOUNTER
ONCOLOGY INTAKE: Records Information      APPT INFORMATION:  Referring provider:  Dr. Wilks  Referring provider s clinic:  [facility]  Reason for visit/diagnosis:  neutropenia  Has patient been notified of appointment date and time?: yes    RECORDS INFORMATION:  Were the records received with the referral (via [employer])? no    Has patient been seen for any external appt for this diagnosis? no    If yes, where? n/a    Has patient had any imaging or procedures outside of [place] for this condition? no      If Yes, where? n/a    ADDITIONAL INFORMATION:  none

## 2021-03-11 ENCOUNTER — TRANSCRIBE ORDERS (OUTPATIENT)
Dept: OTHER | Age: 56
End: 2021-03-11

## 2021-03-11 DIAGNOSIS — N28.9 ACUTE RENAL INSUFFICIENCY: ICD-10-CM

## 2021-03-11 DIAGNOSIS — R07.89 ATYPICAL CHEST PAIN: ICD-10-CM

## 2021-03-11 DIAGNOSIS — R00.2 PALPITATIONS: Primary | ICD-10-CM

## 2021-03-11 LAB
ANION GAP SERPL CALCULATED.3IONS-SCNC: 2 MMOL/L (ref 3–14)
BUN SERPL-MCNC: 18 MG/DL (ref 7–30)
CALCIUM SERPL-MCNC: 9.2 MG/DL (ref 8.5–10.1)
CHLORIDE SERPL-SCNC: 103 MMOL/L (ref 94–109)
CO2 SERPL-SCNC: 34 MMOL/L (ref 20–32)
CREAT SERPL-MCNC: 0.97 MG/DL (ref 0.66–1.25)
GFR SERPL CREATININE-BSD FRML MDRD: 87 ML/MIN/{1.73_M2}
GLUCOSE SERPL-MCNC: 57 MG/DL (ref 70–99)
POTASSIUM SERPL-SCNC: 3.9 MMOL/L (ref 3.4–5.3)
SODIUM SERPL-SCNC: 139 MMOL/L (ref 133–144)

## 2021-03-11 PROCEDURE — 80048 BASIC METABOLIC PNL TOTAL CA: CPT | Performed by: INTERNAL MEDICINE

## 2021-03-11 PROCEDURE — 36415 COLL VENOUS BLD VENIPUNCTURE: CPT | Performed by: INTERNAL MEDICINE

## 2021-03-12 ENCOUNTER — APPOINTMENT (OUTPATIENT)
Dept: PHYSICAL THERAPY | Facility: CLINIC | Age: 56
End: 2021-03-12
Attending: STUDENT IN AN ORGANIZED HEALTH CARE EDUCATION/TRAINING PROGRAM
Payer: COMMERCIAL

## 2021-03-12 ENCOUNTER — APPOINTMENT (OUTPATIENT)
Dept: GENERAL RADIOLOGY | Facility: CLINIC | Age: 56
End: 2021-03-12
Attending: STUDENT IN AN ORGANIZED HEALTH CARE EDUCATION/TRAINING PROGRAM
Payer: COMMERCIAL

## 2021-03-12 ENCOUNTER — HOSPITAL ENCOUNTER (OUTPATIENT)
Facility: CLINIC | Age: 56
Discharge: HOME | End: 2021-03-12
Attending: STUDENT IN AN ORGANIZED HEALTH CARE EDUCATION/TRAINING PROGRAM | Admitting: STUDENT IN AN ORGANIZED HEALTH CARE EDUCATION/TRAINING PROGRAM
Payer: COMMERCIAL

## 2021-03-12 ENCOUNTER — ANCILLARY PROCEDURE (OUTPATIENT)
Dept: ULTRASOUND IMAGING | Facility: CLINIC | Age: 56
End: 2021-03-12
Attending: ANESTHESIOLOGY
Payer: COMMERCIAL

## 2021-03-12 ENCOUNTER — ANESTHESIA EVENT (OUTPATIENT)
Dept: SURGERY | Facility: CLINIC | Age: 56
End: 2021-03-12
Payer: COMMERCIAL

## 2021-03-12 ENCOUNTER — ANESTHESIA (OUTPATIENT)
Dept: SURGERY | Facility: CLINIC | Age: 56
End: 2021-03-12
Payer: COMMERCIAL

## 2021-03-12 VITALS
BODY MASS INDEX: 23.71 KG/M2 | HEIGHT: 69 IN | HEART RATE: 67 BPM | TEMPERATURE: 97.4 F | DIASTOLIC BLOOD PRESSURE: 51 MMHG | RESPIRATION RATE: 10 BRPM | SYSTOLIC BLOOD PRESSURE: 106 MMHG | WEIGHT: 160.05 LBS | OXYGEN SATURATION: 96 %

## 2021-03-12 DIAGNOSIS — M17.12 PRIMARY OSTEOARTHRITIS OF LEFT KNEE: Primary | ICD-10-CM

## 2021-03-12 DIAGNOSIS — M17.12 OSTEOARTHRITIS OF LEFT KNEE, UNSPECIFIED OSTEOARTHRITIS TYPE: ICD-10-CM

## 2021-03-12 LAB
ABO + RH BLD: NORMAL
ABO + RH BLD: NORMAL
BLD GP AB SCN SERPL QL: NORMAL
BLOOD BANK CMNT PATIENT-IMP: NORMAL
GLUCOSE BLDC GLUCOMTR-MCNC: 102 MG/DL (ref 70–99)
SPECIMEN EXP DATE BLD: NORMAL

## 2021-03-12 PROCEDURE — 73560 X-RAY EXAM OF KNEE 1 OR 2: CPT | Mod: 26 | Performed by: RADIOLOGY

## 2021-03-12 PROCEDURE — 710N000010 HC RECOVERY PHASE 1, LEVEL 2, PER MIN: Performed by: STUDENT IN AN ORGANIZED HEALTH CARE EDUCATION/TRAINING PROGRAM

## 2021-03-12 PROCEDURE — 250N000009 HC RX 250: Performed by: ANESTHESIOLOGY

## 2021-03-12 PROCEDURE — 250N000011 HC RX IP 250 OP 636: Performed by: STUDENT IN AN ORGANIZED HEALTH CARE EDUCATION/TRAINING PROGRAM

## 2021-03-12 PROCEDURE — 99203 OFFICE O/P NEW LOW 30 MIN: CPT | Performed by: PHYSICIAN ASSISTANT

## 2021-03-12 PROCEDURE — 250N000013 HC RX MED GY IP 250 OP 250 PS 637: Performed by: PHYSICIAN ASSISTANT

## 2021-03-12 PROCEDURE — 36415 COLL VENOUS BLD VENIPUNCTURE: CPT | Performed by: ANESTHESIOLOGY

## 2021-03-12 PROCEDURE — 258N000001 HC RX 258: Performed by: STUDENT IN AN ORGANIZED HEALTH CARE EDUCATION/TRAINING PROGRAM

## 2021-03-12 PROCEDURE — 86850 RBC ANTIBODY SCREEN: CPT | Performed by: ANESTHESIOLOGY

## 2021-03-12 PROCEDURE — 370N000017 HC ANESTHESIA TECHNICAL FEE, PER MIN: Performed by: STUDENT IN AN ORGANIZED HEALTH CARE EDUCATION/TRAINING PROGRAM

## 2021-03-12 PROCEDURE — 97530 THERAPEUTIC ACTIVITIES: CPT | Mod: GP | Performed by: PHYSICAL THERAPIST

## 2021-03-12 PROCEDURE — C1776 JOINT DEVICE (IMPLANTABLE): HCPCS | Performed by: STUDENT IN AN ORGANIZED HEALTH CARE EDUCATION/TRAINING PROGRAM

## 2021-03-12 PROCEDURE — 999N000141 HC STATISTIC PRE-PROCEDURE NURSING ASSESSMENT: Performed by: STUDENT IN AN ORGANIZED HEALTH CARE EDUCATION/TRAINING PROGRAM

## 2021-03-12 PROCEDURE — 258N000003 HC RX IP 258 OP 636: Performed by: NURSE ANESTHETIST, CERTIFIED REGISTERED

## 2021-03-12 PROCEDURE — 250N000011 HC RX IP 250 OP 636: Performed by: ANESTHESIOLOGY

## 2021-03-12 PROCEDURE — C1713 ANCHOR/SCREW BN/BN,TIS/BN: HCPCS | Performed by: STUDENT IN AN ORGANIZED HEALTH CARE EDUCATION/TRAINING PROGRAM

## 2021-03-12 PROCEDURE — 360N000077 HC SURGERY LEVEL 4, PER MIN: Performed by: STUDENT IN AN ORGANIZED HEALTH CARE EDUCATION/TRAINING PROGRAM

## 2021-03-12 PROCEDURE — 250N000009 HC RX 250: Performed by: STUDENT IN AN ORGANIZED HEALTH CARE EDUCATION/TRAINING PROGRAM

## 2021-03-12 PROCEDURE — 86900 BLOOD TYPING SEROLOGIC ABO: CPT | Performed by: ANESTHESIOLOGY

## 2021-03-12 PROCEDURE — 250N000011 HC RX IP 250 OP 636: Performed by: PHYSICIAN ASSISTANT

## 2021-03-12 PROCEDURE — 999N000065 XR KNEE PORT LT 1/2 VW: Mod: LT

## 2021-03-12 PROCEDURE — 250N000011 HC RX IP 250 OP 636: Performed by: NURSE ANESTHETIST, CERTIFIED REGISTERED

## 2021-03-12 PROCEDURE — 272N000001 HC OR GENERAL SUPPLY STERILE: Performed by: STUDENT IN AN ORGANIZED HEALTH CARE EDUCATION/TRAINING PROGRAM

## 2021-03-12 PROCEDURE — 258N000003 HC RX IP 258 OP 636: Performed by: STUDENT IN AN ORGANIZED HEALTH CARE EDUCATION/TRAINING PROGRAM

## 2021-03-12 PROCEDURE — 250N000009 HC RX 250: Performed by: NURSE ANESTHETIST, CERTIFIED REGISTERED

## 2021-03-12 PROCEDURE — 82962 GLUCOSE BLOOD TEST: CPT

## 2021-03-12 PROCEDURE — 250N000013 HC RX MED GY IP 250 OP 250 PS 637: Performed by: STUDENT IN AN ORGANIZED HEALTH CARE EDUCATION/TRAINING PROGRAM

## 2021-03-12 PROCEDURE — 99207 PR CDG-CODE CATEGORY CHANGED: CPT | Performed by: PHYSICIAN ASSISTANT

## 2021-03-12 PROCEDURE — 97116 GAIT TRAINING THERAPY: CPT | Mod: GP | Performed by: PHYSICAL THERAPIST

## 2021-03-12 PROCEDURE — 86901 BLOOD TYPING SEROLOGIC RH(D): CPT | Performed by: ANESTHESIOLOGY

## 2021-03-12 PROCEDURE — 97161 PT EVAL LOW COMPLEX 20 MIN: CPT | Mod: GP | Performed by: PHYSICAL THERAPIST

## 2021-03-12 PROCEDURE — 258N000003 HC RX IP 258 OP 636: Performed by: ANESTHESIOLOGY

## 2021-03-12 PROCEDURE — 278N000051 HC OR IMPLANT GENERAL: Performed by: STUDENT IN AN ORGANIZED HEALTH CARE EDUCATION/TRAINING PROGRAM

## 2021-03-12 DEVICE — IMPLANTABLE DEVICE
Type: IMPLANTABLE DEVICE | Site: KNEE | Status: FUNCTIONAL
Brand: PERSONA® NATURAL TIBIA®

## 2021-03-12 DEVICE — IMPLANTABLE DEVICE
Type: IMPLANTABLE DEVICE | Site: KNEE | Status: FUNCTIONAL
Brand: PERSONA™

## 2021-03-12 DEVICE — IMPLANTABLE DEVICE
Type: IMPLANTABLE DEVICE | Site: KNEE | Status: FUNCTIONAL
Brand: PERSONA® VIVACIT-E®

## 2021-03-12 DEVICE — BONE CEMENT SIMPLEX FULL DOSE 6191-1-001: Type: IMPLANTABLE DEVICE | Site: KNEE | Status: FUNCTIONAL

## 2021-03-12 RX ORDER — CEFAZOLIN SODIUM 1 G/3ML
1 INJECTION, POWDER, FOR SOLUTION INTRAMUSCULAR; INTRAVENOUS EVERY 8 HOURS
Status: DISCONTINUED | OUTPATIENT
Start: 2021-03-12 | End: 2021-03-12

## 2021-03-12 RX ORDER — ONDANSETRON 2 MG/ML
INJECTION INTRAMUSCULAR; INTRAVENOUS PRN
Status: DISCONTINUED | OUTPATIENT
Start: 2021-03-12 | End: 2021-03-12

## 2021-03-12 RX ORDER — LIDOCAINE 40 MG/G
CREAM TOPICAL
Status: DISCONTINUED | OUTPATIENT
Start: 2021-03-12 | End: 2021-03-12

## 2021-03-12 RX ORDER — ACETAMINOPHEN 325 MG/1
650 TABLET ORAL EVERY 4 HOURS PRN
Status: DISCONTINUED | OUTPATIENT
Start: 2021-03-15 | End: 2021-03-12 | Stop reason: HOSPADM

## 2021-03-12 RX ORDER — DOCUSATE SODIUM 100 MG/1
100 CAPSULE, LIQUID FILLED ORAL 2 TIMES DAILY
Status: DISCONTINUED | OUTPATIENT
Start: 2021-03-12 | End: 2021-03-12

## 2021-03-12 RX ORDER — NALOXONE HYDROCHLORIDE 0.4 MG/ML
0.4 INJECTION, SOLUTION INTRAMUSCULAR; INTRAVENOUS; SUBCUTANEOUS
Status: DISCONTINUED | OUTPATIENT
Start: 2021-03-12 | End: 2021-03-12 | Stop reason: HOSPADM

## 2021-03-12 RX ORDER — POLYETHYLENE GLYCOL 3350 17 G/17G
1 POWDER, FOR SOLUTION ORAL DAILY
Qty: 7 PACKET | Refills: 0 | Status: SHIPPED | OUTPATIENT
Start: 2021-03-12 | End: 2022-02-10

## 2021-03-12 RX ORDER — HYDROMORPHONE HYDROCHLORIDE 1 MG/ML
0.2 INJECTION, SOLUTION INTRAMUSCULAR; INTRAVENOUS; SUBCUTANEOUS
Status: DISCONTINUED | OUTPATIENT
Start: 2021-03-12 | End: 2021-03-12

## 2021-03-12 RX ORDER — OXYCODONE HYDROCHLORIDE 5 MG/1
5 TABLET ORAL EVERY 4 HOURS PRN
Status: DISCONTINUED | OUTPATIENT
Start: 2021-03-12 | End: 2021-03-12

## 2021-03-12 RX ORDER — NALOXONE HYDROCHLORIDE 0.4 MG/ML
0.2 INJECTION, SOLUTION INTRAMUSCULAR; INTRAVENOUS; SUBCUTANEOUS
Status: DISCONTINUED | OUTPATIENT
Start: 2021-03-12 | End: 2021-03-12 | Stop reason: HOSPADM

## 2021-03-12 RX ORDER — ONDANSETRON 4 MG/1
4 TABLET, ORALLY DISINTEGRATING ORAL EVERY 6 HOURS PRN
Status: DISCONTINUED | OUTPATIENT
Start: 2021-03-12 | End: 2021-03-12

## 2021-03-12 RX ORDER — CEFAZOLIN SODIUM 2 G/100ML
2 INJECTION, SOLUTION INTRAVENOUS
Status: COMPLETED | OUTPATIENT
Start: 2021-03-12 | End: 2021-03-12

## 2021-03-12 RX ORDER — HYDROMORPHONE HYDROCHLORIDE 1 MG/ML
0.2 INJECTION, SOLUTION INTRAMUSCULAR; INTRAVENOUS; SUBCUTANEOUS
Status: DISCONTINUED | OUTPATIENT
Start: 2021-03-12 | End: 2021-03-12 | Stop reason: HOSPADM

## 2021-03-12 RX ORDER — PROCHLORPERAZINE MALEATE 10 MG
10 TABLET ORAL EVERY 6 HOURS PRN
Status: DISCONTINUED | OUTPATIENT
Start: 2021-03-12 | End: 2021-03-12

## 2021-03-12 RX ORDER — IBUPROFEN 200 MG
600-800 TABLET ORAL EVERY 8 HOURS PRN
COMMUNITY
End: 2022-02-10

## 2021-03-12 RX ORDER — AMOXICILLIN 250 MG
1-2 CAPSULE ORAL 2 TIMES DAILY
Qty: 30 TABLET | Refills: 0 | Status: SHIPPED | OUTPATIENT
Start: 2021-03-12 | End: 2022-02-10

## 2021-03-12 RX ORDER — ONDANSETRON 2 MG/ML
4 INJECTION INTRAMUSCULAR; INTRAVENOUS EVERY 6 HOURS PRN
Status: DISCONTINUED | OUTPATIENT
Start: 2021-03-12 | End: 2021-03-12 | Stop reason: HOSPADM

## 2021-03-12 RX ORDER — FENTANYL CITRATE 50 UG/ML
25-50 INJECTION, SOLUTION INTRAMUSCULAR; INTRAVENOUS
Status: DISCONTINUED | OUTPATIENT
Start: 2021-03-12 | End: 2021-03-12

## 2021-03-12 RX ORDER — SIMVASTATIN 10 MG
10 TABLET ORAL AT BEDTIME
Status: DISCONTINUED | OUTPATIENT
Start: 2021-03-12 | End: 2021-03-12 | Stop reason: HOSPADM

## 2021-03-12 RX ORDER — BUPIVACAINE HYDROCHLORIDE 2.5 MG/ML
INJECTION, SOLUTION EPIDURAL; INFILTRATION; INTRACAUDAL PRN
Status: DISCONTINUED | OUTPATIENT
Start: 2021-03-12 | End: 2021-03-12

## 2021-03-12 RX ORDER — HYDROMORPHONE HYDROCHLORIDE 1 MG/ML
0.4 INJECTION, SOLUTION INTRAMUSCULAR; INTRAVENOUS; SUBCUTANEOUS
Status: DISCONTINUED | OUTPATIENT
Start: 2021-03-12 | End: 2021-03-12

## 2021-03-12 RX ORDER — AMOXICILLIN 250 MG
1 CAPSULE ORAL 2 TIMES DAILY
Status: DISCONTINUED | OUTPATIENT
Start: 2021-03-12 | End: 2021-03-12 | Stop reason: HOSPADM

## 2021-03-12 RX ORDER — ONDANSETRON 4 MG/1
4 TABLET, FILM COATED ORAL EVERY 6 HOURS PRN
Status: DISCONTINUED | OUTPATIENT
Start: 2021-03-12 | End: 2021-03-12 | Stop reason: HOSPADM

## 2021-03-12 RX ORDER — KETOROLAC TROMETHAMINE 30 MG/ML
15 INJECTION, SOLUTION INTRAMUSCULAR; INTRAVENOUS EVERY 6 HOURS
Status: DISCONTINUED | OUTPATIENT
Start: 2021-03-12 | End: 2021-03-12 | Stop reason: HOSPADM

## 2021-03-12 RX ORDER — METHOCARBAMOL 750 MG/1
750 TABLET, FILM COATED ORAL EVERY 6 HOURS PRN
Status: DISCONTINUED | OUTPATIENT
Start: 2021-03-12 | End: 2021-03-12 | Stop reason: HOSPADM

## 2021-03-12 RX ORDER — METHOCARBAMOL 750 MG/1
750 TABLET, FILM COATED ORAL EVERY 6 HOURS PRN
Status: DISCONTINUED | OUTPATIENT
Start: 2021-03-12 | End: 2021-03-12

## 2021-03-12 RX ORDER — BISACODYL 10 MG
10 SUPPOSITORY, RECTAL RECTAL DAILY PRN
Status: DISCONTINUED | OUTPATIENT
Start: 2021-03-12 | End: 2021-03-12 | Stop reason: HOSPADM

## 2021-03-12 RX ORDER — TRANEXAMIC ACID 650 MG/1
1950 TABLET ORAL ONCE
Status: COMPLETED | OUTPATIENT
Start: 2021-03-12 | End: 2021-03-12

## 2021-03-12 RX ORDER — ONDANSETRON 4 MG/1
4 TABLET, ORALLY DISINTEGRATING ORAL EVERY 6 HOURS PRN
Status: DISCONTINUED | OUTPATIENT
Start: 2021-03-12 | End: 2021-03-12 | Stop reason: HOSPADM

## 2021-03-12 RX ORDER — ASPIRIN 81 MG/1
81 TABLET ORAL 2 TIMES DAILY
Status: DISCONTINUED | OUTPATIENT
Start: 2021-03-12 | End: 2021-03-12

## 2021-03-12 RX ORDER — SODIUM CHLORIDE, SODIUM LACTATE, POTASSIUM CHLORIDE, CALCIUM CHLORIDE 600; 310; 30; 20 MG/100ML; MG/100ML; MG/100ML; MG/100ML
INJECTION, SOLUTION INTRAVENOUS CONTINUOUS PRN
Status: DISCONTINUED | OUTPATIENT
Start: 2021-03-12 | End: 2021-03-12

## 2021-03-12 RX ORDER — FENTANYL CITRATE 50 UG/ML
INJECTION, SOLUTION INTRAMUSCULAR; INTRAVENOUS PRN
Status: DISCONTINUED | OUTPATIENT
Start: 2021-03-12 | End: 2021-03-12

## 2021-03-12 RX ORDER — ASPIRIN 81 MG/1
81 TABLET ORAL 2 TIMES DAILY
Qty: 60 TABLET | Refills: 0 | Status: SHIPPED | OUTPATIENT
Start: 2021-03-12 | End: 2022-02-10

## 2021-03-12 RX ORDER — SODIUM CHLORIDE, SODIUM LACTATE, POTASSIUM CHLORIDE, CALCIUM CHLORIDE 600; 310; 30; 20 MG/100ML; MG/100ML; MG/100ML; MG/100ML
INJECTION, SOLUTION INTRAVENOUS CONTINUOUS
Status: DISCONTINUED | OUTPATIENT
Start: 2021-03-12 | End: 2021-03-12 | Stop reason: HOSPADM

## 2021-03-12 RX ORDER — ACETAMINOPHEN 325 MG/1
650 TABLET ORAL EVERY 4 HOURS PRN
Qty: 100 TABLET | Refills: 0 | Status: SHIPPED | OUTPATIENT
Start: 2021-03-12 | End: 2022-02-10

## 2021-03-12 RX ORDER — DEXAMETHASONE SODIUM PHOSPHATE 4 MG/ML
INJECTION, SOLUTION INTRA-ARTICULAR; INTRALESIONAL; INTRAMUSCULAR; INTRAVENOUS; SOFT TISSUE PRN
Status: DISCONTINUED | OUTPATIENT
Start: 2021-03-12 | End: 2021-03-12

## 2021-03-12 RX ORDER — DOCUSATE SODIUM 100 MG/1
100 CAPSULE, LIQUID FILLED ORAL 2 TIMES DAILY
Status: DISCONTINUED | OUTPATIENT
Start: 2021-03-12 | End: 2021-03-12 | Stop reason: HOSPADM

## 2021-03-12 RX ORDER — ACETAMINOPHEN 500 MG
500 TABLET ORAL EVERY 8 HOURS PRN
COMMUNITY
End: 2022-02-10

## 2021-03-12 RX ORDER — ONDANSETRON 2 MG/ML
4 INJECTION INTRAMUSCULAR; INTRAVENOUS EVERY 6 HOURS PRN
Status: DISCONTINUED | OUTPATIENT
Start: 2021-03-12 | End: 2021-03-12

## 2021-03-12 RX ORDER — BISACODYL 10 MG
10 SUPPOSITORY, RECTAL RECTAL DAILY PRN
Status: DISCONTINUED | OUTPATIENT
Start: 2021-03-12 | End: 2021-03-12

## 2021-03-12 RX ORDER — MAGNESIUM HYDROXIDE/ALUMINUM HYDROXICE/SIMETHICONE 120; 1200; 1200 MG/30ML; MG/30ML; MG/30ML
20 SUSPENSION ORAL EVERY 4 HOURS PRN
Status: DISCONTINUED | OUTPATIENT
Start: 2021-03-12 | End: 2021-03-12 | Stop reason: HOSPADM

## 2021-03-12 RX ORDER — PROPOFOL 10 MG/ML
INJECTION, EMULSION INTRAVENOUS CONTINUOUS PRN
Status: DISCONTINUED | OUTPATIENT
Start: 2021-03-12 | End: 2021-03-12

## 2021-03-12 RX ORDER — MAGNESIUM HYDROXIDE 1200 MG/15ML
LIQUID ORAL PRN
Status: DISCONTINUED | OUTPATIENT
Start: 2021-03-12 | End: 2021-03-12 | Stop reason: HOSPADM

## 2021-03-12 RX ORDER — OXYCODONE HYDROCHLORIDE 10 MG/1
10 TABLET ORAL EVERY 4 HOURS PRN
Status: DISCONTINUED | OUTPATIENT
Start: 2021-03-12 | End: 2021-03-12 | Stop reason: HOSPADM

## 2021-03-12 RX ORDER — CALCIUM CARBONATE 500 MG/1
500 TABLET, CHEWABLE ORAL 4 TIMES DAILY PRN
Status: DISCONTINUED | OUTPATIENT
Start: 2021-03-12 | End: 2021-03-12 | Stop reason: HOSPADM

## 2021-03-12 RX ORDER — OXYCODONE HYDROCHLORIDE 5 MG/1
5-10 TABLET ORAL
Qty: 30 TABLET | Refills: 0 | Status: SHIPPED | OUTPATIENT
Start: 2021-03-12 | End: 2021-03-17

## 2021-03-12 RX ORDER — ACETAMINOPHEN 325 MG/1
975 TABLET ORAL EVERY 8 HOURS
Status: DISCONTINUED | OUTPATIENT
Start: 2021-03-12 | End: 2021-03-12 | Stop reason: HOSPADM

## 2021-03-12 RX ORDER — CEFAZOLIN SODIUM 1 G/3ML
1 INJECTION, POWDER, FOR SOLUTION INTRAMUSCULAR; INTRAVENOUS EVERY 8 HOURS
Status: DISCONTINUED | OUTPATIENT
Start: 2021-03-12 | End: 2021-03-12 | Stop reason: HOSPADM

## 2021-03-12 RX ORDER — HYDROMORPHONE HYDROCHLORIDE 1 MG/ML
0.4 INJECTION, SOLUTION INTRAMUSCULAR; INTRAVENOUS; SUBCUTANEOUS
Status: DISCONTINUED | OUTPATIENT
Start: 2021-03-12 | End: 2021-03-12 | Stop reason: HOSPADM

## 2021-03-12 RX ORDER — EPHEDRINE SULFATE 50 MG/ML
INJECTION, SOLUTION INTRAMUSCULAR; INTRAVENOUS; SUBCUTANEOUS PRN
Status: DISCONTINUED | OUTPATIENT
Start: 2021-03-12 | End: 2021-03-12

## 2021-03-12 RX ORDER — ASPIRIN 81 MG/1
162 TABLET ORAL DAILY
Status: DISCONTINUED | OUTPATIENT
Start: 2021-03-13 | End: 2021-03-12 | Stop reason: HOSPADM

## 2021-03-12 RX ORDER — LIDOCAINE HYDROCHLORIDE 20 MG/ML
INJECTION, SOLUTION INFILTRATION; PERINEURAL PRN
Status: DISCONTINUED | OUTPATIENT
Start: 2021-03-12 | End: 2021-03-12

## 2021-03-12 RX ORDER — HYDROMORPHONE HYDROCHLORIDE 1 MG/ML
.3-.5 INJECTION, SOLUTION INTRAMUSCULAR; INTRAVENOUS; SUBCUTANEOUS EVERY 5 MIN PRN
Status: DISCONTINUED | OUTPATIENT
Start: 2021-03-12 | End: 2021-03-12

## 2021-03-12 RX ORDER — ONDANSETRON 2 MG/ML
4 INJECTION INTRAMUSCULAR; INTRAVENOUS EVERY 30 MIN PRN
Status: DISCONTINUED | OUTPATIENT
Start: 2021-03-12 | End: 2021-03-12

## 2021-03-12 RX ORDER — OXYCODONE HYDROCHLORIDE 5 MG/1
5 TABLET ORAL EVERY 4 HOURS PRN
Status: DISCONTINUED | OUTPATIENT
Start: 2021-03-12 | End: 2021-03-12 | Stop reason: HOSPADM

## 2021-03-12 RX ORDER — CEFAZOLIN SODIUM 1 G/3ML
1 INJECTION, POWDER, FOR SOLUTION INTRAMUSCULAR; INTRAVENOUS SEE ADMIN INSTRUCTIONS
Status: DISCONTINUED | OUTPATIENT
Start: 2021-03-12 | End: 2021-03-12 | Stop reason: HOSPADM

## 2021-03-12 RX ORDER — FLUMAZENIL 0.1 MG/ML
0.2 INJECTION, SOLUTION INTRAVENOUS
Status: DISCONTINUED | OUTPATIENT
Start: 2021-03-12 | End: 2021-03-12

## 2021-03-12 RX ORDER — POLYETHYLENE GLYCOL 3350 17 G/17G
17 POWDER, FOR SOLUTION ORAL DAILY
Status: DISCONTINUED | OUTPATIENT
Start: 2021-03-13 | End: 2021-03-12

## 2021-03-12 RX ORDER — PROCHLORPERAZINE MALEATE 10 MG
10 TABLET ORAL EVERY 6 HOURS PRN
Status: DISCONTINUED | OUTPATIENT
Start: 2021-03-12 | End: 2021-03-12 | Stop reason: HOSPADM

## 2021-03-12 RX ORDER — KETAMINE HYDROCHLORIDE 10 MG/ML
INJECTION INTRAMUSCULAR; INTRAVENOUS PRN
Status: DISCONTINUED | OUTPATIENT
Start: 2021-03-12 | End: 2021-03-12

## 2021-03-12 RX ORDER — IBUPROFEN 600 MG/1
600 TABLET, FILM COATED ORAL EVERY 6 HOURS PRN
Status: DISCONTINUED | OUTPATIENT
Start: 2021-03-12 | End: 2021-03-12 | Stop reason: HOSPADM

## 2021-03-12 RX ORDER — ONDANSETRON 4 MG/1
4 TABLET, ORALLY DISINTEGRATING ORAL EVERY 30 MIN PRN
Status: DISCONTINUED | OUTPATIENT
Start: 2021-03-12 | End: 2021-03-12

## 2021-03-12 RX ORDER — IBUPROFEN 600 MG/1
600 TABLET, FILM COATED ORAL EVERY 6 HOURS PRN
Qty: 30 TABLET | Refills: 0 | Status: SHIPPED | OUTPATIENT
Start: 2021-03-12 | End: 2022-02-10

## 2021-03-12 RX ORDER — DIPHENHYDRAMINE HCL 25 MG
25 CAPSULE ORAL EVERY 6 HOURS PRN
Status: DISCONTINUED | OUTPATIENT
Start: 2021-03-12 | End: 2021-03-12 | Stop reason: HOSPADM

## 2021-03-12 RX ORDER — ACETAMINOPHEN 325 MG/1
650 TABLET ORAL EVERY 4 HOURS PRN
Status: DISCONTINUED | OUTPATIENT
Start: 2021-03-15 | End: 2021-03-12

## 2021-03-12 RX ORDER — POLYETHYLENE GLYCOL 3350 17 G/17G
17 POWDER, FOR SOLUTION ORAL DAILY
Status: DISCONTINUED | OUTPATIENT
Start: 2021-03-13 | End: 2021-03-12 | Stop reason: HOSPADM

## 2021-03-12 RX ORDER — ACETAMINOPHEN 325 MG/1
975 TABLET ORAL EVERY 8 HOURS
Status: DISCONTINUED | OUTPATIENT
Start: 2021-03-12 | End: 2021-03-12

## 2021-03-12 RX ORDER — PROPOFOL 10 MG/ML
INJECTION, EMULSION INTRAVENOUS PRN
Status: DISCONTINUED | OUTPATIENT
Start: 2021-03-12 | End: 2021-03-12

## 2021-03-12 RX ORDER — DEXAMETHASONE SODIUM PHOSPHATE 10 MG/ML
INJECTION, SOLUTION INTRAMUSCULAR; INTRAVENOUS PRN
Status: DISCONTINUED | OUTPATIENT
Start: 2021-03-12 | End: 2021-03-12

## 2021-03-12 RX ORDER — OXYCODONE HYDROCHLORIDE 10 MG/1
10 TABLET ORAL EVERY 4 HOURS PRN
Status: DISCONTINUED | OUTPATIENT
Start: 2021-03-12 | End: 2021-03-12

## 2021-03-12 RX ORDER — LIDOCAINE 40 MG/G
CREAM TOPICAL
Status: DISCONTINUED | OUTPATIENT
Start: 2021-03-12 | End: 2021-03-12 | Stop reason: HOSPADM

## 2021-03-12 RX ORDER — AMOXICILLIN 250 MG
1 CAPSULE ORAL 2 TIMES DAILY
Status: DISCONTINUED | OUTPATIENT
Start: 2021-03-12 | End: 2021-03-12

## 2021-03-12 RX ORDER — BUPIVACAINE HYDROCHLORIDE 7.5 MG/ML
INJECTION, SOLUTION INTRASPINAL PRN
Status: DISCONTINUED | OUTPATIENT
Start: 2021-03-12 | End: 2021-03-12

## 2021-03-12 RX ADMIN — DEXAMETHASONE SODIUM PHOSPHATE 4 MG: 4 INJECTION, SOLUTION INTRAMUSCULAR; INTRAVENOUS at 08:15

## 2021-03-12 RX ADMIN — PROPOFOL 100 MCG/KG/MIN: 10 INJECTION, EMULSION INTRAVENOUS at 07:44

## 2021-03-12 RX ADMIN — FENTANYL CITRATE 50 MCG: 50 INJECTION, SOLUTION INTRAMUSCULAR; INTRAVENOUS at 07:22

## 2021-03-12 RX ADMIN — FENTANYL CITRATE 50 MCG: 50 INJECTION, SOLUTION INTRAMUSCULAR; INTRAVENOUS at 08:39

## 2021-03-12 RX ADMIN — Medication 5 MG: at 10:23

## 2021-03-12 RX ADMIN — Medication 5 MG: at 09:47

## 2021-03-12 RX ADMIN — MIDAZOLAM 1 MG: 1 INJECTION INTRAMUSCULAR; INTRAVENOUS at 07:23

## 2021-03-12 RX ADMIN — PROPOFOL 30 MG: 10 INJECTION, EMULSION INTRAVENOUS at 09:10

## 2021-03-12 RX ADMIN — PROPOFOL 30 MG: 10 INJECTION, EMULSION INTRAVENOUS at 08:39

## 2021-03-12 RX ADMIN — OXYCODONE HYDROCHLORIDE 5 MG: 5 TABLET ORAL at 13:37

## 2021-03-12 RX ADMIN — CEFAZOLIN 1 G: 10 INJECTION, POWDER, FOR SOLUTION INTRAVENOUS at 09:48

## 2021-03-12 RX ADMIN — PROPOFOL 50 MG: 10 INJECTION, EMULSION INTRAVENOUS at 07:44

## 2021-03-12 RX ADMIN — ACETAMINOPHEN 975 MG: 325 TABLET, FILM COATED ORAL at 13:37

## 2021-03-12 RX ADMIN — PROPOFOL 30 MG: 10 INJECTION, EMULSION INTRAVENOUS at 10:30

## 2021-03-12 RX ADMIN — TRANEXAMIC ACID 1950 MG: 650 TABLET ORAL at 06:03

## 2021-03-12 RX ADMIN — HYDROMORPHONE HYDROCHLORIDE 0.5 MG: 1 INJECTION, SOLUTION INTRAMUSCULAR; INTRAVENOUS; SUBCUTANEOUS at 10:48

## 2021-03-12 RX ADMIN — LIDOCAINE HYDROCHLORIDE 40 MG: 20 INJECTION, SOLUTION INFILTRATION; PERINEURAL at 07:44

## 2021-03-12 RX ADMIN — CEFAZOLIN 1 G: 1 INJECTION, POWDER, FOR SOLUTION INTRAMUSCULAR; INTRAVENOUS at 16:48

## 2021-03-12 RX ADMIN — KETOROLAC TROMETHAMINE 15 MG: 30 INJECTION, SOLUTION INTRAMUSCULAR; INTRAVENOUS at 16:42

## 2021-03-12 RX ADMIN — HYDROMORPHONE HYDROCHLORIDE 0.25 MG: 1 INJECTION, SOLUTION INTRAMUSCULAR; INTRAVENOUS; SUBCUTANEOUS at 10:34

## 2021-03-12 RX ADMIN — CEFAZOLIN 2 G: 10 INJECTION, POWDER, FOR SOLUTION INTRAVENOUS at 07:51

## 2021-03-12 RX ADMIN — SODIUM CHLORIDE, POTASSIUM CHLORIDE, SODIUM LACTATE AND CALCIUM CHLORIDE: 600; 310; 30; 20 INJECTION, SOLUTION INTRAVENOUS at 10:23

## 2021-03-12 RX ADMIN — Medication 10 MG: at 07:51

## 2021-03-12 RX ADMIN — FENTANYL CITRATE 25 MCG: 50 INJECTION, SOLUTION INTRAMUSCULAR; INTRAVENOUS at 09:39

## 2021-03-12 RX ADMIN — DEXMEDETOMIDINE HYDROCHLORIDE 40 MCG: 100 INJECTION, SOLUTION INTRAVENOUS at 07:10

## 2021-03-12 RX ADMIN — SODIUM CHLORIDE, POTASSIUM CHLORIDE, SODIUM LACTATE AND CALCIUM CHLORIDE: 600; 310; 30; 20 INJECTION, SOLUTION INTRAVENOUS at 07:31

## 2021-03-12 RX ADMIN — FENTANYL CITRATE 50 MCG: 50 INJECTION, SOLUTION INTRAMUSCULAR; INTRAVENOUS at 11:03

## 2021-03-12 RX ADMIN — BUPIVACAINE HYDROCHLORIDE IN DEXTROSE 1.2 ML: 7.5 INJECTION, SOLUTION SUBARACHNOID at 07:40

## 2021-03-12 RX ADMIN — ONDANSETRON 4 MG: 2 INJECTION INTRAMUSCULAR; INTRAVENOUS at 10:23

## 2021-03-12 RX ADMIN — HYDROMORPHONE HYDROCHLORIDE 0.25 MG: 1 INJECTION, SOLUTION INTRAMUSCULAR; INTRAVENOUS; SUBCUTANEOUS at 10:30

## 2021-03-12 RX ADMIN — FENTANYL CITRATE 50 MCG: 50 INJECTION, SOLUTION INTRAMUSCULAR; INTRAVENOUS at 11:23

## 2021-03-12 RX ADMIN — HYDROMORPHONE HYDROCHLORIDE 0.5 MG: 1 INJECTION, SOLUTION INTRAMUSCULAR; INTRAVENOUS; SUBCUTANEOUS at 11:50

## 2021-03-12 RX ADMIN — PROPOFOL 30 MG: 10 INJECTION, EMULSION INTRAVENOUS at 10:23

## 2021-03-12 RX ADMIN — OXYCODONE HYDROCHLORIDE 5 MG: 5 TABLET ORAL at 18:15

## 2021-03-12 RX ADMIN — PROPOFOL 30 MG: 10 INJECTION, EMULSION INTRAVENOUS at 09:42

## 2021-03-12 RX ADMIN — FENTANYL CITRATE 25 MCG: 50 INJECTION, SOLUTION INTRAMUSCULAR; INTRAVENOUS at 09:42

## 2021-03-12 RX ADMIN — DEXAMETHASONE SODIUM PHOSPHATE 2 MG: 10 INJECTION, SOLUTION INTRAMUSCULAR; INTRAVENOUS at 07:10

## 2021-03-12 RX ADMIN — BUPIVACAINE HYDROCHLORIDE 20 ML: 2.5 INJECTION, SOLUTION EPIDURAL; INFILTRATION; INTRACAUDAL at 07:10

## 2021-03-12 ASSESSMENT — MIFFLIN-ST. JEOR: SCORE: 1547.25

## 2021-03-12 NOTE — ANESTHESIA POSTPROCEDURE EVALUATION
Patient: Lasha Sims    Procedure(s):  ARTHROSCOPY, KNEE LEFT  ARTHROPLASTY, KNEE, TOTAL LEFT    Diagnosis:Osteoarthritis of left knee, unspecified osteoarthritis type [M17.12]  Diagnosis Additional Information: No value filed.    Anesthesia Type:  Spinal, MAC    Note:  Disposition: Admission   Postop Pain Control: Uneventful            Sign Out: Well controlled pain   PONV: No   Neuro/Psych: Uneventful            Sign Out: Acceptable/Baseline neuro status   Airway/Respiratory: Uneventful            Sign Out: Acceptable/Baseline resp. status   CV/Hemodynamics: Uneventful            Sign Out: Acceptable CV status   Other NRE:    DID A NON-ROUTINE EVENT OCCUR?          Last vitals:  Vitals:    03/12/21 1215 03/12/21 1225 03/12/21 1250   BP: 107/66 107/60 113/49   Pulse: 78 74 65   Resp: 9 13 16   Temp:   36.3  C (97.4  F)   SpO2: 99% 99% 97%       Last vitals prior to Anesthesia Care Transfer:  CRNA VITALS  3/12/2021 1018 - 3/12/2021 1118      3/12/2021             Pulse:  79    Temp:  36.8  C (98.2  F)    SpO2:  100 %          Electronically Signed By: Juan Luis Bender MD  March 12, 2021  1:26 PM

## 2021-03-12 NOTE — OP NOTE
[facility]    Operative Note    Pre-operative diagnosis: 55-year-old male status post ACL reconstruction and varus alignment with pain of left knee most likely due to medial compartmental osteoarthritic changes. Furthermore, there is the presence of a potential lateral meniscal cyst or ganglion cyst and the presence of some early osteoarthritic change in the patellofemoral and lateral compartment.  Post-operative diagnosis: 55-year-old male status post ACL reconstruction and varus alignment with pain of left knee most likely due to a combination of end-stage osteoarthritic changes of medial and patellofemoral compartment.    Procedure(s):  1. ARTHROSCOPY, KNEE LEFT  2. ARTHROPLASTY, KNEE, TOTAL LEFT  Surgeon: Surgeon(s) and Role:     * Ton Jackson MD - Primary   *Reji Enriquez MD-resident  Anesthesia: Spinal   Estimated blood loss: Less than 50 ml  Drains: None  Specimens: * No specimens in log *  Complications: None.  Implants:   Implant Name Type Inv. Item Serial No.  Lot No. LRB No. Used Action   BONE CEMENT SIMPLEX FULL DOSE 6191-1-001 Cement, Bone BONE CEMENT SIMPLEX FULL DOSE 6191-1-001  [employer] [id number] Left 2 Implanted   IMP TIBIAL ZIM PSN NP STM 5DEG SZ FL -007-01 Total Joint Component/Insert IMP TIBIAL ZIM PSN NP STM 5DEG SZ FL -358-01  [employer] [id number] Left 1 Implanted   IMP PATELLA ZIM KNEE ALL DARYN 32MM -276-32 Total Joint Component/Insert IMP PATELLA ZIM KNEE ALL DARYN 32MM -584-32  [employer] [id number] Left 1 Implanted   Zac Persona Femur cemented CR Standard Left Size 8 Total Joint Component/Insert   [employer] [id number] Left 1 Implanted   [employer] Persona Articular Surface MC Left 16mm ht  Total Joint Component/Insert   [employer] [id number] Left 1 Implanted     Components used:  1. Zac Persona Size  8 femoral Component  2. Zac Persona Size  F Tibial Component  3. Zac Size  32  Patellar Button    4. [facility] polyethylene Liner, Size  16     Description of procedure:         Patient was seen in the preoperative area where procedure, risks and complications, expectations and rehabilitation were discussed once more.  All questions were answered.  Patient understands and agrees to the treatment plan as set forth.  The initial part of the procedure would consist of a scope of left knee to identify whether patient would be a candidate for a medial unicondylar knee arthroplasty or would be better served by a total knee arthroplasty.  Informed consent was obtained and the left lower extremity was marked.  Patient was then taken to the operating room where general anesthesia was induced.        Patient was positioned supine with a bump under the ipsilateral buttock. Bony prominences were well padded and the patient was secured to the table in the standard fashion.  An SCD was placed on the nonoperative leg.  Preoperative range of motion showed a flexion/extension of 135-0-0.     A nonsterile tourniquet was applied and the left lower extremity was placed in the [place] knee support.  Now the left lower extremity was prepped and draped in usual sterile fashion.  After the completion of a timeout procedure the left lower extremity was exsanguinated using the Esmarch and the tourniquet was inflated.    A standard anterolateral portal was made using a #11 blade.  Using the trocar access to the knee joint was obtained.  The knee was then insufflated with saline and the knee was inspected.    Suprapatellar pouch: Some synovitis visible.  No signs of loose body.  No suprapatellar curtain.  Patellofemoral joint: Grade 4 chondromalacia of the trochlea.  Degenerative changes of the articular surface of the patella.  A medial plica is not visible.  Medial gutter: No signs of loose bodies.  Medial compartment: Grade IV chondromalacia of the medial compartment.  Degenerative medial meniscal  tear.  Intercondylar notch: Status post ACL repair.    Lateral compartment: Grade II chondromalacia of the lateral compartment with degenerative lateral meniscal tear.    After completion of the inspection of the left knee it was abundantly clear that patient would most likely not be best served by pursuing unicondylar knee arthroplasty.  It was therefore decided to end the arthroscopy and continue with placement of left total knee arthroplasty.    A standard midline incision was made. Dissection was carried down to the knee retinaculum and the quadriceps tendon. A standard medial parapatellar arthrotomy was performed. Subperiosteal dissection was carried out along the medial proximal tibia. The fat pad was removed.  Care was taken to protect the patellar tendon and extensor mechanism during fat pad removal. The tissue along the anterior aspect of the distal femur was also removed.  The patella was subluxed and the knee was flexed.       The ACL was released.  A drill was advanced down the femoral canal in a retrograde direction. The sword was set at 5 degrees of valgus in accordance with the preoperative plan and was advanced into the canal.  The distal femoral cutting block was then placed at +0 mm resection and pinned into place. The jennifer was removed and the distal femur cuts were made medially and laterally.  The cutting block was removed and the jennifer with the alignment piece was inserted into the canal to ensure proper angle of the cut and a flat cut.       Attention was then directed towards the proximal tibia. The meniscus and soft tissue was removed to expose the medial and lateral tibial plateau. Retractors were placed around the knee to obtain good visualization, then the extramedullary tibial alignment guide was placed in the appropriate position. The 2-10 guide was used to select the resection level of the tibia and the cutting guide was pinned into position. With care directed towards preserving the  posterior nerves and blood vessels and the medial collateral ligament, the saw was used to cut the proximal tibia.  Next, after resecting meniscal remnants both medially and laterally, the 10 mm sizing block was put in place to ensure proper alignment.  It showed adequate alignment using the drop jennifer as well as good balancing of soft tissues.     We turned our attention to the back to the distal femur.  The posterior referencing femoral sizing block was used and the femur was measured to be a size  8.  Two holes were drilled to obtain 3 degrees of external rotation with respect to the posterior condylar axis of the femur.  The femoral cutting block was then slid over the pins and fixed onto the bone.  The anterior cut, posterior cut, anterior and posterior chamfer cuts were made.  The block was then removed and excess bone fragments were removed.   Now we visualized the posterior knee. A curved osteotome was used to remove posterior osteophytes. Our anesthetic injection was then introduced through the posterior capsule with extreme care directed towards not injuring the posterior neurovascular structures. The trial components (femur size 8) were placed and confirmed appropriate sizing of the flexion and extension gaps.  It was clear that the knee was still tight in flexion and extension.  It was therefore decided to perform a recurrence of the tibia.  Once this was performed the trial components were placed again. The drop jennifer was used to confirm proper alignment of the cut.  A range of motion of 0 to 130 degrees with a well balanced knee in both flexion and extension was obtained. Excess bone was removed.  The lug holes were drilled.      The patella was then exposed.  Soft tissues were removed around the patella for visualization. The patella thickness was measured with a caliper to be 27.5, and a measured resection of 8.5 mm was made.  A caliper was then used to measure the residual thickness of the patella to be  19 mm.  The patella was measured with the lollipops and found to be a size 32. The drill guide was placed and the three lug holes were drilled. The patella trial was then placed and the knee was ranged. The patella was found to track well.      The femoral trial component was removed the proximal tibia was again exposed and sized. The tibia was measured to be a size  F.   The tibial trial was placed into proper rotation and pinned into place.  The stove pipe was used to guide the drill, and then the wing punch was used. The tibial trial was then removed.      The patellar and femoral trials were removed. Pulsitile lavage was used to clean the bone in preparation for cementing. The bone was dried. Cement was mixed, and then all the components were cemented into place. While the cement was hardening, the remainder of the anesthetic injection was spread around the deep retinacular layer and the subcutaneous layer with a spinal needle.   The knee was irrigated with betadine lavage for approximately 3 minutes.  Once the cement had hardened, the excess cement was removed.    The tourniquet was released.  Hemostasis was obtained.       The  16 mm liner fit best and the knee had full extension to 135 degrees and was stable to anterior and posterior stress in flexion and extension as well as varus/valgus stress in 0, 30, and 90 degrees of flexion.  The trial liner was removed and the real liner was placed.       The knee was again copiously irrigated.   Closure was performed with a #1 symmetric Vicryl interrupted sutures in the retinacular layer, 0 Vicryl and 2-0 Vicryl interrupted suture in the  subcutaneous tissues, and 3-0 monocryl in the skin.  A sterile dressing was applied.  The patient was then awakened, transferred to the [facility], and brought to the recovery room in stable condition. There were no complications.     POSTOPERATIVE PLAN:  Weight bearing: Weightbearing as tolerated  Anticoagulation: Aspirin 162 mg daily  for 4 weeks  Precautions: No precautions  Pain control and monitoring  PT/OT  XR in PACU  Discharge today or tomorrow when pain well controlled and ambulating safely.        Ton Jackson MD      Adult Reconstruction  [facility] Department of Orthopaedic Surgery  Pager [phone]

## 2021-03-12 NOTE — BRIEF OP NOTE
Orthopaedic Surgery Brief Op-Note      Patient: Lasha Sims  : 1965  Date of Service: 3/12/2021 10:55 AM    Pre-operative Diagnosis: Osteoarthritis of left knee, unspecified osteoarthritis type [M17.12]  Post-operative Diagnosis: same    Procedure(s) Performed: Procedure(s):  ARTHROSCOPY, KNEE LEFT  ARTHROPLASTY, KNEE, TOTAL LEFT    Staff: Dr. Jackson  Assistants: Reji Enriquez MD PGY-4    Anesthesia: General  EBL: 100 cc  Tourniquet Time: 120 minutes at 250 mmHg    Implants:   Implant Name Type Inv. Item Serial No.  Lot No. LRB No. Used Action   BONE CEMENT SIMPLEX FULL DOSE 6191-1-001 Cement, Bone BONE CEMENT SIMPLEX FULL DOSE 6191-1-001  [employer] [id number] Left 2 Implanted   IMP TIBIAL ZIM PSN NP STM 5DEG [facility] -464-01 Total Joint Component/Insert IMP TIBIAL ZIM PSN NP [facility] 5DEG [facility] -584-01  [employer] [id number] Left 1 Implanted   IMP PATELLA ZIM KNEE ALL DARYN 32MM -244-32 Total Joint Component/Insert IMP PATELLA ZIM KNEE ALL DARYN 32MM -993-32  [employer] [id number] Left 1 Implanted   [employer] Persona Femur cemented CR Standard Left Size 8 Total Joint Component/Insert   [employer] [id number] Left 1 Implanted   [employer] Articular Surface MC Left 16mm ht  Total Joint Component/Insert   [employer]AC [id number] Left 1 Implanted     Drains: none  Intra-op Labs/Cxs/Specimens: None  Complications: No apparent complications during procedure  Findings: Please see dictated operative note for details    Disposition: Stable to PACU, then admit to Orthopaedics.    Post-Op Plan:  Assessment/Plan: Lasha Sims is a 55 year old male s/p Procedure(s):  ARTHROSCOPY, KNEE LEFT  ARTHROPLASTY, KNEE, TOTAL LEFT on 3/12/2021 with Dr. Willis.    Activity: Up with assist and assistive devices as needed until independent. Knee ROM as tolerated  Weight bearing status: WBAT    Antibiotics: Cefazolin x 24 hours  Diet: Begin with clear fluids and progress diet as tolerated.  Bowel regimen. Anti-emetics PRN.    DVT prophylaxis:  Mechanical + aspirin x 6 weeks  Elevation: Elevate heels off of bed on pillows, no pillows behind the knee at any time    Wound Care: tegaderm alginate x 7 days    Pain management: Orals PRN, IV for breakthrough only  X-rays: AP/Lat operative knee XR in PACU.  Physical Therapy: Mobilization, ROM, ADL's  Occupational Therapy: ADL's  Labs: Trend Hgb on PODs #1 & 2  Cultures: None  Consults: PT, OT. Hospitalist, appreciate assistance in caring for this patient throughout the perioperative period    Future Appointments   Date Time Provider Department Center   3/24/2021 10:45 AM Ton Jackson MD [facility]   4/21/2021 10:45 AM Ton Jackson MD [facility]   5/12/2021  7:00 AM Lyly Tian MD [facility]       Disposition: Pending progress with therapies, pain control on orals, and medical stability, anticipate discharge to Home POD1     Reji Enriquez MD  PGY-4, Orthopaedic Surgery  Pager: [phone]

## 2021-03-12 NOTE — ANESTHESIA PROCEDURE NOTES
Pre-Procedure   Staff -   Anesthesiologist:  Manny Giraldo MD  Performed By: anesthesiologist  Location: pre-op  Procedure Start/Stop Times: 3/12/2021 7:10 AM  Pre-Anesthestic Checklist: patient identified, IV checked, site marked, risks and benefits discussed, informed consent, monitors and equipment checked, pre-op evaluation, at physician/surgeon's request and post-op pain management  Timeout:  Correct Patient: Yes   Correct Procedure: Yes   Correct Site: Yes   Correct Position: Yes   Correct Laterality: Yes   Site Marked: Yes    Procedure Documentation  Procedure: Adductor canal  Diagnosis: R KNEE SURGERY  Laterality: left  Patient Position:supine  Patient Prep/Sterile Barriers: Chloraprep  Needle type: short bevel  Needle Gauge: 21.   Needle Length (Inches) 4   Ultrasound guided, Ultrasound used to identify targeted nerve, plexus, vascular marker, or fascial plane and place a needle adjacent to it in real-time, Ultrasound was used to visualize the spread of anesthetic in close proximity to the above referenced structure  A permanent image is entered into the patient's record.  There were no apparent abnormal pathologic findings    Assessment/Narrative      The placement was negative for: blood aspirated, painful injection and site bleeding  Paresthesias: No.  Bolus given via needle..   Secured via.   Insertion/Infusion Method: Single Shot  Complications: none

## 2021-03-12 NOTE — ANESTHESIA CARE TRANSFER NOTE
Patient: Lasha Sims    Procedure(s):  ARTHROSCOPY, KNEE LEFT  ARTHROPLASTY, KNEE, TOTAL LEFT    Diagnosis: Osteoarthritis of left knee, unspecified osteoarthritis type [M17.12]  Diagnosis Additional Information: No value filed.    Anesthesia Type:   Spinal, MAC     Note:    Oropharynx: oropharynx clear of all foreign objects  Level of Consciousness: awake  Oxygen Supplementation: room air    Independent Airway: airway patency satisfactory and stable  Dentition: dentition unchanged      Patient transferred to: PACU    Handoff Report: Identifed the Patient, Identified the Reponsible Provider, Reviewed the pertinent medical history, Discussed the surgical course, Reviewed Intra-OP anesthesia mangement and issues during anesthesia, Set expectations for post-procedure period and Allowed opportunity for questions and acknowledgement of understanding      Vitals: (Last set prior to Anesthesia Care Transfer)  CRNA VITALS  3/12/2021 1027 - 3/12/2021 1057      3/12/2021             Resp Rate (observed):  (!) 1        Electronically Signed By: PRASHANTH Lemus CRNA  March 12, 2021  10:57 AM

## 2021-03-12 NOTE — ANESTHESIA PREPROCEDURE EVALUATION
Anesthesia Pre-Procedure Evaluation    Patient: Lasha Sims   MRN: [id number] : 1965        Preoperative Diagnosis: Osteoarthritis of left knee, unspecified osteoarthritis type [M17.12]   Procedure : Procedure(s):  ARTHROSCOPY, KNEE LEFT  ARTHROPLASTY, KNEE, UNICOMPARTMENTAL LEFT  ARTHROPLASTY, KNEE, TOTAL LEFT     Past Medical History:   Diagnosis Date     Depressive disorder      Generalized anxiety disorder      Inappropriate sinus tachycardia 3/4/2021     Major depression, chronic      Mild hyperlipidemia      Osteoarthritis of left knee      Other neutropenia (H) 2020     Spontaneous pneumothorax 10/2010    Right side      Past Surgical History:   Procedure Laterality Date     APPENDECTOMY OPEN N/A      COLONOSCOPY  2017     HERNIA REPAIR Left      KNEE SURGERY Left     ACL repair and partioal lateral manisectomy     LUNG SURGERY       ORTHOPEDIC SURGERY  2003    ACL repair left     VT HAND/FINGER SURGERY UNLISTED       THORACOSCOPY Right 10/19/2010    for pneumothorax. upper lobe wedge resection      No Known Allergies   Social History     Tobacco Use     Smoking status: Never Smoker     Smokeless tobacco: Never Used     Tobacco comment: na   Substance Use Topics     Alcohol use: Yes     Frequency: Monthly or less      Wt Readings from Last 1 Encounters:   21 72.6 kg (160 lb 0.9 oz)        Anesthesia Evaluation   Pt has had prior anesthetic.         ROS/MED HX  ENT/Pulmonary:       Neurologic:       Cardiovascular:     (+) Dyslipidemia -----    METS/Exercise Tolerance:     Hematologic:       Musculoskeletal:       GI/Hepatic:       Renal/Genitourinary:       Endo:       Psychiatric/Substance Use:     (+) psychiatric history depression     Infectious Disease:       Malignancy:       Other:            Physical Exam    Airway        Mallampati: I   TM distance: > 3 FB   Neck ROM: full   Mouth opening: > 3 cm    Respiratory Devices and Support         Dental  no notable dental  history         Cardiovascular   cardiovascular exam normal          Pulmonary   pulmonary exam normal                OUTSIDE LABS:  CBC:   Lab Results   Component Value Date    WBC 3.0 (L) 03/09/2021    WBC 3.5 (L) 10/20/2020    HGB 14.3 03/09/2021    HGB 14.1 10/20/2020    HCT 43.1 03/09/2021    HCT 42.3 10/20/2020     03/09/2021     10/20/2020     BMP:   Lab Results   Component Value Date     03/11/2021     10/20/2020    POTASSIUM 3.9 03/11/2021    POTASSIUM 3.9 10/20/2020    CHLORIDE 103 03/11/2021    CHLORIDE 106 10/20/2020    CO2 34 (H) 03/11/2021    CO2 32 10/20/2020    BUN 18 03/11/2021    BUN 21 10/20/2020    CR 0.97 03/11/2021    CR 0.90 10/20/2020    GLC 57 (L) 03/11/2021     (H) 10/20/2020     COAGS: No results found for: PTT, INR, FIBR  POC:   Lab Results   Component Value Date     (H) 03/12/2021     HEPATIC:   Lab Results   Component Value Date    ALBUMIN 4.1 10/20/2020    PROTTOTAL 7.4 10/20/2020    ALT 34 10/20/2020    AST 4 10/20/2020    ALKPHOS 58 10/20/2020    BILITOTAL 0.8 10/20/2020     OTHER:   Lab Results   Component Value Date    A1C 5.6 10/20/2020    ORALIA 9.2 03/11/2021    TSH 0.81 10/28/2019    CRP <2.9 03/24/2020    SED 5 03/24/2020       Anesthesia Plan    ASA Status:  2      Anesthesia Type: MAC.     - Reason for MAC: immobility needed              Consents    Anesthesia Plan(s) and associated risks, benefits, and realistic alternatives discussed. Questions answered and patient/representative(s) expressed understanding.     - Discussed with:  Patient    Use of blood products discussed: Yes.     - Consented: consented to blood products     Postoperative Care    Pain management: Multi-modal analgesia.   PONV prophylaxis: Ondansetron (or other 5HT-3)     Comments:                Juan Luis Bender MD

## 2021-03-12 NOTE — ANESTHESIA PROCEDURE NOTES
Spinal/LP Procedure Note  Spinal Block    Staff -   Anesthesiologist:  Manny Giraldo MD  Performed By: anesthesiologist  Location: In OR BEFORE Induction  Procedure Start/Stop Times:      3/12/2021 7:35 AM    patient identified, IV checked, site marked, risks and benefits discussed, informed consent, monitors and equipment checked, pre-op evaluation, at physician/surgeon's request and post-op pain management      Correct Patient: Yes      Correct Position: Yes      Correct Site: Yes      Correct Procedure: Yes      Correct Laterality:  Yes    Site Marked:  Yes  Procedure:     Procedure:  Intrathecal    ASA:  2    Diagnosis:  Knee surgery    Position:  Sitting    Sterile Prep: chloraprep      Insertion site:  L2-3    Approach:  Midline    Needle Type:  [employer]    Needle gauge (G):  27    Local Skin Infiltration:  None    Needle Length (in):  3.5    Introducer used: No      Attempts:  1    Redirects:  0    CSF:  Clear    Paresthesias:  No    Time injected:  07:40

## 2021-03-12 NOTE — PROGRESS NOTES
03/12/21 1433   Quick Adds   Quick Adds Certification   Type of Visit Initial PT Evaluation   Living Environment   People in home spouse   Current Living Arrangements house   Home Accessibility stairs to enter home;stairs within home   Number of Stairs, Main Entrance 2   Stair Railings, Main Entrance none   Number of Stairs, Within Home, Primary 2   Stair Railings, Within Home, Primary none   Transportation Anticipated family or friend will provide   Self-Care   Usual Activity Tolerance excellent   Current Activity Tolerance moderate   Regular Exercise No   Equipment Currently Used at Home none   Disability/Function   Hearing Difficulty or Deaf no   Wear Glasses or Blind yes   Vision Management Glasses   Concentrating, Remembering or Making Decisions Difficulty no   Difficulty Communicating no   Difficulty Eating/Swallowing no   Walking or Climbing Stairs Difficulty no   Dressing/Bathing Difficulty no   Toileting issues no   Doing Errands Independently Difficulty (such as shopping) no   Fall history within last six months no   General Information   Onset of Illness/Injury or Date of Surgery 03/12/21   Referring Physician Dr MAURA Jackson   Patient/Family Therapy Goals Statement (PT) return to crossfit   Pertinent History of Current Problem (include personal factors and/or comorbidities that impact the POC) Pt is a 55 year old male S/P L TKA   Existing Precautions/Restrictions no known precautions/restrictions   Weight-Bearing Status - LLE weight-bearing as tolerated   Cognition   Orientation Status (Cognition) oriented x 4   Affect/Mental Status (Cognition) WFL   Follows Commands (Cognition) WFL   Pain Assessment   Patient Currently in Pain Yes, see Vital Sign flowsheet   Integumentary/Edema   Integumentary/Edema no deficits were identifed   Posture    Posture Not impaired   Range of Motion (ROM)   ROM Comment 0-   Strength   Strength Comments Pt not tested for strength   Bed Mobility   Comment (Bed  Mobility) SBA for bed mob   Transfers   Transfer Safety Comments SBA for transfers to FWW   Gait/Stairs (Locomotion)   [place] Level (Gait) supervision   Assistive Device (Gait) walker, front-wheeled   Distance in Feet (Required for LE Total Joints) 100   Pattern (Gait) step-through   Comment (Gait/Stairs) PT completed 3 stairs 3 times, the last attempts was without the use of rails. Pt was able to complete stairs safely    Balance   Balance no deficits were identified   Sensory Examination   Sensory Perception patient reports no sensory changes   Coordination   Coordination no deficits were identified   Clinical Impression   Criteria for Skilled Therapeutic Intervention evaluation only   PT Diagnosis (PT) weakness   Influenced by the following impairments weakness   Functional limitations due to impairments weakness   Clinical Presentation Stable/Uncomplicated   Clinical Presentation Rationale Pt presents as medically diagnosed   Clinical Decision Making (Complexity) low complexity   Therapy Frequency (PT) One time eval and treatment only   Predicted Duration of Therapy Intervention (days/wks) 1   Risk & Benefits of therapy have been explained evaluation/treatment results reviewed;care plan/treatment goals reviewed;risks/benefits reviewed;current/potential barriers reviewed;participants voiced agreement with care plan;participants included;patient;spouse/significant other   PT Discharge Planning    PT Discharge Recommendation (DC Rec) home with outpatient physical therapy   PT Rationale for DC Rec Pt is near baseline and wishes to start OPPT   PT Brief overview of current status  SBA for mobility    Therapy Certification   Start of care date 03/12/21   Certification date from 03/12/21   Certification date to 03/12/21   Medical Diagnosis TKA   Total Evaluation Time   Total Evaluation Time (Minutes) 10

## 2021-03-12 NOTE — OR NURSING
PACU to Inpatient Nursing Handoff    Patient Lasha Sims is a 55 year old male who speaks English.   Procedure Procedure(s):  ARTHROSCOPY, KNEE LEFT  ARTHROPLASTY, KNEE, TOTAL LEFT   Surgeon(s) Primary: Ton Jackson MD     No Known Allergies    Isolation  [unfilled]     Past Medical History   has a past medical history of Depressive disorder (2001), Generalized anxiety disorder (1990), Inappropriate sinus tachycardia (3/4/2021), Major depression, chronic, Mild hyperlipidemia, Osteoarthritis of left knee, Other neutropenia (H) (2/6/2020), and Spontaneous pneumothorax (10/2010).    Anesthesia Spinal   Dermatome Level Dermatomes Left: S1  Dermatomes Right: S1   Preop Meds versed - time given: 0723   Nerve block Adductor canal.  Location:left. Med:bupivacaine and Total Knee/Hip Cocktail (ropivacaine, epinephrine, ketorolac, morphine). Time given: 1000  spinal  .  Location:left. Med:bupivacaine. Time given: 1000   Intraop Meds dexamethasone (Decadron)  fentanyl (Sublimaze): 100 mcg total  hydromorphone (Dilaudid): 1 mg total  ondansetron (Zofran): last given at 1023  ketamine 10 mg   Local Meds Yes - Local Cocktail (morphine, ropivacaine, epinephrine, Toradol)   Antibiotics cefazolin (Ancef) - last given at 0948     Pain Patient Currently in Pain: yes   PACU meds  fentanyl (Sublimaze): 100 mcg (total dose) last given at 1123   hydromorphone (Dilaudid): 0.5 mg (total dose) last given at 1150    PCA / epidural No   Capnography     Telemetry ECG Rhythm: Normal sinus rhythm   Inpatient Telemetry Monitor Ordered? No        Labs Glucose Lab Results   Component Value Date    GLC 57 03/11/2021       Hgb Lab Results   Component Value Date    HGB 14.3 03/09/2021       INR No results found for: INR   PACU Imaging Completed     Wound/Incision Incision/Surgical Site 03/12/21 Left Knee (Active)   Incision Assessment UTV 03/12/21 1115   Closure SHYANN 03/12/21 1115   Incision Drainage Amount None 03/12/21 1115   Dressing  Intervention Clean, dry, intact 03/12/21 1115   Number of days: 0      CMS        Equipment ice pack   Other LDA       IV Access Peripheral IV 03/12/21 Right Wrist (Active)   Site Assessment WDL 03/12/21 1115   Line Status Infusing;Checked every 1 hour 03/12/21 1115   Dressing Intervention New dressing  03/12/21 0630   Phlebitis Scale 0-->no symptoms 03/12/21 1115   Infiltration Scale 0 03/12/21 0630   Infiltration Site Treatment Method  None 03/12/21 0630   Number of days: 0      Blood Products Not applicable EBL 50   mL   Intake/Output Date 03/12/21 0700 - 03/13/21 0659   Shift 4891-9602 1582-3818 8834-9396 24 Hour Total   INTAKE   I.V. 1100   1100   Shift Total(mL/kg) 1100(15.15)   1100(15.15)   OUTPUT   Blood 50   50   Shift Total(mL/kg) 50(0.69)   50(0.69)   Weight (kg) 72.6 72.6 72.6 72.6      Drains / Braden     Time of void PreOp Void Prior to Procedure: 0550 (03/12/21 0606)    PostOp      Diapered? No   Bladder Scan Bladder Scan Volume (mL): 550 ml (03/12/21 1100)   PO    tolerating sips and crackers     Vitals    B/P: 107/67  T: 97.8  F (36.6  C)    Temp src: Axillary  P:  Pulse: 65 (03/12/21 1145)          R: 9  O2:  SpO2: 100 %    O2 Device: None (Room air) (03/12/21 1145)    Oxygen Delivery: 2 LPM (03/12/21 0715)         Family/support present significant other   Patient belongings     Patient transported on bed   DC meds/scripts (obs/outpt) Yes, meds   Inpatient Pain Meds Released? Yes       Special needs/considerations bladder scannned for 550, trying to void now.   Tasks needing completion None       Renita Ivory RN  ASCOM [id number]

## 2021-03-12 NOTE — PROGRESS NOTES
Nutrition Note    Received calorie count orders. Spoke with RN regarding calorie counts and sent calorie count order through tube system. RD has not been following pt as he was admitted today for TKA. RD will follow up on calorie counts on Monday if patient is still here.

## 2021-03-12 NOTE — PLAN OF CARE
[unfilled]                                                                              [facility]      OUTPATIENT PHYSICAL THERAPY EVALUATION  PLAN OF TREATMENT FOR OUTPATIENT REHABILITATION  (COMPLETE FOR INITIAL CLAIMS ONLY)  Patient's Last Name, First Name, M.I.  YOB: 1965  Lasha Sims                        Provider's Name  [facility] Medical Record No.  [id number]                               Onset Date:  03/12/21   Start of Care Date:  03/12/21      Type:     _X_PT   ___OT   ___SLP Medical Diagnosis:  TKA                        PT Diagnosis:  weakness   Visits from SOC:  1   _________________________________________________________________________________  Plan of Treatment/Functional Goals    Planned Interventions:       Goals: See Physical Therapy Goals on Care Plan in [employer] electronic health record.    Therapy Frequency: One time eval and treatment only  Predicted Duration of Therapy Intervention: 1  _________________________________________________________________________________    I CERTIFY THE NEED FOR THESE SERVICES FURNISHED UNDER        THIS PLAN OF TREATMENT AND WHILE UNDER MY CARE     (Physician co-signature of this document indicates review and certification of the therapy plan).              Certification date from: 03/12/21, Certification date to: 03/12/21    Referring Physician: Dr MAURA Jackson            Initial Assessment        See Physical Therapy evaluation dated 03/12/21 in Epic electronic health record.

## 2021-03-12 NOTE — PLAN OF CARE
VS: VSS, pt denies CP or SOB.    O2: Room air sat. > 90%   Output: Pt voided adequate amount without difficulty.    Last BM: 03/11/21   Activity: Up walked on the ken with PT.    Skin: Intact except surgical incision.    Pain: Comfortably manageable with PRN medication.    CMS: CMS and neuro intact to baseline.    Dressing: CDI.    Diet: Regular tolerating without N/V.    LDA: PIV infusing.    Equipment: IV pole, CAPNO, PCD and personal belongings.    Plan: May discharge home this evening.    Additional Info:

## 2021-03-12 NOTE — PHARMACY-ADMISSION MEDICATION HISTORY
Admission medication history interview status for the 3/12/2021 admission is complete. See Epic admission navigator for allergy information, pharmacy, prior to admission medications and immunization status.     Medication history interview sources:  patient via phone, surescripts    Changes made to PTA medication list (reason)  Added: Ibuprofen and acetaminophen per patient  Deleted: none  Changed: none    Additional medication history information (including reliability of information, actions taken by pharmacist):  Fluoxetine: dose was increased from 20 mg to 40 mg po HS recently      Prior to Admission medications    Medication Sig Last Dose Taking? Auth Provider   acetaminophen (TYLENOL) 500 MG tablet Take 500 mg by mouth every 8 hours as needed for mild pain or fever Past Week at Unknown time Yes Unknown, Entered By History   FLUoxetine (PROZAC) 20 MG capsule Take 1 capsule (20 mg) by mouth daily  Patient taking differently: Take 40 mg by mouth At Bedtime  3/11/2021 at 2000 Yes Pal Wilks MD   ibuprofen (ADVIL/MOTRIN) 200 MG tablet Take 600-800 mg by mouth every 8 hours as needed for mild pain Past Week at Unknown time Yes Unknown, Entered By History   simvastatin (ZOCOR) 10 MG tablet Take 1 tablet (10 mg) by mouth At Bedtime 3/11/2021 at 2000 Yes Pal Wilks MD         Medication history completed by: Bety Barker PharmD, BCPS March 12, 2021

## 2021-03-13 NOTE — PLAN OF CARE
Pt discharged home with family, discharge medication and discharge instruction given all belongings sent home with pt and pt understand discharge plan.

## 2021-03-16 ENCOUNTER — TELEPHONE (OUTPATIENT)
Dept: OTHER | Facility: CLINIC | Age: 56
End: 2021-03-16

## 2021-03-16 NOTE — TELEPHONE ENCOUNTER
Orthopedic telephone note:    Patient called the resident triage line today to discuss new left thigh swelling.  The patient is 4 days status post left total knee arthroplasty with Dr. Jackson.  Patient reports that he has been using the Ace wrap on his leg and when he took the Ace wrap off this evening he had noticed an increased area of swelling and bruising on his proximal left thigh.  He reports that he was working at his desk today and so his leg was not elevated for the majority of the day.    He also reports an increase in pain today.  He states that he has ran out of his oxycodone tablets.  He was prescribed 30 tablets at the time of discharge.  He has been taking Tylenol which has adequately been controlling his pain.  He also reports that he feels some general malaise as well as a low-grade temperature, around 99 and never breaching 100.  He also has some palpitations, which she also had prior to surgery.  He also reports a scratchy throat.    Assessment/plan:  At this point, his thigh swelling and ecchymosis can be coming from 2 sources.  This could be bruising from the surgery itself or from a placement of a tourniquet.  Either way, his preop hemoglobin was over 14 and thus he is unlikely to have sustained a significant blood loss to cause systemic changes.  I encouraged him to call clinic in the morning for a oxycodone refill.  In addition, I discussed with him that if his symptoms worsen including if he develops a fever above 100.4, persistently worsening swelling, any development of numbness/tingling or weakness in the leg, that he should come in for evaluation.  Otherwise, recommend continued follow-up with Dr. Jackson as scheduled.    Patient was grateful for the call back and recommendations.    Kurt Nichols MD  Orthopaedic Surgery PGY-4  #: [phone]

## 2021-03-17 DIAGNOSIS — M17.12 PRIMARY OSTEOARTHRITIS OF LEFT KNEE: ICD-10-CM

## 2021-03-17 RX ORDER — OXYCODONE HYDROCHLORIDE 5 MG/1
5-10 TABLET ORAL EVERY 4 HOURS PRN
Qty: 40 TABLET | Refills: 0 | Status: SHIPPED | OUTPATIENT
Start: 2021-03-17 | End: 2021-03-29

## 2021-03-17 NOTE — TELEPHONE ENCOUNTER
RN sent script to Dr. Jackson to sign and authorize.    Lissette Sharma RN        [facility] Call Center    Phone Message    May a detailed message be left on voicemail: yes     Reason for Call: Medication Refill Request    Has the patient contacted the pharmacy for the refill? Yes   Name of medication being requested: Oxycodone 5MG  Provider who prescribed the medication: Ton Jackson  Pharmacy: [facility] [phone]  Date medication is needed: ASAP      Action Taken: Message routed to:  [facility] Orthopedics    Travel Screening: Not Applicable

## 2021-03-22 ENCOUNTER — TELEPHONE (OUTPATIENT)
Dept: ORTHOPEDICS | Facility: CLINIC | Age: 56
End: 2021-03-22

## 2021-03-22 NOTE — TELEPHONE ENCOUNTER
RN called and spoke with patient. Triage patient's condition. Complained of muscle calf stiffness and pain. Patient denies any shortness of breath. Denies fever and chills. Stated it comes and goes. Denies swelling. Patient has been icing on and off. RN suggested patient to try heatpack, wrapped around the calf and elevate. Also avoid bearing weight on that affected calf for now. Patient expressed understanding and will keep RN updated. Patient does have an appt with Dr. Jackson this coming Weds.    Lissette Sharma RN         [facility]    Phone Message    May a detailed message be left on voicemail: yes     Reason for Call: Other: Patient is in pain and would like to discuss pain management. Please call pt back asap      Action Taken: Message routed to:  [facility] ortho    Travel Screening: Not Applicable

## 2021-03-23 ENCOUNTER — DOCUMENTATION ONLY (OUTPATIENT)
Dept: OTHER | Facility: CLINIC | Age: 56
End: 2021-03-23

## 2021-03-23 ENCOUNTER — AMBULATORY - HEALTHEAST (OUTPATIENT)
Dept: OTHER | Facility: CLINIC | Age: 56
End: 2021-03-23

## 2021-03-24 ENCOUNTER — OFFICE VISIT (OUTPATIENT)
Dept: ORTHOPEDICS | Facility: CLINIC | Age: 56
End: 2021-03-24
Payer: COMMERCIAL

## 2021-03-24 DIAGNOSIS — M17.12 PRIMARY OSTEOARTHRITIS OF LEFT KNEE: Primary | ICD-10-CM

## 2021-03-24 PROCEDURE — 99024 POSTOP FOLLOW-UP VISIT: CPT | Performed by: STUDENT IN AN ORGANIZED HEALTH CARE EDUCATION/TRAINING PROGRAM

## 2021-03-24 RX ORDER — DIAZEPAM 5 MG
5 TABLET ORAL EVERY 6 HOURS PRN
Qty: 20 TABLET | Refills: 0 | Status: SHIPPED | OUTPATIENT
Start: 2021-03-24 | End: 2021-03-29

## 2021-03-24 ASSESSMENT — ENCOUNTER SYMPTOMS
LIGHT-HEADEDNESS: 0
SLEEP DISTURBANCES DUE TO BREATHING: 0
LEG PAIN: 0
EXERCISE INTOLERANCE: 0
PALPITATIONS: 1
HYPOTENSION: 0
ORTHOPNEA: 0
HYPERTENSION: 0
SYNCOPE: 0

## 2021-03-24 NOTE — PROGRESS NOTES
[facility]  Orthopaedic Surgery Consultation by Ton Jackson M.D.    Lasha Sims MRN# [id number]   Age: 55 year old YOB: 1965     Requesting physician: Myesha Carter     Background history:  DX:  1. Spontaneous pneumothorax  2. Hyperlipidemia  3. Hypercholesterolemia  4. Depressive disorder    TREATMENTS:  1. 2004, ACL reconstruction left knee, [facility].  2. 3/12/2021, left total knee replacement, , [facility]          History of Present Illness:     55-year-old male status post ACL reconstruction and varus alignment with pain of left knee most likely due to medial compartmental osteoarthritic changes who underwent left total knee arthroplasty on 3/12/2021.  Patient states that he is doing reasonably well.  He is not experiencing too much pain in his knee.  His preoperative pain has disappeared.  For the past 3 days he is been experiencing more pain in his proximal calf.  Simultaneously he also weaned off his pain medication.  He describes the pain more as a cramping pain which is hard to find relief from.  There is no tenderness to palpation.  No pain upon passive stretch.  He is doing his physical therapy exercises.    Social:   Occupation: Desk job  Living situation: With spouse  Hobbies / Sports: CrossFit, walking dog  Smoking: No  Alcohol: No  Illicit drug use: No         Physical Exam:     EXAMINATION pertinent findings:   PSYCH: Pleasant, healthy-appearing, alert, oriented x3, cooperative. Normal mood and affect.  VITAL SIGNS: There were no vitals taken for this visit.  Reviewed nursing intake notes.   There is no height or weight on file to calculate BMI.  RESP: non labored breathing   ABD: benign, soft, non-tender, no acute peritoneal findings  SKIN: grossly normal   LYMPHATIC: grossly normal, no adenopathy, no extremity edema  NEURO: grossly normal , no motor deficits  VASCULAR: satisfactory perfusion of all extremities    MUSCULOSKELETAL:   Alignment: Neutral    L knee: Incision is clean, dry and intact.  No signs of infection.  ROM 95-0-0  . Straight leg raise +. No redness, warmth or skin changes present.  Some effusion present. Ligamentously stable in both ML and AP direction.  No signs of DVT.    Left LE:   Thigh and leg compartments soft and compressible   +Quad/TA/GSC/FHL/EHL   SILT DP/SP/Dionne/Saph/Tib nerve distributions   Palpable dorsalis pedis pulse            Data:   All laboratory data reviewed  All imaging studies reviewed by me personally.    XR knee left 3/12/2021:  My interpretation: Status post left total knee arthroplasty.  Adequate positioning and sizing of components.  Adequate fixation.  No signs of fractures or immediate postoperative complications.         Assessment and Plan:   Assessment:  55-year-old male status post ACL reconstruction and varus alignment with pain of left knee most likely due to medial compartmental osteoarthritic changes who underwent left total knee arthroplasty on 3/12/2021.  Recovering well.  Experiencing postoperative pain.  No signs of DVT.    Plan:  I extensively discussed my findings with the patient.  We went over the intraoperative findings of significant osteoarthritic changes of patellofemoral and lateral compartment as well and the reasons why we converted to a total knee arthroplasty.  In order to mitigate patient's residual pain I prescribed him diazepam and advised him to use his oxycodone every 6 hours in combination with his Tylenol and NSAIDs for the next days.  He will contact us Monday if this utilization of pain regiment is not sufficient.  Patient will continue his DVT prophylaxis and physical therapy exercises.  If all goes well we will follow up 6 weeks postoperatively with renewed radiographic imaging.    Ton Jackson MD, PhD     Adult Reconstruction  [facility] Department of Orthopaedic Surgery  Pager [phone]      DATA  for DOCUMENTATION:         Past Medical History:     Patient Active Problem List   Diagnosis     Osteoarthritis of left knee     Major depression, chronic     Generalized anxiety disorder     Hypercholesteremia     Other neutropenia (H)     IFG (impaired fasting glucose)     Inappropriate sinus tachycardia     Osteoarthritis of left knee, unspecified osteoarthritis type     Past Medical History:   Diagnosis Date     Depressive disorder 2001     Generalized anxiety disorder 1990     Inappropriate sinus tachycardia 3/4/2021     Major depression, chronic      Mild hyperlipidemia      Osteoarthritis of left knee      Other neutropenia (H) 2/6/2020     Spontaneous pneumothorax 10/2010    Right side       Also see scanned health assessment forms.       Past Surgical History:     Past Surgical History:   Procedure Laterality Date     APPENDECTOMY OPEN N/A      ARTHROPLASTY KNEE Left 3/12/2021    Procedure: ARTHROPLASTY, KNEE, TOTAL LEFT;  Surgeon: Ton Jackson MD;  Location: [facility] OR     ARTHROSCOPY KNEE Left 3/12/2021    Procedure: ARTHROSCOPY, KNEE LEFT;  Surgeon: Ton Jackson MD;  Location: [facility] OR     COLONOSCOPY  2017     HERNIA REPAIR Left      KNEE SURGERY Left 2003    ACL repair and partioal lateral manisectomy     LUNG SURGERY       ORTHOPEDIC SURGERY  2003    ACL repair left     SD HAND/FINGER SURGERY UNLISTED       THORACOSCOPY Right 10/19/2010    for pneumothorax. upper lobe wedge resection            Social History:     Social History     Socioeconomic History     Marital status:      Spouse name: Not on file     Number of children: Not on file     Years of education: Not on file     Highest education level: Not on file   Occupational History     Not on file   Social Needs     Financial resource strain: Not on file     Food insecurity     Worry: Not on file     Inability: Not on file     Transportation needs     Medical: Not on file     Non-medical: Not on file   Tobacco Use     Smoking status:  Never Smoker     Smokeless tobacco: Never Used     Tobacco comment: na   Substance and Sexual Activity     Alcohol use: Yes     Frequency: Monthly or less     Drug use: Never     Sexual activity: Yes     Partners: Female     Birth control/protection: None   Lifestyle     Physical activity     Days per week: Not on file     Minutes per session: Not on file     Stress: Not on file   Relationships     Social connections     Talks on phone: Not on file     Gets together: Not on file     Attends Anabaptism service: Not on file     Active member of club or organization: Not on file     Attends meetings of clubs or organizations: Not on file     Relationship status: Not on file     Intimate partner violence     Fear of current or ex partner: Not on file     Emotionally abused: Not on file     Physically abused: Not on file     Forced sexual activity: Not on file   Other Topics Concern     Parent/sibling w/ CABG, MI or angioplasty before 65F 55M? No   Social History Narrative     Not on file            Family History:       Family History   Problem Relation Age of Onset     Hyperlipidemia Mother      Multiple myeloma Father 60     Other Cancer Father         multiple myloma     Prostate Cancer Brother      Brain Cancer Paternal Grandfather 60     Depression Daughter             Medications:     Current Outpatient Medications   Medication Sig     acetaminophen (TYLENOL) 325 MG tablet Take 2 tablets (650 mg) by mouth every 4 hours as needed for other (mild pain)     acetaminophen (TYLENOL) 500 MG tablet Take 500 mg by mouth every 8 hours as needed for mild pain or fever      aspirin 81 MG EC tablet Take 1 tablet (81 mg) by mouth 2 times daily     FLUoxetine (PROZAC) 20 MG capsule Take 1 capsule (20 mg) by mouth daily (Patient taking differently: Take 40 mg by mouth At Bedtime )     ibuprofen (ADVIL/MOTRIN) 200 MG tablet Take 600-800 mg by mouth every 8 hours as needed for mild pain     ibuprofen (ADVIL/MOTRIN) 600 MG tablet  Take 1 tablet (600 mg) by mouth every 6 hours as needed for pain (mild)     oxyCODONE (ROXICODONE) 5 MG tablet Take 1-2 tablets (5-10 mg) by mouth every 4 hours as needed for pain (Moderate to Severe)     polyethylene glycol (MIRALAX) 17 g packet Take 17 g by mouth daily     senna-docusate (SENOKOT-S/PERICOLACE) 8.6-50 MG tablet Take 1-2 tablets by mouth 2 times daily Take while on oral narcotics to prevent or treat constipation.     simvastatin (ZOCOR) 10 MG tablet Take 1 tablet (10 mg) by mouth At Bedtime     No current facility-administered medications for this visit.               Review of Systems:   A comprehensive 10 point review of systems (constitutional, ENT, cardiac, peripheral vascular, lymphatic, respiratory, GI, , Musculoskeletal, skin, Neurological) was performed and found to be negative except as described in this note.     See intake form completed by patient        Answers for HPI/ROS submitted by the patient on 3/24/2021   General Symptoms: No  Skin Symptoms: No  HENT Symptoms: No  EYE SYMPTOMS: No  HEART SYMPTOMS: Yes  LUNG SYMPTOMS: No  INTESTINAL SYMPTOMS: No  URINARY SYMPTOMS: No  REPRODUCTIVE SYMPTOMS: No  SKELETAL SYMPTOMS: No  BLOOD SYMPTOMS: No  NERVOUS SYSTEM SYMPTOMS: No  MENTAL HEALTH SYMPTOMS: No  Chest pain or pressure: Yes  Fast or irregular heartbeat: Yes  Pain in legs with walking: No  Trouble breathing while lying down: No  Fingers or toes appear blue: No  High blood pressure: No  Low blood pressure: No  Fainting: No  Murmurs: No  Pacemaker: No  Varicose veins: No  Edema or swelling: No  Wake up at night with shortness of breath: No  Light-headedness: No  Exercise intolerance: No

## 2021-03-24 NOTE — LETTER
3/24/2021         RE: Lasha Sims  [id number] [facility] [address]        Dear Colleague,    Thank you for referring your patient, Lasha Sims, to the [facility] [place]. Please see a copy of my visit note below.        [facility]  Orthopaedic Surgery Consultation by Ton Jackson M.D.    Lasha Sims MRN# [id number]   Age: 55 year old YOB: 1965     Requesting physician: Myesha Carter     Background history:  DX:  1. Spontaneous pneumothorax  2. Hyperlipidemia  3. Hypercholesterolemia  4. Depressive disorder    TREATMENTS:  1. 2004, ACL reconstruction left knee, [facility].  2. 3/12/2021, left total knee replacement, , [facility]          History of Present Illness:     55-year-old male status post ACL reconstruction and varus alignment with pain of left knee most likely due to medial compartmental osteoarthritic changes who underwent left total knee arthroplasty on 3/12/2021.  Patient states that he is doing reasonably well.  He is not experiencing too much pain in his knee.  His preoperative pain has disappeared.  For the past 3 days he is been experiencing more pain in his proximal calf.  Simultaneously he also weaned off his pain medication.  He describes the pain more as a cramping pain which is hard to find relief from.  There is no tenderness to palpation.  No pain upon passive stretch.  He is doing his physical therapy exercises.    Social:   Occupation: Desk job  Living situation: With spouse  Hobbies / Sports: CrossFit, walking dog  Smoking: No  Alcohol: No  Illicit drug use: No         Physical Exam:     EXAMINATION pertinent findings:   PSYCH: Pleasant, healthy-appearing, alert, oriented x3, cooperative. Normal mood and affect.  VITAL SIGNS: There were no vitals taken for this visit.  Reviewed nursing intake notes.   There is no height or weight on file to calculate BMI.  RESP: non  labored breathing   ABD: benign, soft, non-tender, no acute peritoneal findings  SKIN: grossly normal   LYMPHATIC: grossly normal, no adenopathy, no extremity edema  NEURO: grossly normal , no motor deficits  VASCULAR: satisfactory perfusion of all extremities   MUSCULOSKELETAL:   Alignment: Neutral    L knee: Incision is clean, dry and intact.  No signs of infection.  ROM 95-0-0  . Straight leg raise +. No redness, warmth or skin changes present.  Some effusion present. Ligamentously stable in both ML and AP direction.  No signs of DVT.    Left LE:   Thigh and leg compartments soft and compressible   +Quad/TA/GSC/FHL/EHL   SILT DP/SP/Dionne/Saph/Tib nerve distributions   Palpable dorsalis pedis pulse            Data:   All laboratory data reviewed  All imaging studies reviewed by me personally.    XR knee left 3/12/2021:  My interpretation: Status post left total knee arthroplasty.  Adequate positioning and sizing of components.  Adequate fixation.  No signs of fractures or immediate postoperative complications.         Assessment and Plan:   Assessment:  55-year-old male status post ACL reconstruction and varus alignment with pain of left knee most likely due to medial compartmental osteoarthritic changes who underwent left total knee arthroplasty on 3/12/2021.  Recovering well.  Experiencing postoperative pain.  No signs of DVT.    Plan:  I extensively discussed my findings with the patient.  We went over the intraoperative findings of significant osteoarthritic changes of patellofemoral and lateral compartment as well and the reasons why we converted to a total knee arthroplasty.  In order to mitigate patient's residual pain I prescribed him diazepam and advised him to use his oxycodone every 6 hours in combination with his Tylenol and NSAIDs for the next days.  He will contact us Monday if this utilization of pain regiment is not sufficient.  Patient will continue his DVT prophylaxis and physical therapy  exercises.  If all goes well we will follow up 6 weeks postoperatively with renewed radiographic imaging.    Ton Jackson MD, PhD     Adult Reconstruction  [facility] Department of Orthopaedic Surgery  Pager [phone]      DATA for DOCUMENTATION:         Past Medical History:     Patient Active Problem List   Diagnosis     Osteoarthritis of left knee     Major depression, chronic     Generalized anxiety disorder     Hypercholesteremia     Other neutropenia (H)     IFG (impaired fasting glucose)     Inappropriate sinus tachycardia     Osteoarthritis of left knee, unspecified osteoarthritis type     Past Medical History:   Diagnosis Date     Depressive disorder 2001     Generalized anxiety disorder 1990     Inappropriate sinus tachycardia 3/4/2021     Major depression, chronic      Mild hyperlipidemia      Osteoarthritis of left knee      Other neutropenia (H) 2/6/2020     Spontaneous pneumothorax 10/2010    Right side       Also see scanned health assessment forms.       Past Surgical History:     Past Surgical History:   Procedure Laterality Date     APPENDECTOMY OPEN N/A      ARTHROPLASTY KNEE Left 3/12/2021    Procedure: ARTHROPLASTY, KNEE, TOTAL LEFT;  Surgeon: Tno Jackson MD;  [facility]     ARTHROSCOPY KNEE Left 3/12/2021    Procedure: ARTHROSCOPY, KNEE LEFT;  Surgeon: Ton Jackson MD;  [facility]     COLONOSCOPY  2017     HERNIA REPAIR Left      KNEE SURGERY Left 2003    ACL repair and partioal lateral manisectomy     LUNG SURGERY       ORTHOPEDIC SURGERY  2003    ACL repair left     [place] HAND/FINGER SURGERY UNLISTED       THORACOSCOPY Right 10/19/2010    for pneumothorax. upper lobe wedge resection            Social History:     Social History     Socioeconomic History     Marital status:      Spouse name: Not on file     Number of children: Not on file     Years of education: Not on file     Highest education level: Not on file    Occupational History     Not on file   Social Needs     Financial resource strain: Not on file     Food insecurity     Worry: Not on file     Inability: Not on file     Transportation needs     Medical: Not on file     Non-medical: Not on file   Tobacco Use     Smoking status: Never Smoker     Smokeless tobacco: Never Used     Tobacco comment: na   Substance and Sexual Activity     Alcohol use: Yes     Frequency: Monthly or less     Drug use: Never     Sexual activity: Yes     Partners: Female     Birth control/protection: None   Lifestyle     Physical activity     Days per week: Not on file     Minutes per session: Not on file     Stress: Not on file   Relationships     Social connections     Talks on phone: Not on file     Gets together: Not on file     Attends Anabaptism service: Not on file     Active member of club or organization: Not on file     Attends meetings of clubs or organizations: Not on file     Relationship status: Not on file     Intimate partner violence     Fear of current or ex partner: Not on file     Emotionally abused: Not on file     Physically abused: Not on file     Forced sexual activity: Not on file   Other Topics Concern     Parent/sibling w/ CABG, MI or angioplasty before 65F 55M? No   Social History Narrative     Not on file            Family History:       Family History   Problem Relation Age of Onset     Hyperlipidemia Mother      Multiple myeloma Father 60     Other Cancer Father         multiple myloma     Prostate Cancer Brother      Brain Cancer Paternal Grandfather 60     Depression Daughter             Medications:     Current Outpatient Medications   Medication Sig     acetaminophen (TYLENOL) 325 MG tablet Take 2 tablets (650 mg) by mouth every 4 hours as needed for other (mild pain)     acetaminophen (TYLENOL) 500 MG tablet Take 500 mg by mouth every 8 hours as needed for mild pain or fever      aspirin 81 MG EC tablet Take 1 tablet (81 mg) by mouth 2 times daily      FLUoxetine (PROZAC) 20 MG capsule Take 1 capsule (20 mg) by mouth daily (Patient taking differently: Take 40 mg by mouth At Bedtime )     ibuprofen (ADVIL/MOTRIN) 200 MG tablet Take 600-800 mg by mouth every 8 hours as needed for mild pain     ibuprofen (ADVIL/MOTRIN) 600 MG tablet Take 1 tablet (600 mg) by mouth every 6 hours as needed for pain (mild)     oxyCODONE (ROXICODONE) 5 MG tablet Take 1-2 tablets (5-10 mg) by mouth every 4 hours as needed for pain (Moderate to Severe)     polyethylene glycol (MIRALAX) 17 g packet Take 17 g by mouth daily     senna-docusate (SENOKOT-S/PERICOLACE) 8.6-50 MG tablet Take 1-2 tablets by mouth 2 times daily Take while on oral narcotics to prevent or treat constipation.     simvastatin (ZOCOR) 10 MG tablet Take 1 tablet (10 mg) by mouth At Bedtime     No current facility-administered medications for this visit.               Review of Systems:   A comprehensive 10 point review of systems (constitutional, ENT, cardiac, peripheral vascular, lymphatic, respiratory, GI, , Musculoskeletal, skin, Neurological) was performed and found to be negative except as described in this note.     See intake form completed by patient        Answers for HPI/ROS submitted by the patient on 3/24/2021   General Symptoms: No  Skin Symptoms: No  HENT Symptoms: No  EYE SYMPTOMS: No  HEART SYMPTOMS: Yes  LUNG SYMPTOMS: No  INTESTINAL SYMPTOMS: No  URINARY SYMPTOMS: No  REPRODUCTIVE SYMPTOMS: No  SKELETAL SYMPTOMS: No  BLOOD SYMPTOMS: No  NERVOUS SYSTEM SYMPTOMS: No  MENTAL HEALTH SYMPTOMS: No  Chest pain or pressure: Yes  Fast or irregular heartbeat: Yes  Pain in legs with walking: No  Trouble breathing while lying down: No  Fingers or toes appear blue: No  High blood pressure: No  Low blood pressure: No  Fainting: No  Murmurs: No  Pacemaker: No  Varicose veins: No  Edema or swelling: No  Wake up at night with shortness of breath: No  Light-headedness: No  Exercise intolerance: No

## 2021-03-26 ENCOUNTER — TELEPHONE (OUTPATIENT)
Dept: ORTHOPEDICS | Facility: CLINIC | Age: 56
End: 2021-03-26

## 2021-03-26 NOTE — TELEPHONE ENCOUNTER
Called and LVM for pt to call back. Dr. Jackson wanted him to be seen inperson for his next follow up. Ok to schedule on 4/14 in any return spot.

## 2021-03-29 ENCOUNTER — TELEPHONE (OUTPATIENT)
Dept: ORTHOPEDICS | Facility: CLINIC | Age: 56
End: 2021-03-29

## 2021-03-29 DIAGNOSIS — M17.12 PRIMARY OSTEOARTHRITIS OF LEFT KNEE: ICD-10-CM

## 2021-03-29 DIAGNOSIS — I82.412 ACUTE DEEP VEIN THROMBOSIS (DVT) OF FEMORAL VEIN OF LEFT LOWER EXTREMITY (H): ICD-10-CM

## 2021-03-29 DIAGNOSIS — M17.12 PRIMARY OSTEOARTHRITIS OF LEFT KNEE: Primary | ICD-10-CM

## 2021-03-29 RX ORDER — DIAZEPAM 5 MG
5 TABLET ORAL EVERY 6 HOURS PRN
Qty: 20 TABLET | Refills: 0 | Status: SHIPPED | OUTPATIENT
Start: 2021-03-29 | End: 2022-02-10

## 2021-03-29 RX ORDER — OXYCODONE HYDROCHLORIDE 5 MG/1
5-10 TABLET ORAL EVERY 4 HOURS PRN
Qty: 40 TABLET | Refills: 0 | Status: SHIPPED | OUTPATIENT
Start: 2021-03-29 | End: 2022-02-10

## 2021-03-29 NOTE — TELEPHONE ENCOUNTER
RN received call from patient. Need another refill of oxycodone and Diazepam.  RN sent to request to Dr. Jackson to sign and authorize.    Lissette Sharma RN

## 2021-03-29 NOTE — TELEPHONE ENCOUNTER
Returned pt call. Discussed importance of ruling out any local pathology/DVT in spite of low index of suspicion clinically.      Ton Jackson MD, PhD     Adult Reconstruction  [facility] Department of Orthopaedic Surgery  Pager [phone]

## 2021-03-29 NOTE — PROGRESS NOTES
RN called and spoke with patient. Per Dr. Jackson's request, RN placed an order for US for lower left extremity to rule out DVT.  RN gave patient the scheduling number to call. Patient expressed understanding and will notify RN once the test is completed so RN can have Dr. Jackson review it and formulate a plan moving forward.    Lissette Sharma RN

## 2021-03-29 NOTE — TELEPHONE ENCOUNTER
[facility]    Phone Message    May a detailed message be left on voicemail: yes     Reason for Call: Medication Question or concern regarding medication   Prescription Clarification  Name of Medication: diazepam  Prescribing Provider: Dr. Jackson   Pharmacy:    What on the order needs clarification? Per MD orders, patient has tried this medication for 5 days and it isn't working.    Would like to try something else.    Please call patient to discuss.    Action Taken: Message routed to:  [facility]o    Travel Screening: Not Applicable

## 2021-03-30 ENCOUNTER — ANCILLARY PROCEDURE (OUTPATIENT)
Dept: ULTRASOUND IMAGING | Facility: CLINIC | Age: 56
End: 2021-03-30
Attending: STUDENT IN AN ORGANIZED HEALTH CARE EDUCATION/TRAINING PROGRAM
Payer: COMMERCIAL

## 2021-03-30 DIAGNOSIS — I82.412 ACUTE DEEP VEIN THROMBOSIS (DVT) OF FEMORAL VEIN OF LEFT LOWER EXTREMITY (H): ICD-10-CM

## 2021-03-30 DIAGNOSIS — M17.12 PRIMARY OSTEOARTHRITIS OF LEFT KNEE: ICD-10-CM

## 2021-03-30 PROCEDURE — 93971 EXTREMITY STUDY: CPT | Mod: LT | Performed by: RADIOLOGY

## 2021-04-06 DIAGNOSIS — Z96.652 HX OF TOTAL KNEE ARTHROPLASTY, LEFT: Primary | ICD-10-CM

## 2021-04-14 ENCOUNTER — OFFICE VISIT (OUTPATIENT)
Dept: ORTHOPEDICS | Facility: CLINIC | Age: 56
End: 2021-04-14
Payer: COMMERCIAL

## 2021-04-14 ENCOUNTER — ANCILLARY PROCEDURE (OUTPATIENT)
Dept: GENERAL RADIOLOGY | Facility: CLINIC | Age: 56
End: 2021-04-14
Attending: STUDENT IN AN ORGANIZED HEALTH CARE EDUCATION/TRAINING PROGRAM
Payer: COMMERCIAL

## 2021-04-14 DIAGNOSIS — Z96.652 HX OF TOTAL KNEE ARTHROPLASTY, LEFT: ICD-10-CM

## 2021-04-14 DIAGNOSIS — M17.12 PRIMARY OSTEOARTHRITIS OF LEFT KNEE: Primary | ICD-10-CM

## 2021-04-14 PROCEDURE — 99024 POSTOP FOLLOW-UP VISIT: CPT | Performed by: STUDENT IN AN ORGANIZED HEALTH CARE EDUCATION/TRAINING PROGRAM

## 2021-04-14 PROCEDURE — 77073 BONE LENGTH STUDIES: CPT | Performed by: RADIOLOGY

## 2021-04-14 PROCEDURE — 73560 X-RAY EXAM OF KNEE 1 OR 2: CPT | Mod: XU | Performed by: RADIOLOGY

## 2021-04-14 NOTE — LETTER
4/14/2021         RE: Lasha Sims  [address] [facility] [address]        Dear Colleague,    Thank you for referring your patient, Lasha Sims, to the [facility] [place]. Please see a copy of my visit note below.        [facility]  Orthopaedic Surgery Consultation by Ton Jackson M.D.    Lasha Sims MRN# [id number]   Age: 55 year old YOB: 1965     Requesting physician: Myesha Carter     Background history:  DX:  1. Spontaneous pneumothorax  2. Hyperlipidemia  3. Hypercholesterolemia  4. Depressive disorder    TREATMENTS:  1. 2004, ACL reconstruction left knee, [facility].  2. 3/12/2021, left total knee replacement, , [facility]          History of Present Illness:     55-year-old male status post ACL reconstruction and varus alignment with pain of left knee most likely due to medial compartmental osteoarthritic changes who underwent left total knee arthroplasty on 3/12/2021.   Patient states that he is doing well.  He is no longer using pain medication except Tylenol as needed.  He is doing his range of motion strengthening exercises under the guidance of physical therapy.  The previous cramping pain in calf has disappeared.  He occasionally experiencing sharp pain in the knee and some tightness in his thigh.  These complaints wax and wane and seem to be activity dependent.  Overall he is making improvements.    Social:   Occupation: Desk job  Living situation: With spouse  Hobbies / Sports: CrossFit, walking dog  Smoking: No  Alcohol: No  Illicit drug use: No         Physical Exam:     EXAMINATION pertinent findings:   PSYCH: Pleasant, healthy-appearing, alert, oriented x3, cooperative. Normal mood and affect.  VITAL SIGNS: There were no vitals taken for this visit.  Reviewed nursing intake notes.   There is no height or weight on file to calculate BMI.  RESP: non labored breathing   ABD:  benign, soft, non-tender, no acute peritoneal findings  SKIN: grossly normal   LYMPHATIC: grossly normal, no adenopathy, no extremity edema  NEURO: grossly normal , no motor deficits  VASCULAR: satisfactory perfusion of all extremities   MUSCULOSKELETAL:   Alignment: Neutral    L knee: Incision is clean, dry and intact.  No signs of infection.  -0-0  . Straight leg raise +. No redness, warmth or skin changes present.  No effusion present. Ligamentously stable in both ML and AP direction.  No signs of DVT.    Left LE:   Thigh and leg compartments soft and compressible   +Quad/TA/GSC/FHL/EHL   SILT DP/SP/Dionne/Saph/Tib nerve distributions   Palpable dorsalis pedis pulse            Data:   All laboratory data reviewed  All imaging studies reviewed by me personally.    XR knee left 4/14/2021:  My interpretation: Status post left total knee arthroplasty.  Adequate positioning and sizing of components.  Adequate fixation.  No signs of fractures or immediate postoperative complications.         Assessment and Plan:   Assessment:  55-year-old male status post ACL reconstruction and varus alignment with pain of left knee most likely due to medial compartmental osteoarthritic changes who underwent left total knee arthroplasty on 3/12/2021.  Recovering well.      Plan:  I extensively discussed my findings with the patient.  We discussed that he is doing very well for his 5-week postoperative visit.  It is my expectation that his range of motion and will increase over time as well as the residual complaints that he is experiencing will subside.  Is my recommendation to continue physical therapy for range of motion strengthening exercises.  Patient understands and agrees to treatment plan as set forth.  We will follow-up with him at the 1 year postoperative visit with x-rays or sooner if there are any additional questions or concerns.    Ton Jackson MD, PhD     Adult Reconstruction  [facility]  [place] Department of Orthopaedic Surgery  Pager [phone]

## 2021-04-14 NOTE — PROGRESS NOTES
[facility]  Orthopaedic Surgery Consultation by Ton Jackson M.D.    Lasha Sims MRN# [id number]   Age: 55 year old YOB: 1965     Requesting physician: Myesha Carter     Background history:  DX:  1. Spontaneous pneumothorax  2. Hyperlipidemia  3. Hypercholesterolemia  4. Depressive disorder    TREATMENTS:  1. 2004, ACL reconstruction left knee, [facility].  2. 3/12/2021, left total knee replacement, , [facility]          History of Present Illness:     55-year-old male status post ACL reconstruction and varus alignment with pain of left knee most likely due to medial compartmental osteoarthritic changes who underwent left total knee arthroplasty on 3/12/2021.   Patient states that he is doing well.  He is no longer using pain medication except Tylenol as needed.  He is doing his range of motion strengthening exercises under the guidance of physical therapy.  The previous cramping pain in calf has disappeared.  He occasionally experiencing sharp pain in the knee and some tightness in his thigh.  These complaints wax and wane and seem to be activity dependent.  Overall he is making improvements.    Social:   Occupation: Desk job  Living situation: With spouse  Hobbies / Sports: CrossFit, walking dog  Smoking: No  Alcohol: No  Illicit drug use: No         Physical Exam:     EXAMINATION pertinent findings:   PSYCH: Pleasant, healthy-appearing, alert, oriented x3, cooperative. Normal mood and affect.  VITAL SIGNS: There were no vitals taken for this visit.  Reviewed nursing intake notes.   There is no height or weight on file to calculate BMI.  RESP: non labored breathing   ABD: benign, soft, non-tender, no acute peritoneal findings  SKIN: grossly normal   LYMPHATIC: grossly normal, no adenopathy, no extremity edema  NEURO: grossly normal , no motor deficits  VASCULAR: satisfactory perfusion of all extremities   MUSCULOSKELETAL:    Alignment: Neutral    L knee: Incision is clean, dry and intact.  No signs of infection.  -0-0  . Straight leg raise +. No redness, warmth or skin changes present.  No effusion present. Ligamentously stable in both ML and AP direction.  No signs of DVT.    Left LE:   Thigh and leg compartments soft and compressible   +Quad/TA/GSC/FHL/EHL   SILT DP/SP/Dionne/Saph/Tib nerve distributions   Palpable dorsalis pedis pulse            Data:   All laboratory data reviewed  All imaging studies reviewed by me personally.    XR knee left 4/14/2021:  My interpretation: Status post left total knee arthroplasty.  Adequate positioning and sizing of components.  Adequate fixation.  No signs of fractures or immediate postoperative complications.         Assessment and Plan:   Assessment:  55-year-old male status post ACL reconstruction and varus alignment with pain of left knee most likely due to medial compartmental osteoarthritic changes who underwent left total knee arthroplasty on 3/12/2021.  Recovering well.      Plan:  I extensively discussed my findings with the patient.  We discussed that he is doing very well for his 5-week postoperative visit.  It is my expectation that his range of motion and will increase over time as well as the residual complaints that he is experiencing will subside.  Is my recommendation to continue physical therapy for range of motion strengthening exercises.  Patient understands and agrees to treatment plan as set forth.  We will follow-up with him at the 1 year postoperative visit with x-rays or sooner if there are any additional questions or concerns.    Ton Jackson MD, PhD     Adult Reconstruction  [facility] Department of Orthopaedic Surgery  Pager [phone]

## 2021-05-12 ENCOUNTER — ONCOLOGY VISIT (OUTPATIENT)
Dept: ONCOLOGY | Facility: CLINIC | Age: 56
End: 2021-05-12
Attending: INTERNAL MEDICINE
Payer: COMMERCIAL

## 2021-05-12 VITALS
BODY MASS INDEX: 24.3 KG/M2 | OXYGEN SATURATION: 100 % | TEMPERATURE: 96.6 F | WEIGHT: 164.1 LBS | RESPIRATION RATE: 16 BRPM | SYSTOLIC BLOOD PRESSURE: 121 MMHG | HEIGHT: 69 IN | DIASTOLIC BLOOD PRESSURE: 76 MMHG | HEART RATE: 80 BPM

## 2021-05-12 DIAGNOSIS — E78.00 HYPERCHOLESTEREMIA: Primary | ICD-10-CM

## 2021-05-12 DIAGNOSIS — D70.8 OTHER NEUTROPENIA (H): ICD-10-CM

## 2021-05-12 LAB
BASOPHILS # BLD AUTO: 0 10E9/L (ref 0–0.2)
BASOPHILS NFR BLD AUTO: 0.8 %
COPATH REPORT: NORMAL
DIFFERENTIAL METHOD BLD: ABNORMAL
EOSINOPHIL # BLD AUTO: 0.1 10E9/L (ref 0–0.7)
EOSINOPHIL NFR BLD AUTO: 2.2 %
ERYTHROCYTE [DISTWIDTH] IN BLOOD BY AUTOMATED COUNT: 12.5 % (ref 10–15)
HCT VFR BLD AUTO: 42.8 % (ref 40–53)
HGB BLD-MCNC: 14.2 G/DL (ref 13.3–17.7)
IMM GRANULOCYTES # BLD: 0 10E9/L (ref 0–0.4)
IMM GRANULOCYTES NFR BLD: 0.3 %
LYMPHOCYTES # BLD AUTO: 1.5 10E9/L (ref 0.8–5.3)
LYMPHOCYTES NFR BLD AUTO: 41.6 %
MCH RBC QN AUTO: 29.4 PG (ref 26.5–33)
MCHC RBC AUTO-ENTMCNC: 33.2 G/DL (ref 31.5–36.5)
MCV RBC AUTO: 89 FL (ref 78–100)
MONOCYTES # BLD AUTO: 0.3 10E9/L (ref 0–1.3)
MONOCYTES NFR BLD AUTO: 9 %
NEUTROPHILS # BLD AUTO: 1.6 10E9/L (ref 1.6–8.3)
NEUTROPHILS NFR BLD AUTO: 46.1 %
NRBC # BLD AUTO: 0 10*3/UL
NRBC BLD AUTO-RTO: 0 /100
PLATELET # BLD AUTO: 238 10E9/L (ref 150–450)
RBC # BLD AUTO: 4.83 10E12/L (ref 4.4–5.9)
RETICS # AUTO: 55.5 10E9/L (ref 25–95)
RETICS/RBC NFR AUTO: 1.2 % (ref 0.5–2)
WBC # BLD AUTO: 3.6 10E9/L (ref 4–11)

## 2021-05-12 PROCEDURE — 85060 BLOOD SMEAR INTERPRETATION: CPT | Performed by: PATHOLOGY

## 2021-05-12 PROCEDURE — 85025 COMPLETE CBC W/AUTO DIFF WBC: CPT | Performed by: PATHOLOGY

## 2021-05-12 PROCEDURE — 99205 OFFICE O/P NEW HI 60 MIN: CPT | Performed by: INTERNAL MEDICINE

## 2021-05-12 PROCEDURE — 85045 AUTOMATED RETICULOCYTE COUNT: CPT | Performed by: PATHOLOGY

## 2021-05-12 PROCEDURE — G0463 HOSPITAL OUTPT CLINIC VISIT: HCPCS

## 2021-05-12 PROCEDURE — 36415 COLL VENOUS BLD VENIPUNCTURE: CPT | Performed by: PATHOLOGY

## 2021-05-12 ASSESSMENT — MIFFLIN-ST. JEOR: SCORE: 1573.11

## 2021-05-12 ASSESSMENT — PAIN SCALES - GENERAL: PAINLEVEL: NO PAIN (1)

## 2021-05-12 NOTE — LETTER
"    5/12/2021         RE: Lasha Sims  [address] [facility] [address]        Dear Colleague,    Thank you for referring your patient, Lasha Sims, to the [facility]. Please see a copy of my visit note below.    Hematology Clinic consult note    Reasons for Consult: -neutropenia    History of Present Illness: Mr. Sims is a 55 year old man referred for neutropenia. He was noted to have mild neutropenia, ANC 1.3, with normal hemoglobin and platelets on 3/9/21.  This was one day after an ED visit for palpitations, chest pain, light headed and nausea. He went ahead and had a knee replacement surgery 3 days later, uneventful.  Looking back through his records, he has had intermittent mild leukopenia, sometimes with neutropenia since at least 2010.  He has not had problems with frequent infections.  He has had some evaluation for this in the past.  In 2013 he had a negative NEELIMA, 2017 - HIV negative hepatitis C, 10/9/2018 B12 491 ferritin 98 iron 121 TIBC 268 and saturation 36%, hemoglobin 14.4, white count 3.4, platelets 220 8K, ANC 1.8.  The one time the neutrophil count was more significantly low was on 7/24/2019 when he had possible Lyme disease versus anaplasmosis.    Today he says that he has had some \"malaise\" for many months.  He says he has constant lightheadedness which he describes is not dizzy.  He says he does not have any sensation of his eyes going dark if he changes position from sitting to standing, he does not feel his heart racing.  However at times, especially when he exercises, his heart rate is going up above 70 and staying there as long as 15 minutes after he stopped activity.  He is going to be seeing a cardiologist.  He has not had any fevers, chills, sweats, anorexia, lymphadenopathy.  He thinks he may have lost about 5 pounds unintentionally over in the past year.  He says he does feel like he needs to lie down in the afternoon, especially when he feels " "lightheaded and then that sensation goes away.    He also complains of a sore throat.  He says when he wakes up in the morning his throat feels fine but then as the day progresses his throat starts to feel sore and he has a difficult time speaking loudly.  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  He says he does not have any sinus congestion, postnasal drip, or other allergy symptoms.  He thinks he may be sometimes has some reflux and occasionally takes Tums.    Past Medical History:  - depression and anxiety  -h/o sinus tachycardia  - osteoarthritis  -H/o spontaneous pneumothorax, s/p thoracoscopy 2010  -h/o L ACL repair 2003  -s/p L knee arthroplasty  -s/p hernia repair    Medications:  -Acetaminophen, aspirin, diazepam, fluoxetine, ibuprofen, MiraLAX, Senokot, simvastatin.  Is been on these medications he thinks for at least 10 years.  He does not take any herbal supplements.  He does take some protein powder-[employer] gold standard.  He has been taking less since he had his knee replaced in March.    Family History: mother-high cholesterol, father-myeloma age 60, siblings-brother prostate cancer age 50, paternal grandfather brain tumor age 60.  2 additional brothers and 1 sister with no health problems.    Social History: smoking-never, alcohol-1-2 drinks per week.  Smokes marijuana on rare occasions.  He is an avid exerciser with CrossFit, weight training, bicycle riding.    Review of systems:  As in HPI.  He had a petechial rash on his chest a while back that was not very pruritic.  His wife had a rash at the same time that was more pruritic.  He does not know of any changes in laundry detergent, etc., but the rash is resolved.  The rest of the > 10 point review of systems was negative.        PHYSICAL EXAMINATION:  /76   Pulse 80   Temp 96.6  F (35.9  C) (Tympanic)   Resp 16   Ht 1.758 m (5' 9.21\")   Wt 74.4 kg (164 lb 1.6 oz)   SpO2 100%   BMI 24.08 kg/m      General appearance: "  Patient is  in no acute distress.     HEENT:  No pallor, icterus, or mucositis.  No thyromegaly.   Lymph nodes:  No cervical, supraclavicular, axillary, or inguinal lymphadenopathy.   Lungs:  Clear to auscultation bilaterally.   Heart:  Regular rate and rhythm; no S3 S4 or murmer.     Abdomen:  Positive bowel sounds, soft and nontender, nondistended.  No hepatomegaly. No splenomegaly appreciated.    Extremities:  No joint swelling or tenderness.  Well-healed total knee replacement incision on his left knee.  No erythema, ulceration, exudate.  no ankle edema.     Skin:  No rash, no petechiae or ecchymoses.      Labs: Reviewed [facility]  1/9/2013 NEELIMA negative, rheumatoid factor negative, myeloperoxidase antibody negative  2/7/2017 HIV negative, hepatitis C negative  10/9/2018 ferritin 98, B12 491, iron 121, TIBC 335, saturation 36%  Results for UZMA CHAVIS (MRN [id number]) as of 5/12/2021 17:21   Ref. Range 1/22/2020 07:13 3/24/2020 09:14 10/20/2020 09:04 3/9/2021 08:59 5/12/2021 08:15   WBC Latest Ref Range: 4.0 - 11.0 10e9/L 3.2 (L) 3.9 (L) 3.5 (L) 3.0 (L) 3.6 (L)   Hemoglobin Latest Ref Range: 13.3 - 17.7 g/dL 13.0 (L) 14.4 14.1 14.3 14.2   Hematocrit Latest Ref Range: 40.0 - 53.0 % 38.4 (L) 43.9 42.3 43.1 42.8   Platelet Count Latest Ref Range: 150 - 450 10e9/L 205 241 232 234 238   RBC Count Latest Ref Range: 4.4 - 5.9 10e12/L 4.37 (L) 4.89 4.77 4.84 4.83   MCV Latest Ref Range: 78 - 100 fl 88 90 89 89 89   MCH Latest Ref Range: 26.5 - 33.0 pg 29.7 29.4 29.6 29.5 29.4   MCHC Latest Ref Range: 31.5 - 36.5 g/dL 33.9 32.8 33.3 33.2 33.2   RDW Latest Ref Range: 10.0 - 15.0 % 12.3 12.3 12.6 12.5 12.5   Diff Method Unknown Automated Method Automated Method Automated Method Automated Method Automated Method   % Neutrophils Latest Units: % 45.3 53.9 50.0 43.4 46.1   % Lymphocytes Latest Units: % 41.4 36.0 38.8 46.1 41.6   % Monocytes Latest Units: % 9.0 7.5 7.8 7.8 9.0   % Eosinophils Latest  Units: % 3.1 1.3 1.7 1.7 2.2   % Basophils Latest Units: % 0.9 0.8 1.1 0.7 0.8   % Immature Granulocytes Latest Units: % 0.3 0.5 0.6 0.3 0.3   Nucleated RBCs Latest Ref Range: 0 /100     0   Absolute Neutrophil Latest Ref Range: 1.6 - 8.3 10e9/L 1.5 (L) 2.1 1.7 1.3 (L) 1.6   Absolute Lymphocytes Latest Ref Range: 0.8 - 5.3 10e9/L 1.3 1.4 1.4 1.4 1.5   Absolute Monocytes Latest Ref Range: 0.0 - 1.3 10e9/L 0.3 0.3 0.3 0.2 0.3   Absolute Eosinophils Latest Ref Range: 0.0 - 0.7 10e9/L 0.1 0.1 0.1 0.1 0.1   Absolute Basophils Latest Ref Range: 0.0 - 0.2 10e9/L 0.0 0.0 0.0 0.0 0.0   Abs Immature Granulocytes Latest Ref Range: 0 - 0.4 10e9/L 0.0 0.0 0.0 0.0 0.0   Absolute Nucleated RBC Unknown     0.0   % Retic Latest Ref Range: 0.5 - 2.0 %     1.2   Absolute Retic Latest Ref Range: 25 - 95 10e9/L     55.5   Patient Name: LEON CHAVIS   MR#: [id number]   Specimen #: [id number]   Collected: 5/12/2021   Received: 5/12/2021   Reported: 5/12/2021 17:14   Ordering Phy(s): RADU MARTINEZ     For improved result formatting, select 'View Enhanced Report Format' under    Linked Documents section.     TEST(S):   Blood Smear Morphology     FINAL DIAGNOSIS:   Peripheral Blood Smear:   -Slight leukopenia with low-normal granulocyte count, no overt dysplasia   - Reactive - appearing lymphocytes     I have personally reviewed all specimens and/or slides, including the   listed special stains, and used them   with my medical judgment to determine the final diagnosis.     Electronically signed out by:     Isaac Pittman M.D.,[facility]         Assessment and Recommendation: -This is a 55-year-old man with longstanding intermittent mild leukopenia and occasionlal mild neutropenia.  Most of the time, when he has had a CBC the total white count has been low, but all of the subsets of the different types of white blood cells have been normal.  He is only been neutropenic on a couple of occasions, and it has been mild.  Differential  "diagnosis of a mild intermittent neutropenia includes constitutional, medications, suppression by chronic infection or acute infection, autoimmune.    None of his medications are very likely candidates of suppressing the white blood cell count, however every individual is different than it is possible that one of them is slightly suppressing his blood count.  Is not severe enough to warrant trial of being off his various medications.  His counts of been lowest when seen in the ED for an acute issue-most recently in March 2021, and then in 2019 when he had possible Lyme or anaplasmosis.  Its not uncommon for patients to have their white count suppressed somewhat when they have an acute infection.  He does not have a history of frequent bacterial infections or fungal infections are not concerned that his neutrophil counts has been slightly low on a couple of occasions.  He does not have HIV or hepatitis C as a cause for suppression of his white count.  He may have an autoimmune leukopenia, however usually in that case the neutropenia is more severe and consistently low, and he does not have evidence of other autoimmune disorders.  I suspect this is just constitutional.  There is no indication for a bone marrow biopsy or for any intervention.    I cannot put together his symptoms of feeling \"lightheaded,\"  or the sore throat with dysphonia, with a chronic mild leukopenia.  He already has an appointment with a cardiologist.  His primary care physician may want to consider referring him to ENT regarding a sore throat and dysphonia.    I recommend against routine complete blood counts when he is feeling well, but instead check only if he has a clinical indication to check the white count.    He does not need routine follow-up in hematology clinic.    Time: I spent a total of 60 minutes on the day of the visit. Please see the note for further information on patient assessment and treatment.       Lyly Tian, " MD  Hematology          Again, thank you for allowing me to participate in the care of your patient.        Sincerely,        Lyly Tian MD

## 2021-05-12 NOTE — PROGRESS NOTES
"Hematology Clinic consult note    Reasons for Consult: -neutropenia    History of Present Illness: Mr. Sims is a 55 year old man referred for neutropenia. He was noted to have mild neutropenia, ANC 1.3, with normal hemoglobin and platelets on 3/9/21.  This was one day after an ED visit for palpitations, chest pain, light headed and nausea. He went ahead and had a knee replacement surgery 3 days later, uneventful.  Looking back through his records, he has had intermittent mild leukopenia, sometimes with neutropenia since at least 2010.  He has not had problems with frequent infections.  He has had some evaluation for this in the past.  In 2013 he had a negative NEELIMA, 2017 - HIV negative hepatitis C, 10/9/2018 B12 491 ferritin 98 iron 121 TIBC 268 and saturation 36%, hemoglobin 14.4, white count 3.4, platelets 220 8K, ANC 1.8.  The one time the neutrophil count was more significantly low was on 7/24/2019 when he had possible Lyme disease versus anaplasmosis.    Today he says that he has had some \"malaise\" for many months.  He says he has constant lightheadedness which he describes is not dizzy.  He says he does not have any sensation of his eyes going dark if he changes position from sitting to standing, he does not feel his heart racing.  However at times, especially when he exercises, his heart rate is going up above 70 and staying there as long as 15 minutes after he stopped activity.  He is going to be seeing a cardiologist.  He has not had any fevers, chills, sweats, anorexia, lymphadenopathy.  He thinks he may have lost about 5 pounds unintentionally over in the past year.  He says he does feel like he needs to lie down in the afternoon, especially when he feels lightheaded and then that sensation goes away.    He also complains of a sore throat.  He says when he wakes up in the morning his throat feels fine but then as the day progresses his throat starts to feel sore and he has a difficult time speaking " "loudly.  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  He says he does not have any sinus congestion, postnasal drip, or other allergy symptoms.  He thinks he may be sometimes has some reflux and occasionally takes Tums.    Past Medical History:  - depression and anxiety  -h/o sinus tachycardia  - osteoarthritis  -H/o spontaneous pneumothorax, s/p thoracoscopy 2010  -h/o L ACL repair 2003  -s/p L knee arthroplasty  -s/p hernia repair    Medications:  -Acetaminophen, aspirin, diazepam, fluoxetine, ibuprofen, MiraLAX, Senokot, simvastatin.  Is been on these medications he thinks for at least 10 years.  He does not take any herbal supplements.  He does take some protein powder-StudySoup gold standard.  He has been taking less since he had his knee replaced in March.    Family History: mother-high cholesterol, father-myeloma age 60, siblings-brother prostate cancer age 50, paternal grandfather brain tumor age 60.  2 additional brothers and 1 sister with no health problems.    Social History: smoking-never, alcohol-1-2 drinks per week.  Smokes marijuana on rare occasions.  He is an avid exerciser with CrossFit, weight training, bicycle riding.    Review of systems:  As in HPI.  He had a petechial rash on his chest a while back that was not very pruritic.  His wife had a rash at the same time that was more pruritic.  He does not know of any changes in laundry detergent, etc., but the rash is resolved.  The rest of the > 10 point review of systems was negative.        PHYSICAL EXAMINATION:  /76   Pulse 80   Temp 96.6  F (35.9  C) (Tympanic)   Resp 16   Ht 1.758 m (5' 9.21\")   Wt 74.4 kg (164 lb 1.6 oz)   SpO2 100%   BMI 24.08 kg/m      General appearance:  Patient is  in no acute distress.     HEENT:  No pallor, icterus, or mucositis.  No thyromegaly.   Lymph nodes:  No cervical, supraclavicular, axillary, or inguinal lymphadenopathy.   Lungs:  Clear to auscultation bilaterally.   Heart:  Regular " rate and rhythm; no S3 S4 or murmer.     Abdomen:  Positive bowel sounds, soft and nontender, nondistended.  No hepatomegaly. No splenomegaly appreciated.    Extremities:  No joint swelling or tenderness.  Well-healed total knee replacement incision on his left knee.  No erythema, ulceration, exudate.  no ankle edema.     Skin:  No rash, no petechiae or ecchymoses.      Labs: Reviewed today  [facility]  1/9/2013 NEELIMA negative, rheumatoid factor negative, myeloperoxidase antibody negative  2/7/2017 HIV negative, hepatitis C negative  10/9/2018 ferritin 98, B12 491, iron 121, TIBC 335, saturation 36%  Results for UZMA CHAVIS (MRN [id number]) as of 5/12/2021 17:21   Ref. Range 1/22/2020 07:13 3/24/2020 09:14 10/20/2020 09:04 3/9/2021 08:59 5/12/2021 08:15   WBC Latest Ref Range: 4.0 - 11.0 10e9/L 3.2 (L) 3.9 (L) 3.5 (L) 3.0 (L) 3.6 (L)   Hemoglobin Latest Ref Range: 13.3 - 17.7 g/dL 13.0 (L) 14.4 14.1 14.3 14.2   Hematocrit Latest Ref Range: 40.0 - 53.0 % 38.4 (L) 43.9 42.3 43.1 42.8   Platelet Count Latest Ref Range: 150 - 450 10e9/L 205 241 232 234 238   RBC Count Latest Ref Range: 4.4 - 5.9 10e12/L 4.37 (L) 4.89 4.77 4.84 4.83   MCV Latest Ref Range: 78 - 100 fl 88 90 89 89 89   MCH Latest Ref Range: 26.5 - 33.0 pg 29.7 29.4 29.6 29.5 29.4   MCHC Latest Ref Range: 31.5 - 36.5 g/dL 33.9 32.8 33.3 33.2 33.2   RDW Latest Ref Range: 10.0 - 15.0 % 12.3 12.3 12.6 12.5 12.5   Diff Method Unknown Automated Method Automated Method Automated Method Automated Method Automated Method   % Neutrophils Latest Units: % 45.3 53.9 50.0 43.4 46.1   % Lymphocytes Latest Units: % 41.4 36.0 38.8 46.1 41.6   % Monocytes Latest Units: % 9.0 7.5 7.8 7.8 9.0   % Eosinophils Latest Units: % 3.1 1.3 1.7 1.7 2.2   % Basophils Latest Units: % 0.9 0.8 1.1 0.7 0.8   % Immature Granulocytes Latest Units: % 0.3 0.5 0.6 0.3 0.3   Nucleated RBCs Latest Ref Range: 0 /100     0   Absolute Neutrophil Latest Ref Range: 1.6 - 8.3 10e9/L  1.5 (L) 2.1 1.7 1.3 (L) 1.6   Absolute Lymphocytes Latest Ref Range: 0.8 - 5.3 10e9/L 1.3 1.4 1.4 1.4 1.5   Absolute Monocytes Latest Ref Range: 0.0 - 1.3 10e9/L 0.3 0.3 0.3 0.2 0.3   Absolute Eosinophils Latest Ref Range: 0.0 - 0.7 10e9/L 0.1 0.1 0.1 0.1 0.1   Absolute Basophils Latest Ref Range: 0.0 - 0.2 10e9/L 0.0 0.0 0.0 0.0 0.0   Abs Immature Granulocytes Latest Ref Range: 0 - 0.4 10e9/L 0.0 0.0 0.0 0.0 0.0   Absolute Nucleated RBC Unknown     0.0   % Retic Latest Ref Range: 0.5 - 2.0 %     1.2   Absolute Retic Latest Ref Range: 25 - 95 10e9/L     55.5   Patient Name: LEON CHAVIS   MR#: [id number]   Specimen #: [id number]   Collected: 5/12/2021   Received: 5/12/2021   Reported: 5/12/2021 17:14   Ordering Phy(s): RADU MARTINEZ     For improved result formatting, select 'View Enhanced Report Format' under    Linked Documents section.     TEST(S):   Blood Smear Morphology     FINAL DIAGNOSIS:   Peripheral Blood Smear:   -Slight leukopenia with low-normal granulocyte count, no overt dysplasia   - Reactive - appearing lymphocytes     I have personally reviewed all specimens and/or slides, including the   listed special stains, and used them   with my medical judgment to determine the final diagnosis.     Electronically signed out by:     Isaac Pittman M.D.,[facility]         Assessment and Recommendation: -This is a 55-year-old man with longstanding intermittent mild leukopenia and occasionlal mild neutropenia.  Most of the time, when he has had a CBC the total white count has been low, but all of the subsets of the different types of white blood cells have been normal.  He is only been neutropenic on a couple of occasions, and it has been mild.  Differential diagnosis of a mild intermittent neutropenia includes constitutional, medications, suppression by chronic infection or acute infection, autoimmune.    None of his medications are very likely candidates of suppressing the white blood cell count,  "however every individual is different than it is possible that one of them is slightly suppressing his blood count.  Is not severe enough to warrant trial of being off his various medications.  His counts of been lowest when seen in the ED for an acute issue-most recently in March 2021, and then in 2019 when he had possible Lyme or anaplasmosis.  Its not uncommon for patients to have their white count suppressed somewhat when they have an acute infection.  He does not have a history of frequent bacterial infections or fungal infections are not concerned that his neutrophil counts has been slightly low on a couple of occasions.  He does not have HIV or hepatitis C as a cause for suppression of his white count.  He may have an autoimmune leukopenia, however usually in that case the neutropenia is more severe and consistently low, and he does not have evidence of other autoimmune disorders.  I suspect this is just constitutional.  There is no indication for a bone marrow biopsy or for any intervention.    I cannot put together his symptoms of feeling \"lightheaded,\"  or the sore throat with dysphonia, with a chronic mild leukopenia.  He already has an appointment with a cardiologist.  His primary care physician may want to consider referring him to ENT regarding a sore throat and dysphonia.    I recommend against routine complete blood counts when he is feeling well, but instead check only if he has a clinical indication to check the white count.    He does not need routine follow-up in hematology clinic.    Time: I spent a total of 60 minutes on the day of the visit. Please see the note for further information on patient assessment and treatment.       Lyly Tian MD  Hematology      "

## 2021-05-12 NOTE — NURSING NOTE
"Oncology Rooming Note    May 12, 2021 6:59 AM   Lasha Sims is a 55 year old male who presents for:    Chief Complaint   Patient presents with     Oncology Clinic Visit     Pt is here for a New Eval for Neutropenia     Initial Vitals: Blood Pressure 121/76   Pulse 80   Temperature 96.6  F (35.9  C) (Tympanic)   Respiration 16   Height 1.758 m (5' 9.21\")   Weight 74.4 kg (164 lb 1.6 oz)   Oxygen Saturation 100%   Body Mass Index 24.08 kg/m   Estimated body mass index is 24.08 kg/m  as calculated from the following:    Height as of this encounter: 1.758 m (5' 9.21\").    Weight as of this encounter: 74.4 kg (164 lb 1.6 oz). Body surface area is 1.91 meters squared.  No Pain (1) Comment: Data Unavailable   No LMP for male patient.  Allergies reviewed: Yes  Medications reviewed: Yes    Medications: Medication refills not needed today.  Pharmacy name entered into [employer]:    [id number] - [place], [place] - [address] AT [facility] [address]  [facility] - [facility], [place] - [facility]    Clinical concerns: none       Jade Carcamo MA            "

## 2021-06-02 ENCOUNTER — RECORDS - HEALTHEAST (OUTPATIENT)
Dept: ADMINISTRATIVE | Facility: CLINIC | Age: 56
End: 2021-06-02

## 2021-06-08 NOTE — PROGRESS NOTES
Chief Complaint: Evaluation of palpitations and atypical chest pain     HPI: Lasha Sims is a 55 year old male with a past medical history of dyslipidemia, major depressive disorder, and a spontaneous pneumothorax who was referred to me for evaluation of palpitations via self-referral. His wife, Divine, was present throughout the consultation.    Briefly, Mr. Sims describes a 3 to 4-year history of palpitations.  Mr. Sims notes that he first noticed these palpitations after exercise.  He has been doing CrossFit in this time and noticed that his palpitations would persist for brief periods after workouts.  This did not bother him until the spring of this year, when he noticed that the palpitations would persist for longer periods after he finished exercising.   At this stage, he was doing the workouts in the morning and the workouts were usually preceded by him drinking 10 to 12 ounces of coffee.  He also has a Woop heart rate monitor which showed that his heart rate was in the 160-170 range after exercise for 10 to 12 minutes. He brought in detailed printouts from his monitor to show me the trends. This was similar to the periods of peak exercise, although he stated that he was at rest. He subsequently had his left knee replaced in March of this year, but had a few episodes of palpitations after the workout even after he decreased the intensity of the workouts.     Mr. Sims also notes some chest pressure associated with the cessation of exercise, but the chest pressure is self-resolving and hasdecreased in frequency to the point that it is almost absent. It does not occur during peak exercise. The chest pressure occurred intermittently after his total knee replacement while he has been sitting. There are no exacerbating or relieving factors.     One particular episode was bad and led Mr. Sims to present to [facility] in early March. At that time, he was concerned that he may have had a  spontaneous pneumothorax, as he has had previously. He chest x-ray was devoid of pneumothoraces and his labs (including troponin) were unremarkable. He had an elevated creatinine to 1.7, which was attributed to his vigorous exercise. He was discharged with OP cardiology follow-up.    At the time of the consultation, he notes an absence of dyspnea at rest, PND, orthopnea, peripheral edema, palpitations, lightheadedness or syncope/exertional syncope. In this time, Mr. Sims feels that his psychiatric comorbidities are extremely well-controlled. He is not having additional stressors.     A comprehensive ROS was done and the details are included above in the HPI.    Past Medical History:  - No prior history of hypertension, CAD, TIA/stroke, vascular aneurysms, PAD  Past Medical History:   Diagnosis Date     Depressive disorder 2001     Generalized anxiety disorder 1990     Inappropriate sinus tachycardia 3/4/2021     Major depression, chronic      Mild hyperlipidemia      Osteoarthritis of left knee      Other neutropenia (H) 2/6/2020     Spontaneous pneumothorax 10/2010    Right side       Past Surgical History:    Past Surgical History:   Procedure Laterality Date     APPENDECTOMY OPEN N/A      ARTHROPLASTY KNEE Left 3/12/2021    Procedure: ARTHROPLASTY, KNEE, TOTAL LEFT;  Surgeon: Ton Jackson MD;  Location: [facility] OR     ARTHROSCOPY KNEE Left 3/12/2021    Procedure: ARTHROSCOPY, KNEE LEFT;  Surgeon: Ton Jackson MD;  Location: [facility] OR     COLONOSCOPY  2017     HERNIA REPAIR Left      KNEE SURGERY Left 2003    ACL repair and partioal lateral manisectomy     LUNG SURGERY       ORTHOPEDIC SURGERY  2003    ACL repair left     OR HAND/FINGER SURGERY UNLISTED       THORACOSCOPY Right 10/19/2010    for pneumothorax. upper lobe wedge resection       Drug History:  Home cardiac meds: ASA 81 mg q24h, simvastatin 10 mg q24h   Current Outpatient Medications   Medication Sig Dispense Refill     FLUoxetine (PROZAC) 20 MG  capsule Take 1 capsule (20 mg) by mouth daily (Patient taking differently: Take 40 mg by mouth At Bedtime ) 90 capsule 3     ibuprofen (ADVIL/MOTRIN) 200 MG tablet Take 600-800 mg by mouth every 8 hours as needed for mild pain       simvastatin (ZOCOR) 10 MG tablet Take 1 tablet (10 mg) by mouth At Bedtime 90 tablet 3     acetaminophen (TYLENOL) 325 MG tablet Take 2 tablets (650 mg) by mouth every 4 hours as needed for other (mild pain) 100 tablet 0     acetaminophen (TYLENOL) 500 MG tablet Take 500 mg by mouth every 8 hours as needed for mild pain or fever        aspirin 81 MG EC tablet Take 1 tablet (81 mg) by mouth 2 times daily 60 tablet 0     diazepam (VALIUM) 5 MG tablet Take 1 tablet (5 mg) by mouth every 6 hours as needed for anxiety 20 tablet 0     ibuprofen (ADVIL/MOTRIN) 600 MG tablet Take 1 tablet (600 mg) by mouth every 6 hours as needed for pain (mild) 30 tablet 0     oxyCODONE (ROXICODONE) 5 MG tablet Take 1-2 tablets (5-10 mg) by mouth every 4 hours as needed for pain (Moderate to Severe) 40 tablet 0     polyethylene glycol (MIRALAX) 17 g packet Take 17 g by mouth daily 7 packet 0     senna-docusate (SENOKOT-S/PERICOLACE) 8.6-50 MG tablet Take 1-2 tablets by mouth 2 times daily Take while on oral narcotics to prevent or treat constipation. 30 tablet 0         Family History:  - No family history of sudden cardiac death, premature CAD, valvular disorders  Family History   Problem Relation Age of Onset     Hyperlipidemia Mother      Multiple myeloma Father 60     Other Cancer Father         multiple myloma     Prostate Cancer Brother      Brain Cancer Paternal Grandfather 60     Depression Daughter        Social History:    Social History     Tobacco Use     Smoking status: Never Smoker     Smokeless tobacco: Never Used     Tobacco comment: na   Substance Use Topics     Alcohol use: Yes     Frequency: Monthly or less       No Known Allergies      Physical Examination:  Vitals: /71 (BP Location:  Left arm, Patient Position: Sitting)   Pulse 69   Wt 74.7 kg (164 lb 9.6 oz)   SpO2 99%   BMI 24.16 kg/m    BMI= Body mass index is 24.16 kg/m .    GENERAL: Healthy, alert and no distress  Eyes: No xanthelasmas.  NECK: No palpable neck masses/goitre and no evidence of retrosternal goitre.   ENT: Moist mucosal membranes.  RESPIRATORY: No signs of resp distress. Lungs CTAB.  CARDIOVASCULAR:  No JVD, regular, normal S1+S2 without added sounds or murmurs.  ABDOMEN: ND, soft, non-tender, normal bowel sounds.  EXTREMITIES: Warm, well-perfused, no edema. Left knee with well-healed scar.  NEUROLOGY: GCS 15/15, no focal deficits.  VASC: No carotid bruits. Carotid, radial, brachial, popliteal, dorsalis pedis and posterior tibialis pulses are normal in volumes and symmetric bilaterally.   SKIN: No ecchymoses, no rashes.  PSYCH: Cooperative, pleasant affect.       Investigations:  I personally viewed and interpreted the following investigations:    Labs:    CBC RESULTS:  Lab Results   Component Value Date    WBC 3.6 (L) 05/12/2021    RBC 4.83 05/12/2021    HGB 14.2 05/12/2021    HCT 42.8 05/12/2021    MCV 89 05/12/2021    MCH 29.4 05/12/2021    MCHC 33.2 05/12/2021    RDW 12.5 05/12/2021     05/12/2021       CMP RESULTS:  Lab Results   Component Value Date     03/11/2021    POTASSIUM 3.9 03/11/2021    CHLORIDE 103 03/11/2021    CO2 34 (H) 03/11/2021    ANIONGAP 2 (L) 03/11/2021    GLC 57 (L) 03/11/2021    BUN 18 03/11/2021    CR 0.97 03/11/2021    GFRESTIMATED 87 03/11/2021    GFRESTBLACK >90 03/11/2021    ORALIA 9.2 03/11/2021    BILITOTAL 0.8 10/20/2020    ALBUMIN 4.1 10/20/2020    ALKPHOS 58 10/20/2020    ALT 34 10/20/2020    AST 4 10/20/2020      LIPIDS:  Lab Results   Component Value Date    CHOL 212 10/20/2020     Lab Results   Component Value Date    HDL 67 10/20/2020     Lab Results   Component Value Date     10/20/2020     Lab Results   Component Value Date    TRIG 104 10/20/2020     No results  found for: PEPEHDJAGDISH    Recent Tests:     Electrocardiogram (06/09/2021):  NSR with HR of 67, nl axis, nl intervals, no ST changes.     Outside Investigations:  TTE 10/2017 ([facility]):    Findings  Left Ventricle    The left ventricular systolic function is low normal, estimated LVEF 50-55%.    The left ventricle is normal size.    Left ventricular wall motion is grossly normal however, endocardial  definition is limited.    There is no left ventricular hypertrophy.  Right Ventricle    The right ventricular cavity size is mildly enlarged.    Probably normal right ventricular systolic function.  Diastolic Function/Strain    The left ventricular diastolic function is normal.    Assessment and Plan:   Lasha Sims is a 55 year old male with a past medical history of depression (well-controlled), dyslipidemia, and a spontaneous pneumothorax who presented to me for evaluation of palpitations.    This pleasant gentleman appears to have palpitations associated with high-level endurance and resistance exercise. His history lacks high-risk features such as exertional syncope, persistent dizziness, structural heart disease, and a family history of arrhythmic disorders.  Despite his meticulous tracking with his heart rate monitor, the nature of his rhythm during his episodes has not yet been characterized.  In view of this, I will seek Ziopatch monitoring to characterize his rhythm.  I have also advised him to seek copious hydration prior to exercise and limit his caffeine intake, where possible.  Finally, to rule out structural heart disease as the etiology of his symptoms, I will seek a follow-up echocardiogram.  He had no structural heart disease on his prior echocardiogram in 2017, but he notes that his symptom frequency has increased markedly. Given that his chest pressure symptoms are characterized as non-anginal and have almost completely resolved, there is no indication for an ischemic work-up at this stage.      Problems:  1. Palpitations  2. Non-anginal chest pain  3. Dyslipidemia  4. ASCVD 3.4%   5. Generalized anxiety disorder  6. Major depression  7. Prior spontaneous pneumothorax    Plan:  - Ziopatch x 14 days to characterize rhythm during episodes of palpitations  - TTE to re-evaluate for structural heart disease in view of increasing symptom frequency  - Continue simvastatin 10 mg q24h   - RTC 2-3 months after testing     Chart review time: 20  Visit time: 40  Total time spent: 60  >50% of this 60-minute visit were spent with the patient on in-person counseling and discussion regarding palpitations.     Jose Mitchell [facility], [place]    Cardiovascular Division  Pager [phone]    CC  Patient Care Team:  Pal Wilks MD as PCP - General (Internal Medicine)  Bobby Underwood MD as MD (Orthopedics)  Bobby Underwood MD as Assigned Musculoskeletal Provider  Ton Jackson MD as MD (Orthopaedic Surgery)  Pal Wilks MD as Assigned PCP  Lyly Tian MD as Assigned Cancer Care Provider

## 2021-06-09 ENCOUNTER — OFFICE VISIT (OUTPATIENT)
Dept: CARDIOLOGY | Facility: CLINIC | Age: 56
End: 2021-06-09
Payer: COMMERCIAL

## 2021-06-09 VITALS
BODY MASS INDEX: 24.16 KG/M2 | DIASTOLIC BLOOD PRESSURE: 71 MMHG | SYSTOLIC BLOOD PRESSURE: 113 MMHG | OXYGEN SATURATION: 99 % | WEIGHT: 164.6 LBS | HEART RATE: 69 BPM

## 2021-06-09 DIAGNOSIS — R00.2 PALPITATIONS: Primary | ICD-10-CM

## 2021-06-09 DIAGNOSIS — E78.00 HYPERCHOLESTEREMIA: ICD-10-CM

## 2021-06-09 PROCEDURE — 99205 OFFICE O/P NEW HI 60 MIN: CPT | Mod: 25 | Performed by: STUDENT IN AN ORGANIZED HEALTH CARE EDUCATION/TRAINING PROGRAM

## 2021-06-09 NOTE — PATIENT INSTRUCTIONS
You were seen at the [facility] Cardiology clinic today.   You saw Dr. Jose Mitchell, [facility], [place].    The following is a summary of your office visit today:    1. Heart monitor for 14 days  2. Heart ultrasound  3. Follow-up with me in two months     Nurse contact information:    Cardiology Care Coordinators: Eryn Renee RN and Michelle Castro RN      Phone: [phone]   Fax: [phone]      HOW TO CHECK YOUR BLOOD PRESSURE AT HOME:     Avoid eating, smoking, and exercising for at least 30 minutes before taking a reading.    Be sure you have taken your BP medication at least 2-3 hours before you check it.     Sit quietly for 10 minutes before a reading.     Sit in a chair with your feet flat on the floor. Rest your  arm on a table so that the arm cuff is at the same level as your heart.    Remain still during the reading.    Record your blood pressure and pulse in a log and bring to your next appointment.     If you have had any blood work, imaging or other testing completed we will be in touch within 1-2 weeks regarding the results. If you have any questions, concerns or need to schedule a follow up, please contact us at [phone]. If you are needing refills please contact your pharmacy. For urgent after hour care please call the [facility] at [phone] or the [facility] at [phone] and ask to speak to the on-call Cardiologist.    It was a pleasure meeting with you today. Please let us know if there is anything else we can do for you so that we can be sure you are leaving completely satisfied with your care experience.     Your Cardiology Team at [facility]  RN Care Coordinators: Shlomo CHIN: [place]

## 2021-06-09 NOTE — NURSING NOTE
Lasha Sims's goals for this visit include: Palpitations  He requests these members of his care team be copied on today's visit information:     PCP: Pal Wilks    Referring Provider:  No referring provider defined for this encounter.    /71 (BP Location: Left arm, Patient Position: Sitting)   Pulse 69   Wt 74.7 kg (164 lb 9.6 oz)   SpO2 99%   BMI 24.16 kg/m      Do you need any medication refills at today's visit? None      CHAPO [facility] [place]

## 2021-06-29 ENCOUNTER — ANCILLARY PROCEDURE (OUTPATIENT)
Dept: CARDIOLOGY | Facility: CLINIC | Age: 56
End: 2021-06-29
Attending: STUDENT IN AN ORGANIZED HEALTH CARE EDUCATION/TRAINING PROGRAM
Payer: COMMERCIAL

## 2021-06-29 DIAGNOSIS — R00.2 PALPITATIONS: ICD-10-CM

## 2021-06-29 PROCEDURE — 93245 EXT ECG>7D<15D REC SCAN A/R: CPT | Performed by: INTERNAL MEDICINE

## 2021-06-29 PROCEDURE — 93306 TTE W/DOPPLER COMPLETE: CPT | Performed by: INTERNAL MEDICINE

## 2021-06-29 NOTE — PATIENT INSTRUCTIONS
Patient has been prescribed a ZioPatch holter for 14 days.  Patient was instructed regarding the indication, function, care and prompt return of the ZioPatch holter monitor. The monitor, with S/N [id number],  was placed on the patient with instructions regarding care of the skin, electrodes, and monitor, as well as documentation in the patient diary. Patient demonstrated understanding of this information and agreed to call [employer] with further questions or concerns.

## 2021-08-04 ENCOUNTER — OFFICE VISIT (OUTPATIENT)
Dept: CARDIOLOGY | Facility: CLINIC | Age: 56
End: 2021-08-04
Attending: STUDENT IN AN ORGANIZED HEALTH CARE EDUCATION/TRAINING PROGRAM
Payer: COMMERCIAL

## 2021-08-04 VITALS
WEIGHT: 165 LBS | HEIGHT: 69 IN | DIASTOLIC BLOOD PRESSURE: 74 MMHG | BODY MASS INDEX: 24.44 KG/M2 | OXYGEN SATURATION: 100 % | HEART RATE: 66 BPM | SYSTOLIC BLOOD PRESSURE: 116 MMHG

## 2021-08-04 DIAGNOSIS — R00.2 PALPITATIONS: ICD-10-CM

## 2021-08-04 PROCEDURE — 99215 OFFICE O/P EST HI 40 MIN: CPT | Performed by: STUDENT IN AN ORGANIZED HEALTH CARE EDUCATION/TRAINING PROGRAM

## 2021-08-04 ASSESSMENT — MIFFLIN-ST. JEOR: SCORE: 1560.94

## 2021-08-04 NOTE — PATIENT INSTRUCTIONS
You were seen at the [facility] Cardiology clinic today.   You saw Dr. Jose Mitchell, [facility], [place], [place].    The following is a summary of your office visit today:    Return to clinic as needed    Nurse contact information:    Cardiology Care Coordinators: Eryn Renee RN and Michelle Castro RN      Phone: [phone]   Fax: [phone]      HOW TO CHECK YOUR BLOOD PRESSURE AT HOME:     Avoid eating, smoking, and exercising for at least 30 minutes before taking a reading.    Be sure you have taken your BP medication at least 2-3 hours before you check it.     Sit quietly for 10 minutes before a reading.     Sit in a chair with your feet flat on the floor. Rest your  arm on a table so that the arm cuff is at the same level as your heart.    Remain still during the reading.    Record your blood pressure and pulse in a log and bring to your next appointment.     If you have had any blood work, imaging or other testing completed we will be in touch within 1-2 weeks regarding the results. If you have any questions, concerns or need to schedule a follow up, please contact us at [phone]. If you are needing refills please contact your pharmacy. For urgent after hour care please call the [facility] at [phone] or the [facility] at [phone] and ask to speak to the on-call Cardiologist.    It was a pleasure meeting with you today. Please let us know if there is anything else we can do for you so that we can be sure you are leaving completely satisfied with your care experience.     Your Cardiology Team at [facility]  RN Care Coordinators: Shlomo CHIN: [place]

## 2021-08-04 NOTE — PROGRESS NOTES
"Lasha Sims's goals for this visit include: Just the results of the tests.     He requests these members of his care team be copied on today's visit information: PCP        PCP: Pal Wilks    Referring Provider:  Jose Mitchell MD  [address] [place] [address] [address]  [place] [address]    /74 (BP Location: Left arm, Patient Position: Sitting, Cuff Size: Adult Regular)   Pulse 66   Ht 1.74 m (5' 8.5\")   Wt 74.8 kg (165 lb)   SpO2 100%   BMI 24.72 kg/m      Do you need any medication refills at today's visit? No.     Adam Rey, EMT  Clinic Support  [facility]    [phone]    Employed by [facility]        "

## 2021-08-04 NOTE — PROGRESS NOTES
Chief Complaint: Evaluation of palpitations and atypical chest pain     HPI: Lasha Sims is a 55 year old male with a past medical history of dyslipidemia, major depressive disorder, and a spontaneous pneumothorax who was referred to me for evaluation of palpitations via self-referral. His wife, Divine, was present throughout the consultation.    Briefly, Mr. Sims describes a 3 to 4-year history of palpitations.  Mr. Sims notes that he first noticed these palpitations after exercise.  He has been doing [employer] in this time and noticed that his palpitations would persist for brief periods after workouts.  This did not bother him until the spring of this year, when he noticed that the palpitations would persist for longer periods after he finished exercising.   At this stage, he was doing the workouts in the morning and the workouts were usually preceded by him drinking 10 to 12 ounces of coffee.  He also has a Woop heart rate monitor which showed that his heart rate was in the 160-170 range after exercise for 10 to 12 minutes. He brought in detailed printouts from his monitor to show me the trends. This was similar to the periods of peak exercise, although he stated that he was at rest. He subsequently had his left knee replaced in March of this year, but had a few episodes of palpitations after the workout even after he decreased the intensity of the workouts.     Mr. Sims also notes some chest pressure associated with the cessation of exercise, but the chest pressure is self-resolving and hasdecreased in frequency to the point that it is almost absent. It does not occur during peak exercise. The chest pressure occurred intermittently after his total knee replacement while he has been sitting. There are no exacerbating or relieving factors.     One particular episode was bad and led Mr. Sims to present to [facility] in early March. At that time, he was concerned that he may have had a  spontaneous pneumothorax, as he has had previously. He chest x-ray was devoid of pneumothoraces and his labs (including troponin) were unremarkable. He had an elevated creatinine to 1.7, which was attributed to his vigorous exercise. He was discharged with OP cardiology follow-up.    At the time of the consultation, he notes an absence of dyspnea at rest, PND, orthopnea, peripheral edema, palpitations, lightheadedness or syncope/exertional syncope. In this time, Mr. Sims feels that his psychiatric comorbidities are extremely well-controlled. He is not having additional stressors.     A comprehensive ROS was done and the details are included above in the HPI.    Interval History 08/03/2021:  Mr. Sims presented for follow-up of his test results. He had no fluttering after his last visit. He has continued exercise without limitation. No presyncope, syncope, or chest pain.    A comprehensive ROS was done and otherwise negative.     Past Medical History:  - No prior history of hypertension, CAD, TIA/stroke, vascular aneurysms, PAD  Past Medical History:   Diagnosis Date     Depressive disorder 2001     Generalized anxiety disorder 1990     Inappropriate sinus tachycardia 3/4/2021     Major depression, chronic      Mild hyperlipidemia      Osteoarthritis of left knee      Other neutropenia (H) 2/6/2020     Spontaneous pneumothorax 10/2010    Right side       Past Surgical History:    Past Surgical History:   Procedure Laterality Date     APPENDECTOMY OPEN N/A      ARTHROPLASTY KNEE Left 3/12/2021    Procedure: ARTHROPLASTY, KNEE, TOTAL LEFT;  Surgeon: Ton Jackson MD;  [facility]     ARTHROSCOPY KNEE Left 3/12/2021    Procedure: ARTHROSCOPY, KNEE LEFT;  Surgeon: Ton Jackson MD;  [facility]     COLONOSCOPY  2017     HERNIA REPAIR Left      KNEE SURGERY Left 2003    ACL repair and partioal lateral manisectomy     LUNG SURGERY       ORTHOPEDIC SURGERY  2003    ACL repair left     ND HAND/FINGER  SURGERY UNLISTED       THORACOSCOPY Right 10/19/2010    for pneumothorax. upper lobe wedge resection       Drug History:  Home cardiac meds: ASA 81 mg q24h, simvastatin 10 mg q24h   Current Outpatient Medications   Medication Sig Dispense Refill     acetaminophen (TYLENOL) 325 MG tablet Take 2 tablets (650 mg) by mouth every 4 hours as needed for other (mild pain) 100 tablet 0     acetaminophen (TYLENOL) 500 MG tablet Take 500 mg by mouth every 8 hours as needed for mild pain or fever        aspirin 81 MG EC tablet Take 1 tablet (81 mg) by mouth 2 times daily 60 tablet 0     diazepam (VALIUM) 5 MG tablet Take 1 tablet (5 mg) by mouth every 6 hours as needed for anxiety 20 tablet 0     FLUoxetine (PROZAC) 20 MG capsule Take 1 capsule (20 mg) by mouth daily (Patient taking differently: Take 40 mg by mouth At Bedtime ) 90 capsule 3     ibuprofen (ADVIL/MOTRIN) 200 MG tablet Take 600-800 mg by mouth every 8 hours as needed for mild pain       ibuprofen (ADVIL/MOTRIN) 600 MG tablet Take 1 tablet (600 mg) by mouth every 6 hours as needed for pain (mild) 30 tablet 0     oxyCODONE (ROXICODONE) 5 MG tablet Take 1-2 tablets (5-10 mg) by mouth every 4 hours as needed for pain (Moderate to Severe) 40 tablet 0     polyethylene glycol (MIRALAX) 17 g packet Take 17 g by mouth daily 7 packet 0     senna-docusate (SENOKOT-S/PERICOLACE) 8.6-50 MG tablet Take 1-2 tablets by mouth 2 times daily Take while on oral narcotics to prevent or treat constipation. 30 tablet 0     simvastatin (ZOCOR) 10 MG tablet Take 1 tablet (10 mg) by mouth At Bedtime 90 tablet 3         Family History:  - No family history of sudden cardiac death, premature CAD, valvular disorders  Family History   Problem Relation Age of Onset     Hyperlipidemia Mother      Multiple myeloma Father 60     Other Cancer Father         multiple myloma     Prostate Cancer Brother      Brain Cancer Paternal Grandfather 60     Depression Daughter        Social History:    Social  "History     Tobacco Use     Smoking status: Never Smoker     Smokeless tobacco: Never Used     Tobacco comment: na   Substance Use Topics     Alcohol use: Yes       No Known Allergies      Physical Examination:  Vitals: /74 (BP Location: Left arm, Patient Position: Sitting, Cuff Size: Adult Regular)   Pulse 66   Ht 1.74 m (5' 8.5\")   Wt 74.8 kg (165 lb)   SpO2 100%   BMI 24.72 kg/m    BMI= Body mass index is 24.72 kg/m .    GENERAL: Healthy, alert and no distress  Eyes: No xanthelasmas.  NECK: No evidence of retrosternal goitre.   ENT: Moist mucosal membranes.  RESPIRATORY: No signs of resp distress.   CARDIOVASCULAR:  No JVD, regular.  EXTREMITIES: Warm, well-perfused, no edema. Left knee with well-healed scar.  NEUROLOGY: GCS 15/15, no focal deficits.  SKIN: No ecchymoses, no rashes.  PSYCH: Cooperative, pleasant affect.       Investigations:  I personally viewed and interpreted the following investigations:    Labs:    CBC RESULTS:  Lab Results   Component Value Date    WBC 3.6 (L) 05/12/2021    RBC 4.83 05/12/2021    HGB 14.2 05/12/2021    HCT 42.8 05/12/2021    MCV 89 05/12/2021    MCH 29.4 05/12/2021    MCHC 33.2 05/12/2021    RDW 12.5 05/12/2021     05/12/2021       CMP RESULTS:  Lab Results   Component Value Date     03/11/2021    POTASSIUM 3.9 03/11/2021    CHLORIDE 103 03/11/2021    CO2 34 (H) 03/11/2021    ANIONGAP 2 (L) 03/11/2021    GLC 57 (L) 03/11/2021    BUN 18 03/11/2021    CR 0.97 03/11/2021    GFRESTIMATED 87 03/11/2021    GFRESTBLACK >90 03/11/2021    ORALIA 9.2 03/11/2021    BILITOTAL 0.8 10/20/2020    ALBUMIN 4.1 10/20/2020    ALKPHOS 58 10/20/2020    ALT 34 10/20/2020    AST 4 10/20/2020      LIPIDS:  Lab Results   Component Value Date    CHOL 212 10/20/2020     Lab Results   Component Value Date    HDL 67 10/20/2020     Lab Results   Component Value Date     10/20/2020     Lab Results   Component Value Date    TRIG 104 10/20/2020     No results found for: " CHOLHDLRATIO    Recent Tests:     Electrocardiogram (06/09/2021):  NSR with HR of 67, nl axis, nl intervals, no ST changes.     Echocardiogram (06/29/2021):  Global and regional left ventricular function is normal with an EF of 55-60%.  Global right ventricular function is normal.  Mild mitral and tricuspid insufficiency present.  Pulmonary artery systolic pressure is normal.  The inferior vena cava was normal in size with preserved respiratory  variability.  No pericardial effusion is present.    Ambulatory ECG Monitor (10 days):  HR range from ~. One brief run of SVT lasting three beats at 0420 AM. No malignant ventricular arrhythmias.    Outside Investigations:  TTE 10/2017 ([facility]):    Findings  Left Ventricle    The left ventricular systolic function is low normal, estimated LVEF 50-55%.    The left ventricle is normal size.    Left ventricular wall motion is grossly normal however, endocardial  definition is limited.    There is no left ventricular hypertrophy.  Right Ventricle    The right ventricular cavity size is mildly enlarged.    Probably normal right ventricular systolic function.  Diastolic Function/Strain    The left ventricular diastolic function is normal.    Assessment and Plan:   Lasha Sims is a 55 year old male with a past medical history of depression (well-controlled), dyslipidemia, and a spontaneous pneumothorax who presented to me for evaluation of palpitations.    Mr. Sims's ambulatory ECG monitor and echocardiogram revealed an absence of structural heart disease.  While the ambulatoryECG monitor suggested a period of supraventricular tachycardia, this was only 3 beats long and occurred at 4:20 AM, suggesting that it is unlikely to be clinically significant or related to his original presenting complaint.  In any case, the lack of symptoms is reassuring.    Should the symptoms recur, I spoke to Mr. Sims about repeating the ambulatory ECG monitoring.  I also talked to  him about empiric trial of metoprolol titrate.  At this stage he was not interested in either but he is welcome to follow-up with me should the symptoms recur.    Problems:  1. Palpitations  2. Non-anginal chest pain  3. Dyslipidemia  4. ASCVD 3.4%   5. Generalized anxiety disorder  6. Major depression  7. Prior spontaneous pneumothorax  8. Mild mitral regurgitation  9. Mild tricuspid regurgitation    Plan:  - No further cardiac testing is indicated, particularly for mitral/tricuspid regurgitation  - No restrictions on activity   - RTC PRN    Chart review time: 20  Visit time: 20  Total time spent: 40  >50% of this 40-minute visit were spent with the patient on in-person counseling and discussion regarding ECG.    Jose Mitchell [facility] [place]    Cardiovascular Division  Pager [phone]    CC  Patient Care Team:  Pal Wilks MD as PCP - General (Internal Medicine)  Bobby Underwood MD as MD (Orthopedics)  Bobby Underwood MD as Assigned Musculoskeletal Provider  Ton Jackson MD as MD (Orthopaedic Surgery)  Pal Wilks MD as Assigned PCP  Lyly Tian MD as Assigned Cancer Care Provider  Jose Mitchell MD as Assigned Heart and Vascular Provider

## 2021-08-23 DIAGNOSIS — Z98.890 S/P ARTHROSCOPY OF LEFT KNEE: ICD-10-CM

## 2021-08-23 DIAGNOSIS — M17.12 PRIMARY OSTEOARTHRITIS OF LEFT KNEE: Primary | ICD-10-CM

## 2021-08-23 DIAGNOSIS — Z98.890 H/O LEFT KNEE SURGERY: ICD-10-CM

## 2021-08-23 RX ORDER — AMOXICILLIN 500 MG/1
TABLET, FILM COATED ORAL
Qty: 4 TABLET | Refills: 0 | Status: SHIPPED | OUTPATIENT
Start: 2021-08-23 | End: 2021-09-13

## 2021-09-13 DIAGNOSIS — Z98.890 H/O LEFT KNEE SURGERY: ICD-10-CM

## 2021-09-13 DIAGNOSIS — M17.12 PRIMARY OSTEOARTHRITIS OF LEFT KNEE: ICD-10-CM

## 2021-09-13 RX ORDER — AMOXICILLIN 500 MG/1
TABLET, FILM COATED ORAL
Qty: 4 TABLET | Refills: 3 | Status: SHIPPED | OUTPATIENT
Start: 2021-09-13 | End: 2022-02-10

## 2021-09-13 NOTE — TELEPHONE ENCOUNTER
Patient requests a refill of his dental prophylactic antibiotics. Total joint surgery in March 2021. Sent to patient's pharmacy   Chucky Harvey per standing orders, 3 refills attached.

## 2021-10-10 ENCOUNTER — HEALTH MAINTENANCE LETTER (OUTPATIENT)
Age: 56
End: 2021-10-10

## 2021-12-04 ENCOUNTER — HEALTH MAINTENANCE LETTER (OUTPATIENT)
Age: 56
End: 2021-12-04

## 2021-12-07 ENCOUNTER — IMMUNIZATION (OUTPATIENT)
Dept: NURSING | Facility: CLINIC | Age: 56
End: 2021-12-07
Payer: COMMERCIAL

## 2021-12-07 PROCEDURE — 0064A COVID-19,PF,MODERNA (18+ YRS BOOSTER .25ML): CPT

## 2021-12-07 PROCEDURE — 91306 COVID-19,PF,MODERNA (18+ YRS BOOSTER .25ML): CPT

## 2022-02-09 ASSESSMENT — ENCOUNTER SYMPTOMS
DIARRHEA: 0
ARTHRALGIAS: 0
NERVOUS/ANXIOUS: 1
SHORTNESS OF BREATH: 0
PALPITATIONS: 0
FEVER: 0
CHILLS: 0
HEARTBURN: 0
HEMATURIA: 0
WEAKNESS: 0
NAUSEA: 0
COUGH: 0
EYE PAIN: 0
PARESTHESIAS: 0
ABDOMINAL PAIN: 1
FREQUENCY: 0
DIZZINESS: 0
SORE THROAT: 1
MYALGIAS: 0
HEMATOCHEZIA: 0
DYSURIA: 0
CONSTIPATION: 0
JOINT SWELLING: 0
HEADACHES: 0

## 2022-02-09 ASSESSMENT — ANXIETY QUESTIONNAIRES
GAD7 TOTAL SCORE: 6
2. NOT BEING ABLE TO STOP OR CONTROL WORRYING: SEVERAL DAYS
5. BEING SO RESTLESS THAT IT IS HARD TO SIT STILL: NOT AT ALL
7. FEELING AFRAID AS IF SOMETHING AWFUL MIGHT HAPPEN: SEVERAL DAYS
4. TROUBLE RELAXING: NOT AT ALL
GAD7 TOTAL SCORE: 6
1. FEELING NERVOUS, ANXIOUS, OR ON EDGE: MORE THAN HALF THE DAYS
3. WORRYING TOO MUCH ABOUT DIFFERENT THINGS: SEVERAL DAYS
7. FEELING AFRAID AS IF SOMETHING AWFUL MIGHT HAPPEN: SEVERAL DAYS
6. BECOMING EASILY ANNOYED OR IRRITABLE: SEVERAL DAYS
GAD7 TOTAL SCORE: 6

## 2022-02-09 ASSESSMENT — PATIENT HEALTH QUESTIONNAIRE - PHQ9
10. IF YOU CHECKED OFF ANY PROBLEMS, HOW DIFFICULT HAVE THESE PROBLEMS MADE IT FOR YOU TO DO YOUR WORK, TAKE CARE OF THINGS AT HOME, OR GET ALONG WITH OTHER PEOPLE: SOMEWHAT DIFFICULT
SUM OF ALL RESPONSES TO PHQ QUESTIONS 1-9: 7
SUM OF ALL RESPONSES TO PHQ QUESTIONS 1-9: 7

## 2022-02-10 ENCOUNTER — OFFICE VISIT (OUTPATIENT)
Dept: FAMILY MEDICINE | Facility: CLINIC | Age: 57
End: 2022-02-10
Payer: COMMERCIAL

## 2022-02-10 VITALS
HEART RATE: 62 BPM | BODY MASS INDEX: 25.04 KG/M2 | WEIGHT: 169.1 LBS | DIASTOLIC BLOOD PRESSURE: 68 MMHG | TEMPERATURE: 98.7 F | SYSTOLIC BLOOD PRESSURE: 128 MMHG | HEIGHT: 69 IN | OXYGEN SATURATION: 98 %

## 2022-02-10 DIAGNOSIS — D70.8 OTHER NEUTROPENIA (H): ICD-10-CM

## 2022-02-10 DIAGNOSIS — Z00.00 ANNUAL PHYSICAL EXAM: Primary | ICD-10-CM

## 2022-02-10 DIAGNOSIS — Z11.4 SCREENING FOR HIV (HUMAN IMMUNODEFICIENCY VIRUS): ICD-10-CM

## 2022-02-10 DIAGNOSIS — F41.1 GENERALIZED ANXIETY DISORDER: ICD-10-CM

## 2022-02-10 DIAGNOSIS — E78.00 HYPERCHOLESTEREMIA: ICD-10-CM

## 2022-02-10 DIAGNOSIS — Z11.59 NEED FOR HEPATITIS C SCREENING TEST: ICD-10-CM

## 2022-02-10 DIAGNOSIS — F32.9 MAJOR DEPRESSION, CHRONIC: ICD-10-CM

## 2022-02-10 DIAGNOSIS — Z23 NEED FOR ZOSTER VACCINATION: ICD-10-CM

## 2022-02-10 DIAGNOSIS — Z96.652 S/P TOTAL KNEE ARTHROPLASTY, LEFT: ICD-10-CM

## 2022-02-10 DIAGNOSIS — N52.1 ERECTILE DYSFUNCTION DUE TO DISEASES CLASSIFIED ELSEWHERE: ICD-10-CM

## 2022-02-10 DIAGNOSIS — Z23 NEED FOR TDAP VACCINATION: ICD-10-CM

## 2022-02-10 DIAGNOSIS — Z12.5 SCREENING FOR PROSTATE CANCER: ICD-10-CM

## 2022-02-10 PROBLEM — M17.12 OSTEOARTHRITIS OF LEFT KNEE, UNSPECIFIED OSTEOARTHRITIS TYPE: Status: RESOLVED | Noted: 2021-01-28 | Resolved: 2022-02-10

## 2022-02-10 LAB
ALBUMIN SERPL-MCNC: 4 G/DL (ref 3.4–5)
ALP SERPL-CCNC: 74 U/L (ref 40–150)
ALT SERPL W P-5'-P-CCNC: 56 U/L (ref 0–70)
ANION GAP SERPL CALCULATED.3IONS-SCNC: 4 MMOL/L (ref 3–14)
AST SERPL W P-5'-P-CCNC: 12 U/L (ref 0–45)
BILIRUB SERPL-MCNC: 0.4 MG/DL (ref 0.2–1.3)
BUN SERPL-MCNC: 21 MG/DL (ref 7–30)
CALCIUM SERPL-MCNC: 8.9 MG/DL (ref 8.5–10.1)
CHLORIDE BLD-SCNC: 105 MMOL/L (ref 94–109)
CHOLEST SERPL-MCNC: 199 MG/DL
CO2 SERPL-SCNC: 31 MMOL/L (ref 20–32)
CREAT SERPL-MCNC: 1.06 MG/DL (ref 0.66–1.25)
ERYTHROCYTE [DISTWIDTH] IN BLOOD BY AUTOMATED COUNT: 12.8 % (ref 10–15)
FASTING STATUS PATIENT QL REPORTED: NO
GFR SERPL CREATININE-BSD FRML MDRD: 82 ML/MIN/1.73M2
GLUCOSE BLD-MCNC: 95 MG/DL (ref 70–99)
HCT VFR BLD AUTO: 43.1 % (ref 40–53)
HDLC SERPL-MCNC: 62 MG/DL
HGB BLD-MCNC: 13.9 G/DL (ref 13.3–17.7)
LDLC SERPL CALC-MCNC: 98 MG/DL
MCH RBC QN AUTO: 29.7 PG (ref 26.5–33)
MCHC RBC AUTO-ENTMCNC: 32.3 G/DL (ref 31.5–36.5)
MCV RBC AUTO: 92 FL (ref 78–100)
NONHDLC SERPL-MCNC: 137 MG/DL
PLATELET # BLD AUTO: 276 10E3/UL (ref 150–450)
POTASSIUM BLD-SCNC: 4.4 MMOL/L (ref 3.4–5.3)
PROT SERPL-MCNC: 7.2 G/DL (ref 6.8–8.8)
PSA SERPL-MCNC: 1.44 UG/L (ref 0–4)
RBC # BLD AUTO: 4.68 10E6/UL (ref 4.4–5.9)
SODIUM SERPL-SCNC: 140 MMOL/L (ref 133–144)
TRIGL SERPL-MCNC: 193 MG/DL
WBC # BLD AUTO: 5 10E3/UL (ref 4–11)

## 2022-02-10 PROCEDURE — 99396 PREV VISIT EST AGE 40-64: CPT | Mod: 25 | Performed by: INTERNAL MEDICINE

## 2022-02-10 PROCEDURE — 90715 TDAP VACCINE 7 YRS/> IM: CPT | Performed by: INTERNAL MEDICINE

## 2022-02-10 PROCEDURE — 80061 LIPID PANEL: CPT | Performed by: INTERNAL MEDICINE

## 2022-02-10 PROCEDURE — 84153 ASSAY OF PSA TOTAL: CPT | Performed by: INTERNAL MEDICINE

## 2022-02-10 PROCEDURE — 99214 OFFICE O/P EST MOD 30 MIN: CPT | Mod: 25 | Performed by: INTERNAL MEDICINE

## 2022-02-10 PROCEDURE — 36415 COLL VENOUS BLD VENIPUNCTURE: CPT | Performed by: INTERNAL MEDICINE

## 2022-02-10 PROCEDURE — 90472 IMMUNIZATION ADMIN EACH ADD: CPT | Performed by: INTERNAL MEDICINE

## 2022-02-10 PROCEDURE — 87389 HIV-1 AG W/HIV-1&-2 AB AG IA: CPT | Performed by: INTERNAL MEDICINE

## 2022-02-10 PROCEDURE — 90471 IMMUNIZATION ADMIN: CPT | Performed by: INTERNAL MEDICINE

## 2022-02-10 PROCEDURE — 80053 COMPREHEN METABOLIC PANEL: CPT | Performed by: INTERNAL MEDICINE

## 2022-02-10 PROCEDURE — 85027 COMPLETE CBC AUTOMATED: CPT | Performed by: INTERNAL MEDICINE

## 2022-02-10 PROCEDURE — 86803 HEPATITIS C AB TEST: CPT | Performed by: INTERNAL MEDICINE

## 2022-02-10 PROCEDURE — 90750 HZV VACC RECOMBINANT IM: CPT | Performed by: INTERNAL MEDICINE

## 2022-02-10 RX ORDER — SIMVASTATIN 10 MG
10 TABLET ORAL AT BEDTIME
Qty: 90 TABLET | Refills: 3 | Status: SHIPPED | OUTPATIENT
Start: 2022-02-10 | End: 2022-03-04

## 2022-02-10 RX ORDER — TADALAFIL 20 MG/1
20 TABLET ORAL DAILY PRN
Qty: 5 TABLET | Refills: 0 | Status: SHIPPED | OUTPATIENT
Start: 2022-02-10 | End: 2022-03-18

## 2022-02-10 ASSESSMENT — ENCOUNTER SYMPTOMS
FREQUENCY: 0
NERVOUS/ANXIOUS: 1
HEMATURIA: 0
WEAKNESS: 0
DYSURIA: 0
DIZZINESS: 0
CHILLS: 0
DIARRHEA: 0
MYALGIAS: 0
JOINT SWELLING: 0
COUGH: 0
CONSTIPATION: 0
EYE PAIN: 0
ARTHRALGIAS: 0
ABDOMINAL PAIN: 1
HEADACHES: 0
HEARTBURN: 0
PARESTHESIAS: 0
HEMATOCHEZIA: 0
NAUSEA: 0
PALPITATIONS: 0
SORE THROAT: 1
FEVER: 0
SHORTNESS OF BREATH: 0

## 2022-02-10 ASSESSMENT — PATIENT HEALTH QUESTIONNAIRE - PHQ9: SUM OF ALL RESPONSES TO PHQ QUESTIONS 1-9: 7

## 2022-02-10 ASSESSMENT — MIFFLIN-ST. JEOR: SCORE: 1585.82

## 2022-02-10 ASSESSMENT — PAIN SCALES - GENERAL: PAINLEVEL: NO PAIN (0)

## 2022-02-10 ASSESSMENT — ANXIETY QUESTIONNAIRES: GAD7 TOTAL SCORE: 6

## 2022-02-10 NOTE — PROGRESS NOTES
SUBJECTIVE:   CC: Lasha Sims is an 56 year old male who presents for preventative health visit.     56-year-old gentleman comes in for an annual physical examination.  He is feeling good overall.  Exercises regularly.  He has some stresses at work and home.  He has no chest pain or shortness of breath.  In more recent times he has noticed issue with maintaining erection.  He has a good libido.  He takes his medication as prescribed.    Patient has been advised of split billing requirements and indicates understanding: Yes  Healthy Habits:     Getting at least 3 servings of Calcium per day:  Yes    Bi-annual eye exam:  Yes    Dental care twice a year:  Yes    Sleep apnea or symptoms of sleep apnea:  None    Diet:  Regular (no restrictions)    Frequency of exercise:  6-7 days/week    Duration of exercise:  45-60 minutes    Taking medications regularly:  Yes    Medication side effects:  Not applicable    PHQ-2 Total Score: 2    Additional concerns today:  Yes      Today's PHQ-2 Score:   PHQ-2 ( 1999 Pf[employer]) 2/9/2022   Q1: Little interest or pleasure in doing things 1   Q2: Feeling down, depressed or hopeless 1   PHQ-2 Score 2   PHQ-2 Total Score (12-17 Years)- Positive if 3 or more points; Administer PHQ-A if positive -   Q1: Little interest or pleasure in doing things Several days   Q2: Feeling down, depressed or hopeless Several days   PHQ-2 Score 2       Abuse: Current or Past(Physical, Sexual or Emotional)- No  Do you feel safe in your environment? Yes        Social History     Tobacco Use     Smoking status: Never Smoker     Smokeless tobacco: Never Used     Tobacco comment: na   Substance Use Topics     Alcohol use: Yes     If you drink alcohol do you typically have >3 drinks per day or >7 drinks per week? No    Alcohol Use 2/9/2022   Prescreen: >3 drinks/day or >7 drinks/week? No   Prescreen: >3 drinks/day or >7 drinks/week? -       Last PSA:   PSA   Date Value Ref Range Status   10/20/2020 1.04 0 - 4 ug/L  Final     Comment:     Assay Method:  Chemiluminescence using Siemens Vista analyzer       Reviewed orders with patient. Reviewed health maintenance and updated orders accordingly - Yes  BP Readings from Last 3 Encounters:   02/10/22 128/68   08/04/21 116/74   06/09/21 113/71    Wt Readings from Last 3 Encounters:   02/10/22 76.7 kg (169 lb 1.6 oz)   08/04/21 74.8 kg (165 lb)   06/09/21 74.7 kg (164 lb 9.6 oz)                  Patient Active Problem List   Diagnosis     Major depression, chronic     Generalized anxiety disorder     Hypercholesteremia     Other neutropenia (H)     IFG (impaired fasting glucose)     Inappropriate sinus tachycardia     S/P total knee arthroplasty, left     Erectile dysfunction due to diseases classified elsewhere     Need for Tdap vaccination     Need for zoster vaccination     Past Surgical History:   Procedure Laterality Date     APPENDECTOMY OPEN N/A      ARTHROPLASTY KNEE Left 03/12/2021    Procedure: ARTHROPLASTY, KNEE, TOTAL LEFT;  Surgeon: Ton Jackson MD;  [facility]     ARTHROSCOPY KNEE Left 03/12/2021    Procedure: ARTHROSCOPY, KNEE LEFT;  Surgeon: Ton Jackosn MD;  [facility]     AS TOTAL KNEE ARTHROPLASTY Left 03/12/2021     COLONOSCOPY  2017     HERNIA REPAIR Left      KNEE SURGERY Left 2003    ACL repair and partioal lateral manisectomy     LUNG SURGERY       ORTHOPEDIC SURGERY  2003    ACL repair left     HI HAND/FINGER SURGERY UNLISTED       THORACOSCOPY Right 10/19/2010    for pneumothorax. upper lobe wedge resection       Social History     Tobacco Use     Smoking status: Never Smoker     Smokeless tobacco: Never Used     Tobacco comment: na   Substance Use Topics     Alcohol use: Yes     Family History   Problem Relation Age of Onset     Hyperlipidemia Mother      Multiple myeloma Father 60     Other Cancer Father         multiple myloma     Prostate Cancer Brother 50     Brain Cancer Paternal Grandfather 60     Depression Daughter           Current Outpatient Medications   Medication Sig Dispense Refill     FLUoxetine (PROZAC) 20 MG capsule TAKE 1 CAPSULE DAILY (LAST REFILL, NEED APPOINTMENT WITH DR STEWART) 90 capsule 3     simvastatin (ZOCOR) 10 MG tablet Take 1 tablet (10 mg) by mouth At Bedtime Will need appointment before next refill. 90 tablet 3     tadalafil (CIALIS) 20 MG tablet Take 1 tablet (20 mg) by mouth daily as needed (for elerectile dysfunction) 5 tablet 0     No Known Allergies    Reviewed and updated as needed this visit by clinical staff  Tobacco  Allergies    Med Hx  Surg Hx  Fam Hx  Soc Hx       Reviewed and updated as needed this visit by Provider               Past Medical History:   Diagnosis Date     Depressive disorder 2001     Erectile dysfunction due to diseases classified elsewhere 2/10/2022     Generalized anxiety disorder 1990     Inappropriate sinus tachycardia 3/4/2021     Major depression, chronic      Mild hyperlipidemia      Osteoarthritis of left knee      Other neutropenia (H) 2/6/2020     S/P total knee arthroplasty, left 3/12/2021     Spontaneous pneumothorax 10/2010    Right side      Past Surgical History:   Procedure Laterality Date     APPENDECTOMY OPEN N/A      ARTHROPLASTY KNEE Left 03/12/2021    Procedure: ARTHROPLASTY, KNEE, TOTAL LEFT;  Surgeon: Ton Jackson MD;  [facility]     ARTHROSCOPY KNEE Left 03/12/2021    Procedure: ARTHROSCOPY, KNEE LEFT;  Surgeon: Ton Jackson MD;  [facility]     AS TOTAL KNEE ARTHROPLASTY Left 03/12/2021     COLONOSCOPY  2017     HERNIA REPAIR Left      KNEE SURGERY Left 2003    ACL repair and partioal lateral manisectomy     LUNG SURGERY       ORTHOPEDIC SURGERY  2003    ACL repair left     AL HAND/FINGER SURGERY UNLISTED       THORACOSCOPY Right 10/19/2010    for pneumothorax. upper lobe wedge resection       Review of Systems   Constitutional: Negative for chills and fever.   HENT: Positive for congestion and sore throat. Negative for ear pain  "and hearing loss.    Eyes: Negative for pain and visual disturbance.   Respiratory: Negative for cough and shortness of breath.    Cardiovascular: Negative for chest pain, palpitations and peripheral edema.   Gastrointestinal: Positive for abdominal pain. Negative for constipation, diarrhea, heartburn, hematochezia and nausea.   Genitourinary: Positive for impotence. Negative for dysuria, frequency, genital sores, hematuria, penile discharge and urgency.   Musculoskeletal: Negative for arthralgias, joint swelling and myalgias.   Skin: Negative for rash.   Neurological: Negative for dizziness, weakness, headaches and paresthesias.   Psychiatric/Behavioral: Negative for mood changes. The patient is nervous/anxious.          OBJECTIVE:   /68   Pulse 62   Temp 98.7  F (37.1  C) (Oral)   Ht 1.75 m (5' 8.9\")   Wt 76.7 kg (169 lb 1.6 oz)   SpO2 98%   BMI 25.04 kg/m      Physical Exam  GENERAL: healthy, alert and no distress  EYES: Eyes grossly normal to inspection, PERRL and conjunctivae and sclerae normal  HENT: ear canals and TM's normal, nose and mouth without ulcers or lesions  NECK: no adenopathy, no asymmetry, masses, or scars and thyroid normal to palpation  RESP: lungs clear to auscultation - no rales, rhonchi or wheezes  CV: regular rate and rhythm, normal S1 S2, no S3 or S4, no murmur, click or rub, no peripheral edema and peripheral pulses strong  ABDOMEN: soft, nontender, no hepatosplenomegaly, no masses and bowel sounds normal   (male): normal male genitalia without lesions or urethral discharge, no hernia  RECTAL: normal sphincter tone, no rectal masses, prostate normal size, smooth, nontender without nodules or masses  MS: no gross musculoskeletal defects noted, no edema  SKIN: no suspicious lesions or rashes  NEURO: Normal strength and tone, mentation intact and speech normal  PSYCH: mentation appears normal, affect normal/bright  LYMPH: no cervical, supraclavicular, axillary, or inguinal " "adenopathy    Diagnostic Test Results:  Labs reviewed in Epic    ASSESSMENT/PLAN:     1.  Annual physical exam completed and is good  2.  Major depression chronic stable continue with fluoxetine 20 mg a day.  Refills provided.  3.  Hypercholesterolemia been treated with simvastatin 10 mg a day.  Will check nonfasting lipids today.  4.  Other neutropenia.  Longstanding history of mild leukopenia with normal differential count.  A year ago evaluated by hematologist.  Appears to be constitutional in no apparent cause.  5.  Erectile dysfunction of new onset.  Libido is good.  Issue is of maintaining erection.  Will try Cialis.  Side effects discussed and prescription provided.  6.  Status post left total knee arthroplasty last year clinically recovered very well.  7.  Generalized anxiety disorder under control.  8.  A Tdap vaccine provided.  9.  First dose of Shingrix vaccine provided.  10.  Family history of brother having prostate cancer at age 50.    We will check CBC with differential, CMP, PSA, lipids nonfasting, screening for hepatitis C and HIV.  I will get back to the results.      COUNSELING:   Reviewed preventive health counseling, as reflected in patient instructions       Regular exercise       Healthy diet/nutrition    Estimated body mass index is 25.04 kg/m  as calculated from the following:    Height as of this encounter: 1.75 m (5' 8.9\").    Weight as of this encounter: 76.7 kg (169 lb 1.6 oz).         He reports that he has never smoked. He has never used smokeless tobacco.      Counseling Resources:  ATP IV Guidelines  Pooled Cohorts Equation Calculator  FRAX Risk Assessment  ICSI Preventive Guidelines  Dietary Guidelines for Americans, 2010  myLINGO's MyPlate  ASA Prophylaxis  Lung CA Screening    Pal Wilks MD  [facility]  Answers for HPI/ROS submitted by the patient on 2/9/2022  If you checked off any problems, how difficult have these problems made it for you to do your " work, take care of things at home, or get along with other people?: Somewhat difficult  PHQ9 TOTAL SCORE: 7  JESSIKA 7 TOTAL SCORE: 6

## 2022-02-11 LAB
HCV AB SERPL QL IA: NONREACTIVE
HIV 1+2 AB+HIV1 P24 AG SERPL QL IA: NONREACTIVE

## 2022-03-04 DIAGNOSIS — M17.12 PRIMARY OSTEOARTHRITIS OF LEFT KNEE: ICD-10-CM

## 2022-03-04 DIAGNOSIS — E78.00 HYPERCHOLESTEREMIA: ICD-10-CM

## 2022-03-04 DIAGNOSIS — Z98.890 S/P ARTHROSCOPY OF LEFT KNEE: ICD-10-CM

## 2022-03-04 DIAGNOSIS — Z98.890 H/O LEFT KNEE SURGERY: Primary | ICD-10-CM

## 2022-03-04 RX ORDER — SIMVASTATIN 10 MG
TABLET ORAL
Qty: 90 TABLET | Refills: 3 | Status: SHIPPED | OUTPATIENT
Start: 2022-03-04 | End: 2023-02-27

## 2022-03-04 NOTE — PROGRESS NOTES
[facility]  Orthopaedic Surgery Consultation by Ton Jackson M.D.    Lasha Sims MRN# [id number]   Age: 55 year old YOB: 1965     Requesting physician: Myesha Carter     Background history:  DX:  1. Spontaneous pneumothorax  2. Hyperlipidemia  3. Hypercholesterolemia  4. Depressive disorder    TREATMENTS:  1. 2004, ACL reconstruction left knee, [facility].  2. 3/12/2021, left total knee replacement, , [facility]          History of Present Illness:     55-year-old male status post ACL reconstruction and varus alignment with pain of left knee most likely due to medial compartmental osteoarthritic changes who underwent left total knee arthroplasty on 3/12/2021.     Patient is now 1 year after the above-mentioned procedure states that he is doing well.  He is very happy with the results.  The knee feels strong and he trust it completely.  He is competing at the [employer] games and is doing well.  Not using any pain medication.  He is not experiencing any significant limitations.      Social:   Occupation: Desk job  Living situation: With spouse  Hobbies / Sports: CrossFit, walking dog  Smoking: No  Alcohol: No  Illicit drug use: No         Physical Exam:     EXAMINATION pertinent findings:   PSYCH: Pleasant, healthy-appearing, alert, oriented x3, cooperative. Normal mood and affect.  VITAL SIGNS: There were no vitals taken for this visit.  Reviewed nursing intake notes.   There is no height or weight on file to calculate BMI.  RESP: non labored breathing   ABD: benign, soft, non-tender, no acute peritoneal findings  SKIN: grossly normal   LYMPHATIC: grossly normal, no adenopathy, no extremity edema  NEURO: grossly normal , no motor deficits  VASCULAR: satisfactory perfusion of all extremities   MUSCULOSKELETAL:   Alignment: Neutral    L knee: Incision is clean, dry and intact.  No signs of infection.  -0-0  . Straight leg raise  +. No redness, warmth or skin changes present.  No effusion present. Ligamentously stable in both ML and AP direction.     Left LE:   Thigh and leg compartments soft and compressible   +Quad/TA/GSC/FHL/EHL   SILT DP/SP/Dionne/Saph/Tib nerve distributions   Palpable dorsalis pedis pulse            Data:   All laboratory data reviewed  All imaging studies reviewed by me personally.    XR knee left 3/9/2022:  My interpretation: Status post left total knee arthroplasty.  Adequate positioning and sizing of components.  Adequate fixation.  No signs of fractures or immediate postoperative complications.         Assessment and Plan:   Assessment:  55-year-old male status post ACL reconstruction and varus alignment with pain of left knee most likely due to medial compartmental osteoarthritic changes who underwent left total knee arthroplasty on 3/12/2021.  Patient has recovered well.      Plan:  I extensively discussed my findings with the patient.  We discussed that he is doing very well and I am happy that he has back to his normal activities.  We discussed that high impact loading, young age and increased activity predispose him for a revision in the future.  Patient understands and agrees to treatment plan as set forth.  We will follow-up with him at the 3year postoperative visit with x-rays or sooner if there are any additional questions or concerns.    Ton Jackson MD, PhD     Adult Reconstruction  [facility] Department of Orthopaedic Surgery  Pager [phone]    Total combined visit time and work time before and after clinic visit = 20 min

## 2022-03-06 ASSESSMENT — KOOS JR: KOOS JR SCORING: 100

## 2022-03-09 ENCOUNTER — OFFICE VISIT (OUTPATIENT)
Dept: ORTHOPEDICS | Facility: CLINIC | Age: 57
End: 2022-03-09
Payer: COMMERCIAL

## 2022-03-09 ENCOUNTER — ANCILLARY PROCEDURE (OUTPATIENT)
Dept: GENERAL RADIOLOGY | Facility: CLINIC | Age: 57
End: 2022-03-09
Attending: STUDENT IN AN ORGANIZED HEALTH CARE EDUCATION/TRAINING PROGRAM
Payer: COMMERCIAL

## 2022-03-09 DIAGNOSIS — Z96.652 STATUS POST LEFT KNEE REPLACEMENT: ICD-10-CM

## 2022-03-09 DIAGNOSIS — Z98.890 S/P ARTHROSCOPY OF LEFT KNEE: ICD-10-CM

## 2022-03-09 DIAGNOSIS — Z98.890 H/O LEFT KNEE SURGERY: ICD-10-CM

## 2022-03-09 DIAGNOSIS — M17.12 PRIMARY OSTEOARTHRITIS OF LEFT KNEE: Primary | ICD-10-CM

## 2022-03-09 DIAGNOSIS — M17.12 PRIMARY OSTEOARTHRITIS OF LEFT KNEE: ICD-10-CM

## 2022-03-09 PROCEDURE — 99213 OFFICE O/P EST LOW 20 MIN: CPT | Performed by: STUDENT IN AN ORGANIZED HEALTH CARE EDUCATION/TRAINING PROGRAM

## 2022-03-09 PROCEDURE — 73562 X-RAY EXAM OF KNEE 3: CPT | Mod: LT | Performed by: RADIOLOGY

## 2022-03-09 NOTE — LETTER
3/9/2022         RE: Lasha Sims  [id number] [address] [address]        Dear Colleague,    Thank you for referring your patient, Lasha Sims, to the [facility] [place]. Please see a copy of my visit note below.        [facility]  Orthopaedic Surgery Consultation by Ton Jackson M.D.    Lasha Sims MRN# [id number]   Age: 55 year old YOB: 1965     Requesting physician: Myesha Carter     Background history:  DX:  1. Spontaneous pneumothorax  2. Hyperlipidemia  3. Hypercholesterolemia  4. Depressive disorder    TREATMENTS:  1. 2004, ACL reconstruction left knee, [facility].  2. 3/12/2021, left total knee replacement, , [facility]          History of Present Illness:     55-year-old male status post ACL reconstruction and varus alignment with pain of left knee most likely due to medial compartmental osteoarthritic changes who underwent left total knee arthroplasty on 3/12/2021.     Patient is now 1 year after the above-mentioned procedure states that he is doing well.  He is very happy with the results.  The knee feels strong and he trust it completely.  He is competing at the [employer] games and is doing well.  Not using any pain medication.  He is not experiencing any significant limitations.      Social:   Occupation: Desk job  Living situation: With spouse  Hobbies / Sports: CrossFit, walking dog  Smoking: No  Alcohol: No  Illicit drug use: No         Physical Exam:     EXAMINATION pertinent findings:   PSYCH: Pleasant, healthy-appearing, alert, oriented x3, cooperative. Normal mood and affect.  VITAL SIGNS: There were no vitals taken for this visit.  Reviewed nursing intake notes.   There is no height or weight on file to calculate BMI.  RESP: non labored breathing   ABD: benign, soft, non-tender, no acute peritoneal findings  SKIN: grossly normal   LYMPHATIC: grossly normal, no adenopathy, no  extremity edema  NEURO: grossly normal , no motor deficits  VASCULAR: satisfactory perfusion of all extremities   MUSCULOSKELETAL:   Alignment: Neutral    L knee: Incision is clean, dry and intact.  No signs of infection.  -0-0  . Straight leg raise +. No redness, warmth or skin changes present.  No effusion present. Ligamentously stable in both ML and AP direction.     Left LE:   Thigh and leg compartments soft and compressible   +Quad/TA/GSC/FHL/EHL   SILT DP/SP/Dionne/Saph/Tib nerve distributions   Palpable dorsalis pedis pulse            Data:   All laboratory data reviewed  All imaging studies reviewed by me personally.    XR knee left 3/9/2022:  My interpretation: Status post left total knee arthroplasty.  Adequate positioning and sizing of components.  Adequate fixation.  No signs of fractures or immediate postoperative complications.         Assessment and Plan:   Assessment:  55-year-old male status post ACL reconstruction and varus alignment with pain of left knee most likely due to medial compartmental osteoarthritic changes who underwent left total knee arthroplasty on 3/12/2021.  Patient has recovered well.      Plan:  I extensively discussed my findings with the patient.  We discussed that he is doing very well and I am happy that he has back to his normal activities.  We discussed that high impact loading, young age and increased activity predispose him for a revision in the future.  Patient understands and agrees to treatment plan as set forth.  We will follow-up with him at the 3year postoperative visit with x-rays or sooner if there are any additional questions or concerns.    Ton Jackson MD, PhD     Adult Reconstruction  [facility] Department of Orthopaedic Surgery  Pager [phone]    Total combined visit time and work time before and after clinic visit = 20 min

## 2022-03-18 ENCOUNTER — VIRTUAL VISIT (OUTPATIENT)
Dept: FAMILY MEDICINE | Facility: CLINIC | Age: 57
End: 2022-03-18
Payer: COMMERCIAL

## 2022-03-18 DIAGNOSIS — F41.1 GENERALIZED ANXIETY DISORDER: Primary | ICD-10-CM

## 2022-03-18 DIAGNOSIS — N52.1 ERECTILE DYSFUNCTION DUE TO DISEASES CLASSIFIED ELSEWHERE: ICD-10-CM

## 2022-03-18 DIAGNOSIS — F32.9 MAJOR DEPRESSION, CHRONIC: ICD-10-CM

## 2022-03-18 PROCEDURE — 99213 OFFICE O/P EST LOW 20 MIN: CPT | Mod: 95 | Performed by: INTERNAL MEDICINE

## 2022-03-18 RX ORDER — TADALAFIL 10 MG/1
10 TABLET ORAL DAILY PRN
Qty: 30 TABLET | Refills: 3 | Status: SHIPPED | OUTPATIENT
Start: 2022-03-18 | End: 2024-07-16

## 2022-03-18 RX ORDER — BUSPIRONE HYDROCHLORIDE 10 MG/1
10 TABLET ORAL 3 TIMES DAILY
Qty: 90 TABLET | Refills: 3 | Status: SHIPPED | OUTPATIENT
Start: 2022-03-18 | End: 2023-03-16

## 2022-03-18 ASSESSMENT — PATIENT HEALTH QUESTIONNAIRE - PHQ9
10. IF YOU CHECKED OFF ANY PROBLEMS, HOW DIFFICULT HAVE THESE PROBLEMS MADE IT FOR YOU TO DO YOUR WORK, TAKE CARE OF THINGS AT HOME, OR GET ALONG WITH OTHER PEOPLE: VERY DIFFICULT
SUM OF ALL RESPONSES TO PHQ QUESTIONS 1-9: 13
SUM OF ALL RESPONSES TO PHQ QUESTIONS 1-9: 13

## 2022-03-18 NOTE — PROGRESS NOTES
Al is a 56 year old who is being evaluated via a billable video visit.      How would you like to obtain your AVS? MyChart  If the video visit is dropped, the invitation should be resent by: Send to e-mail at: [email or web address]  Will anyone else be joining your video visit? No    Video Start Time: 12:56 PM    Assessment & Plan     1.  Generalized anxiety disorder with increased symptoms due to family stress.  Patient currently on fluoxetine 20 mg a day.  Decided to add buspirone 10 mg 3 times daily and see if that helps.  Down the road that could be weaned off.  2.  Major depression chronic stable his depression he feels is stable with present dose of fluoxetine.  3.  Erectile dysfunction.  Cialis I prescribed seems to work well for him.  He requested a low-dose of 10 mg tablets.  Prescription will be sent.  No untoward side effects noted.    Pal Wilks MD  [facility]   Al is a 56 year old who presents for the following health issues     History of Present Illness       Mental Health Follow-up:                    Today's PHQ-9         PHQ-9 Total Score: 13  PHQ-9 Q9 Thoughts of better off dead/self-harm past 2 weeks :   (P) Not at all    How difficult have these problems made it for you to do your work, take care of things at home, or get along with other people: Very difficult        Reason for visit:  Medication check    He eats 4 or more servings of fruits and vegetables daily.He consumes 0 sweetened beverage(s) daily.He exercises with enough effort to increase his heart rate 30 to 60 minutes per day.  He exercises with enough effort to increase his heart rate 6 days per week. He is missing 1 dose(s) of medications per week.  He is not taking prescribed medications regularly due to other.       Medication check  Please look at PHQ-9 results     The patient is having a stressful family situation with couple of his children and has felt increased anxiety.  He has  chronic depression but he does not feel his depressive symptoms are worse.  He is on fluoxetine 20 mg a day.  But his anxiety is definitely worse.  Is wondering if there is additional medication or adjustment in dose of fluoxetine he could consider.  Also Cialis that I prescribed for erectile dysfunction is working well for him and he would like a refill.  He is requesting a lower dose of 10 mg tablet.    Review of Systems   Constitutional, HEENT, cardiovascular, pulmonary, gi and gu systems are negative, except as otherwise noted.      Objective           Vitals:  No vitals were obtained today due to virtual visit.    Physical Exam   GENERAL: Healthy, alert and no distress  EYES: Eyes grossly normal to inspection.  No discharge or erythema, or obvious scleral/conjunctival abnormalities.  RESP: No audible wheeze, cough, or visible cyanosis.  No visible retractions or increased work of breathing.    SKIN: Visible skin clear. No significant rash, abnormal pigmentation or lesions.  NEURO: Cranial nerves grossly intact.  Mentation and speech appropriate for age.  PSYCH: Mentation appears normal, affect normal/bright, judgement and insight intact, normal speech and appearance well-groomed.                Video-Visit Details    Type of service:  Video Visit    Video End Time:1:04 PM    Originating Location (pt. Location): Home    Distant Location (provider location):  [facility]     Platform used for Video Visit: [employer]

## 2022-03-19 ASSESSMENT — PATIENT HEALTH QUESTIONNAIRE - PHQ9: SUM OF ALL RESPONSES TO PHQ QUESTIONS 1-9: 13

## 2022-04-26 ENCOUNTER — MYC MEDICAL ADVICE (OUTPATIENT)
Dept: FAMILY MEDICINE | Facility: CLINIC | Age: 57
End: 2022-04-26
Payer: COMMERCIAL

## 2022-04-26 NOTE — TELEPHONE ENCOUNTER
To Dr. Wilks to review/advise - would you like Pt to set up another virtual visit to discuss?   Pt recently had visit on 3/18/22 and was started on buspirone, He does not believe it has helped him and would like advisement on alternatives.     Marina Madrid RN, BSN  [facility]

## 2022-04-26 NOTE — TELEPHONE ENCOUNTER
Have patient increase dose of Fluoxetine to 40 mg a day for now and see it helps. Ask if he will be interested in talking to behavior health therapist regarding the stress and anxiety. I can make the referral

## 2022-04-27 ENCOUNTER — TELEPHONE (OUTPATIENT)
Dept: FAMILY MEDICINE | Facility: CLINIC | Age: 57
End: 2022-04-27
Payer: COMMERCIAL

## 2022-04-27 NOTE — TELEPHONE ENCOUNTER
Left message for patient to return call to reschedule appointment, Dr. Wilks is out of the office on Friday

## 2022-04-27 NOTE — TELEPHONE ENCOUNTER
Pt requested to set up virtual visit to discuss with provider. Set up virtual appointment in open slot this Friday 4/29/22 at 2:00pm.    Marina Madrid RN, BSN  [facility]

## 2022-04-28 NOTE — TELEPHONE ENCOUNTER
Please inform patient I am ill and will not be back to work on 29th.  Reschedule virtual visit.

## 2022-04-29 ENCOUNTER — TELEPHONE (OUTPATIENT)
Dept: FAMILY MEDICINE | Facility: CLINIC | Age: 57
End: 2022-04-29
Payer: COMMERCIAL

## 2022-05-04 NOTE — TELEPHONE ENCOUNTER
Please change patient's appointment to 10:00 am tomorrow in person clinic visit with me. Thank you

## 2022-05-09 ASSESSMENT — ANXIETY QUESTIONNAIRES
GAD7 TOTAL SCORE: 8
2. NOT BEING ABLE TO STOP OR CONTROL WORRYING: MORE THAN HALF THE DAYS
6. BECOMING EASILY ANNOYED OR IRRITABLE: SEVERAL DAYS
4. TROUBLE RELAXING: SEVERAL DAYS
8. IF YOU CHECKED OFF ANY PROBLEMS, HOW DIFFICULT HAVE THESE MADE IT FOR YOU TO DO YOUR WORK, TAKE CARE OF THINGS AT HOME, OR GET ALONG WITH OTHER PEOPLE?: VERY DIFFICULT
GAD7 TOTAL SCORE: 8
5. BEING SO RESTLESS THAT IT IS HARD TO SIT STILL: SEVERAL DAYS
3. WORRYING TOO MUCH ABOUT DIFFERENT THINGS: MORE THAN HALF THE DAYS
7. FEELING AFRAID AS IF SOMETHING AWFUL MIGHT HAPPEN: NOT AT ALL
1. FEELING NERVOUS, ANXIOUS, OR ON EDGE: SEVERAL DAYS
7. FEELING AFRAID AS IF SOMETHING AWFUL MIGHT HAPPEN: NOT AT ALL
GAD7 TOTAL SCORE: 8

## 2022-05-09 ASSESSMENT — PATIENT HEALTH QUESTIONNAIRE - PHQ9
SUM OF ALL RESPONSES TO PHQ QUESTIONS 1-9: 7
SUM OF ALL RESPONSES TO PHQ QUESTIONS 1-9: 7
10. IF YOU CHECKED OFF ANY PROBLEMS, HOW DIFFICULT HAVE THESE PROBLEMS MADE IT FOR YOU TO DO YOUR WORK, TAKE CARE OF THINGS AT HOME, OR GET ALONG WITH OTHER PEOPLE: VERY DIFFICULT

## 2022-05-10 ENCOUNTER — VIRTUAL VISIT (OUTPATIENT)
Dept: FAMILY MEDICINE | Facility: CLINIC | Age: 57
End: 2022-05-10
Payer: COMMERCIAL

## 2022-05-10 ENCOUNTER — TELEPHONE (OUTPATIENT)
Dept: FAMILY MEDICINE | Facility: CLINIC | Age: 57
End: 2022-05-10

## 2022-05-10 DIAGNOSIS — F32.9 MAJOR DEPRESSION, CHRONIC: Primary | ICD-10-CM

## 2022-05-10 DIAGNOSIS — F41.1 GENERALIZED ANXIETY DISORDER: ICD-10-CM

## 2022-05-10 PROCEDURE — 99214 OFFICE O/P EST MOD 30 MIN: CPT | Mod: 95 | Performed by: INTERNAL MEDICINE

## 2022-05-10 ASSESSMENT — ANXIETY QUESTIONNAIRES: GAD7 TOTAL SCORE: 8

## 2022-05-10 ASSESSMENT — PATIENT HEALTH QUESTIONNAIRE - PHQ9
10. IF YOU CHECKED OFF ANY PROBLEMS, HOW DIFFICULT HAVE THESE PROBLEMS MADE IT FOR YOU TO DO YOUR WORK, TAKE CARE OF THINGS AT HOME, OR GET ALONG WITH OTHER PEOPLE: VERY DIFFICULT
SUM OF ALL RESPONSES TO PHQ QUESTIONS 1-9: 7

## 2022-05-10 NOTE — PROGRESS NOTES
Al is a 56 year old who is being evaluated via a billable video visit.        How would you like to obtain your AVS? MyChart  If the video visit is dropped, the invitation should be resent by: Send to e-mail at: [email or web address]  Will anyone else be joining your video visit? No    Video Start Time: 9:53 AM    Assessment & Plan     1.  Major depression chronic and now seems to have exacerbation.  2.  Generalized anxiety disorder.    Patient is struggling quite a bit and fluoxetine 20 mg a day as well as buspirone 10 mg 3 times daily is not adequately controlling his symptoms.  He has been struggling at work and was asked to take some time off.  He has applied for short-term disability.  At this point I feel we need psychiatric help.  Referral to Jorge and  is been recommended and made.  I will also ask Marilee Talley our behavioral health therapist to talk to the patient and see how we can best support him.  At this point is mentally not ready to return to work.      Pal Wilks MD  [facility]   Al is a 56 year old who presents for the following health issues     History of Present Illness       Mental Health Follow-up:  Patient presents to follow-up on Depression & Anxiety.Patient's depression since last visit has been:  Medium  The patient is not having other symptoms associated with depression.  Patient's anxiety since last visit has been:  Bad  The patient is not having other symptoms associated with anxiety.  Any significant life events: relationship concerns and job concerns  Patient is feeling anxious or having panic attacks.  Patient has no concerns about alcohol or drug use.       Today's PHQ-9         PHQ-9 Total Score: 7  PHQ-9 Q9 Thoughts of better off dead/self-harm past 2 weeks :   (P) Not at all    How difficult have these problems made it for you to do your work, take care of things at home, or get along with other people: Very  difficult    Today's JESSIKA-7 Score: 8    He eats 2-3 servings of fruits and vegetables daily.He consumes 0 sweetened beverage(s) daily.He exercises with enough effort to increase his heart rate 30 to 60 minutes per day.  He exercises with enough effort to increase his heart rate 5 days per week.   He is taking medications regularly.       Medication Followup of fluoriteshtine, Buspar     Taking Medication as prescribed: yes    Side Effects:  None    Medication Helping Symptoms:  Periodically, buspar need advice. Neither have been helping get things done at work      Concern - Short term disability questions       Is a 56-year-old gentleman with known history of chronic depression and anxiety and on long-term treatment with fluoxetine 20 mg a day has had exacerbation of his symptoms which he attributes to several issues going on in his life.  In January his wife filed for divorce and that preceding his going on.  That is been quite distressful for him.  Last month his daughter tried to commit suicide which has compounded his issues.  At work he has been struggling and unable to perform his task partly because inability to concentrate.  He is an .  His supervisor has communicated with him regarding his performance and talked about getting some short-term disability.  Patient has applied now for that.  Currently not driving an income is also putting stress.    Some 20 years ago when his father passed away he went through a similar situation and eventually did okay and remained on fluoxetine.    Review of Systems   Constitutional, HEENT, cardiovascular, pulmonary, GI, , musculoskeletal, neuro, skin, endocrine and psych systems are negative, except as otherwise noted.      Objective           Vitals:  No vitals were obtained today due to virtual visit.    Physical Exam   GENERAL: Healthy, alert and no distress  EYES: Eyes grossly normal to inspection.  No discharge or erythema, or obvious  scleral/conjunctival abnormalities.  RESP: No audible wheeze, cough, or visible cyanosis.  No visible retractions or increased work of breathing.    SKIN: Visible skin clear. No significant rash, abnormal pigmentation or lesions.  NEURO: Cranial nerves grossly intact.  Mentation and speech appropriate for age.  PSYCH: Mentation appears normal, affect normal/bright, judgement and insight intact, normal speech and appearance well-groomed.                Video-Visit Details    Type of service:  Video Visit    Video End Time:10:21 AM    Originating Location (pt. Location): Home    Distant Location (provider location):  [facility]     Platform used for Video Visit: [employer]

## 2022-05-11 ENCOUNTER — TELEPHONE (OUTPATIENT)
Dept: BEHAVIORAL HEALTH | Facility: CLINIC | Age: 57
End: 2022-05-11
Payer: COMMERCIAL

## 2022-05-11 NOTE — TELEPHONE ENCOUNTER
Reached out to pt to offer [facility] appt per the request of Efe Aguillon MD. Scheduled with pt for 5/13/22.

## 2022-05-13 ENCOUNTER — VIRTUAL VISIT (OUTPATIENT)
Dept: BEHAVIORAL HEALTH | Facility: CLINIC | Age: 57
End: 2022-05-13
Payer: COMMERCIAL

## 2022-05-13 DIAGNOSIS — F33.0 MILD RECURRENT MAJOR DEPRESSION (H): ICD-10-CM

## 2022-05-13 DIAGNOSIS — F41.1 GENERALIZED ANXIETY DISORDER: Primary | ICD-10-CM

## 2022-05-13 PROCEDURE — 90834 PSYTX W PT 45 MINUTES: CPT | Mod: 95 | Performed by: SOCIAL WORKER

## 2022-05-13 ASSESSMENT — PATIENT HEALTH QUESTIONNAIRE - PHQ9
10. IF YOU CHECKED OFF ANY PROBLEMS, HOW DIFFICULT HAVE THESE PROBLEMS MADE IT FOR YOU TO DO YOUR WORK, TAKE CARE OF THINGS AT HOME, OR GET ALONG WITH OTHER PEOPLE: VERY DIFFICULT
SUM OF ALL RESPONSES TO PHQ QUESTIONS 1-9: 9
SUM OF ALL RESPONSES TO PHQ QUESTIONS 1-9: 9

## 2022-05-13 ASSESSMENT — COLUMBIA-SUICIDE SEVERITY RATING SCALE - C-SSRS
2. HAVE YOU ACTUALLY HAD ANY THOUGHTS OF KILLING YOURSELF?: NO
ATTEMPT LIFETIME: NO
1. HAVE YOU WISHED YOU WERE DEAD OR WISHED YOU COULD GO TO SLEEP AND NOT WAKE UP?: NO
TOTAL  NUMBER OF ABORTED OR SELF INTERRUPTED ATTEMPTS LIFETIME: NO
TOTAL  NUMBER OF INTERRUPTED ATTEMPTS LIFETIME: NO
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
## 2022-05-14 ASSESSMENT — PATIENT HEALTH QUESTIONNAIRE - PHQ9: SUM OF ALL RESPONSES TO PHQ QUESTIONS 1-9: 9

## 2022-05-17 ENCOUNTER — TRANSFERRED RECORDS (OUTPATIENT)
Dept: HEALTH INFORMATION MANAGEMENT | Facility: CLINIC | Age: 57
End: 2022-05-17
Payer: COMMERCIAL

## 2022-05-20 NOTE — LETTER
5/6/2019       RE: Lasha Sims  [address]  [place] [address]     Dear Colleague,    Thank you for referring your patient, Lasha Sims, to the [facility]. Please see a copy of my visit note below.    Large Joint Injection/Arthocentesis: L knee joint  Date/Time: 5/6/2019 7:42 AM  Performed by: Bobby Underwood MD  Authorized by: Bobby Underwood MD     Needle Size:  22 G  Guidance: landmark guided    Approach:  Anterolateral  Location:  Knee      Medications:  8 mL lidocaine (PF) 0.5 %; 40 mg triamcinolone 40 MG/ML  Outcome:  Tolerated well, no immediate complications  Procedure discussed: discussed risks, benefits, and alternatives    Consent Given by:  Patient  Timeout: timeout called immediately prior to procedure    Prep: patient was prepped and draped in usual sterile fashion     Scribed by Jessica Watt ATC for Dr. Underwood on 5/6/19 at 7:45AM, based on the provider's statements to me.            53-year-old male returns to my clinic today for interval follow-up I performed a corticosteroid injection on him in December 2018.  He states that the injection was 'magical' or at least it made his knee feel this way.  Overall he is very happy with it he like a repeat injection today.    Examination shows a pleasant male no acute distress he is articulate and interactive.  He has a trace effusion.  He has reasonable motion.  His ligaments are stable.  He is neurovascularly intact.    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  In light of this I offered him a repeat injection.  He understands that he can have 3 to 4 injections/year.  No lifetime maximum.  He would like to proceed with this.    After written informed consent obtained and signed, after sufficient prepping and  Daily Note     Today's date: 2022  Patient name: Shaji Marrero  : 1953  MRN: [id number]  Referring provider: Mely Gloria DO  Dx:   Encounter Diagnosis     ICD-10-CM    1  Posterior tibial tendinitis of right lower extremity  M76 [id number]                   Subjective: Pt reports that she is feeling good today with no complaints  Objective: See treatment diary below      Assessment: Tolerated treatment well  Continues to demonstrate a good carry over with current exercises  Mostly challenged with SL heel raises but able to complete low reps  Patient would benefit from continued PT  Plan: Continue per plan of care        Precautions: standard, L TKR(limited motion<90)      Manuals 5/12 5/18 5/20   4/26 4/29 5/3 5/6 5/10   PROM/stretch  10' 10' 10'   10' 10' 10 10' 10   IASTM laser plantar protocol 6' 6' 6'   6' 6' 6 6' 6'                TherEx             Seated heel raise 30x 30x 30x   30x 30x  30x 30x 30x   doming 30x 30x 30x   30x 30x 30x 30x 30x   Post tib ball squeeze heel lift 30x  30x 30x   30x 30x  30x 30x 30x   prostretch seated 10''x10 10"x10 10"x 10   10x 10''x10 10x10" 10x10" 10x   Tband arch lift 30x RED 30x RED 30x Red   30x 30x YTB 30x 30 YTB 30x YTB   Tband B EV 30x RED 30x Red 30x Red   30x 30x 30x 30x 30x   Tband spider walk 15x RED 15x RED 15x Red   15x 15x 15x 15x 15x   Heel raise B on step 10x 10x 10x   10x 10x 10x 10x 10x   SL heelraise R 5x 5x 5x   5x 5x  5x 5x 5x                                                                                                                        Ther Activity                                       Gait Training                                       Modalities sterile technique, 40 mg of kenalog and , 8 cc of 1% lidocaine were injected without complication into the left knee. The patient tolerated the injection well and a sterile dressing was applied.         Again, thank you for allowing me to participate in the care of your patient.      Sincerely,    Bobby Underwood MD

## 2022-05-24 ENCOUNTER — VIRTUAL VISIT (OUTPATIENT)
Dept: BEHAVIORAL HEALTH | Facility: CLINIC | Age: 57
End: 2022-05-24
Payer: COMMERCIAL

## 2022-05-24 DIAGNOSIS — Z53.9 NO SHOW: Primary | ICD-10-CM

## 2022-05-24 PROCEDURE — 99207 PR NO SHOW FOR SCHEDULED APPT: CPT | Performed by: SOCIAL WORKER

## 2022-07-06 ENCOUNTER — TRANSFERRED RECORDS (OUTPATIENT)
Dept: HEALTH INFORMATION MANAGEMENT | Facility: CLINIC | Age: 57
End: 2022-07-06

## 2022-07-18 ASSESSMENT — PATIENT HEALTH QUESTIONNAIRE - PHQ9
10. IF YOU CHECKED OFF ANY PROBLEMS, HOW DIFFICULT HAVE THESE PROBLEMS MADE IT FOR YOU TO DO YOUR WORK, TAKE CARE OF THINGS AT HOME, OR GET ALONG WITH OTHER PEOPLE: NOT DIFFICULT AT ALL
SUM OF ALL RESPONSES TO PHQ QUESTIONS 1-9: 2
SUM OF ALL RESPONSES TO PHQ QUESTIONS 1-9: 2

## 2022-07-19 ENCOUNTER — LAB (OUTPATIENT)
Dept: LAB | Facility: CLINIC | Age: 57
End: 2022-07-19

## 2022-07-19 ENCOUNTER — VIRTUAL VISIT (OUTPATIENT)
Dept: FAMILY MEDICINE | Facility: CLINIC | Age: 57
End: 2022-07-19
Payer: COMMERCIAL

## 2022-07-19 DIAGNOSIS — F41.1 GENERALIZED ANXIETY DISORDER: Primary | ICD-10-CM

## 2022-07-19 DIAGNOSIS — Z11.3 SCREEN FOR STD (SEXUALLY TRANSMITTED DISEASE): ICD-10-CM

## 2022-07-19 DIAGNOSIS — F32.9 MAJOR DEPRESSION, CHRONIC: ICD-10-CM

## 2022-07-19 DIAGNOSIS — R41.840 LACK OF CONCENTRATION: ICD-10-CM

## 2022-07-19 PROCEDURE — 86696 HERPES SIMPLEX TYPE 2 TEST: CPT

## 2022-07-19 PROCEDURE — 99214 OFFICE O/P EST MOD 30 MIN: CPT | Mod: 95 | Performed by: INTERNAL MEDICINE

## 2022-07-19 PROCEDURE — 86695 HERPES SIMPLEX TYPE 1 TEST: CPT

## 2022-07-19 PROCEDURE — 36415 COLL VENOUS BLD VENIPUNCTURE: CPT

## 2022-07-19 ASSESSMENT — PATIENT HEALTH QUESTIONNAIRE - PHQ9
SUM OF ALL RESPONSES TO PHQ QUESTIONS 1-9: 2
10. IF YOU CHECKED OFF ANY PROBLEMS, HOW DIFFICULT HAVE THESE PROBLEMS MADE IT FOR YOU TO DO YOUR WORK, TAKE CARE OF THINGS AT HOME, OR GET ALONG WITH OTHER PEOPLE: NOT DIFFICULT AT ALL

## 2022-07-19 NOTE — PROGRESS NOTES
Al is a 57 year old who is being evaluated via a billable video visit.      How would you like to obtain your AVS? MyChart  If the video visit is dropped, the invitation should be resent by: Text to cell phone: on file  Will anyone else be joining your video visit? No        Assessment & Plan     1.  Generalized anxiety disorder.  With combination of BuSpar and fluoxetine seems little better than it was a month or so ago.  He has been off work since last week of April for a mental health crisis and will be returning again in August..  He has a upcoming appointment with psychiatrist in September.  2.  Major depression chronic.  Since last clinic visit seems to have also improved with fluoxetine.  Psychiatric appointment pending.   3.  Lack of concentration and focus.  Consider ADHD.  Will refer to psychologist for ADHD screening.  4.  Screening for STD.  Patient's new girlfriend indicated genital herpes of new onset.  He has never had any lesions.  He has had cold sores in the past.  Would like to be screened.  We will proceed with hepatitis simplex type I and II antibody screening.  We will contact her with results.          Return in about 2 months (around 9/19/2022) for prn.    Pal Wilks MD  [facility]   Al is a 57 year old, presenting for the following health issues:  No chief complaint on file.      History of Present Illness       Reason for visit:  STD screening  Symptom onset:  3-4 weeks ago  Symptoms include:  No symptoms  Symptom intensity:  Mild  Symptom progression:  Staying the same  Had these symptoms before:  No  What makes it worse:  No  What makes it better:  No    He eats 4 or more servings of fruits and vegetables daily.He consumes 0 sweetened beverage(s) daily.   He is taking medications regularly.    Today's PHQ-9         PHQ-9 Total Score: 2    PHQ-9 Q9 Thoughts of better off dead/self-harm past 2 weeks :   Not at all    How difficult have these  problems made it for you to do your work, take care of things at home, or get along with other people: Not difficult at all     Is 57 gentleman pending video visit today for couple of reasons.  He has been off work since last week of April.  He had short-term disability was denied.  Family and personal leave was declined.  He stopped work because of the mental health crisis.  His scheduled to return to work first week of August.  He went through divorce and that has not been finalized.  His depression and anxiety both are better than they were even a month ago.  He has an appointment with a psychiatrist in early September.  In the meantime he is continuing with BuSpar and fluoxetine.  He still has issues with concentration and focus.  He is concerned that will affect his work when he returns.  He is concerned he may have ADHD.  Recently he started bleeding again.  The patient is stating informed him that she broke out into a genital herpes and that she never had that before.  He has never had any genital herpes symptoms himself.  He occasionally will get a cold sore.  Would like to be screened.        Review of Systems   Constitutional, HEENT, cardiovascular, pulmonary, GI, , musculoskeletal, neuro, skin, endocrine and psych systems are negative, except as otherwise noted.      Objective           Vitals:  No vitals were obtained today due to virtual visit.    Physical Exam   GENERAL: Healthy, alert and no distress  EYES: Eyes grossly normal to inspection.  No discharge or erythema, or obvious scleral/conjunctival abnormalities.  RESP: No audible wheeze, cough, or visible cyanosis.  No visible retractions or increased work of breathing.    SKIN: Visible skin clear. No significant rash, abnormal pigmentation or lesions.  NEURO: Cranial nerves grossly intact.  Mentation and speech appropriate for age.  PSYCH: Mentation appears normal, affect normal/bright, judgement and insight intact, normal speech and appearance  well-groomed.                Video-Visit Details    Video Start Time: 9:00 am    Type of service:  Video Visit    Video End Time:9:15 am    Originating Location (pt. Location): Home    Distant Location (provider location):  [facility]     Platform used for Video Visit: HolaWell    Heron Carlson

## 2022-07-20 LAB
HSV1 IGG SERPL QL IA: >62.2 INDEX
HSV1 IGG SERPL QL IA: ABNORMAL
HSV2 IGG SERPL QL IA: 1.04 INDEX
HSV2 IGG SERPL QL IA: ABNORMAL

## 2022-08-01 ENCOUNTER — TRANSFERRED RECORDS (OUTPATIENT)
Dept: HEALTH INFORMATION MANAGEMENT | Facility: CLINIC | Age: 57
End: 2022-08-01

## 2022-09-18 ENCOUNTER — HEALTH MAINTENANCE LETTER (OUTPATIENT)
Age: 57
End: 2022-09-18

## 2023-01-05 ENCOUNTER — IMMUNIZATION (OUTPATIENT)
Dept: FAMILY MEDICINE | Facility: CLINIC | Age: 58
End: 2023-01-05
Payer: COMMERCIAL

## 2023-01-05 DIAGNOSIS — Z23 HIGH PRIORITY FOR 2019-NCOV VACCINE: ICD-10-CM

## 2023-01-05 DIAGNOSIS — Z23 NEED FOR PROPHYLACTIC VACCINATION AND INOCULATION AGAINST INFLUENZA: ICD-10-CM

## 2023-01-05 PROCEDURE — 90471 IMMUNIZATION ADMIN: CPT

## 2023-01-05 PROCEDURE — 0124A COVID-19 VACCINE BIVALENT BOOSTER 12+ (PFIZER): CPT

## 2023-01-05 PROCEDURE — 99207 PR NO CHARGE NURSE ONLY: CPT

## 2023-01-05 PROCEDURE — 90682 RIV4 VACC RECOMBINANT DNA IM: CPT

## 2023-01-05 PROCEDURE — 91312 COVID-19 VACCINE BIVALENT BOOSTER 12+ (PFIZER): CPT

## 2023-02-01 ENCOUNTER — VIRTUAL VISIT (OUTPATIENT)
Dept: PSYCHOLOGY | Facility: CLINIC | Age: 58
End: 2023-02-01
Payer: COMMERCIAL

## 2023-02-01 DIAGNOSIS — F88 DEVELOPMENTAL MENTAL DISORDER: Primary | ICD-10-CM

## 2023-02-01 DIAGNOSIS — F33.42 MAJOR DEPRESSIVE DISORDER, RECURRENT EPISODE, IN FULL REMISSION (H): ICD-10-CM

## 2023-02-01 PROCEDURE — 90832 PSYTX W PT 30 MINUTES: CPT | Mod: 95 | Performed by: PSYCHOLOGIST

## 2023-02-01 ASSESSMENT — PATIENT HEALTH QUESTIONNAIRE - PHQ9
SUM OF ALL RESPONSES TO PHQ QUESTIONS 1-9: 7
10. IF YOU CHECKED OFF ANY PROBLEMS, HOW DIFFICULT HAVE THESE PROBLEMS MADE IT FOR YOU TO DO YOUR WORK, TAKE CARE OF THINGS AT HOME, OR GET ALONG WITH OTHER PEOPLE: SOMEWHAT DIFFICULT
5. POOR APPETITE OR OVEREATING: NOT AT ALL
SUM OF ALL RESPONSES TO PHQ QUESTIONS 1-9: 7

## 2023-02-01 ASSESSMENT — ANXIETY QUESTIONNAIRES
7. FEELING AFRAID AS IF SOMETHING AWFUL MIGHT HAPPEN: NOT AT ALL
GAD7 TOTAL SCORE: 4
6. BECOMING EASILY ANNOYED OR IRRITABLE: NOT AT ALL
GAD7 TOTAL SCORE: 4
1. FEELING NERVOUS, ANXIOUS, OR ON EDGE: SEVERAL DAYS
5. BEING SO RESTLESS THAT IT IS HARD TO SIT STILL: MORE THAN HALF THE DAYS
2. NOT BEING ABLE TO STOP OR CONTROL WORRYING: SEVERAL DAYS
3. WORRYING TOO MUCH ABOUT DIFFERENT THINGS: NOT AT ALL

## 2023-02-01 NOTE — PROGRESS NOTES
[facility]   Mental Health & Addiction Services     Progress Note - Initial Visit    Client Name:  Lasha Sims Date: 2023         Service Type: Consult Note     Visit Start Time: 9:00am  Visit End Time: 9:36am    Visit #: 1    Attendees: Client attended alone    Service Modality:  Video Visit:      Provider verified identity through the following two step process.  Patient provided:  Patient  and Patient address    Telemedicine Visit: The patient's condition can be safely assessed and treated via synchronous audio and visual telemedicine encounter.      Reason for Telemedicine Visit: Services only offered telehealth    Originating Site (Patient Location): Patient's home    Distant Site (Provider Location): Provider Remote Setting- Home Office    Consent:  The patient/guardian has verbally consented to: the potential risks and benefits of telemedicine (video visit) versus in person care; bill my insurance or make self-payment for services provided; and responsibility for payment of non-covered services.     Patient would like the video invitation sent by:  Send to e-mail at: [email or web address]    Mode of Communication:  Video Conference via [employer]    Distant Location (Provider):  Off-site    As the provider I attest to compliance with applicable laws and regulations related to telemedicine.       DATA:  Extended Session (53+ minutes): No  Interactive Complexity: No   Crisis: No     Presenting Concerns/Current Stressors:   Patient presented to session to initiate the ADHD evaluation process.       PHQ 2022   PHQ-9 Total Score 9 2 7   Q9: Thoughts of better off dead/self-harm past 2 weeks Not at all Not at all Not at all        JESSIKA-7 SCORE 2022   Total Score 6 (mild anxiety) 8 (mild anxiety) -   Total Score 6 8 4       ASSESSMENT:  Mental Status Assessment:  Appearance:   Appropriate   Eye Contact:   Good   Psychomotor  Behavior: Normal   Attitude:   Cooperative   Orientation:   All  Speech   Rate / Production: Normal/ Responsive   Volume:  Normal   Mood:    Normal  Affect:    Appropriate   Thought Content:  Clear   Thought Form:  Logical   Insight:    Good       Safety Issues and Plan for Safety and Risk Management:     [place] Suicide Severity Rating Scale (Lifetime/Recent)  [place] Suicide Severity Rating (Lifetime/Recent) 5/13/2022   1. Wish to be Dead (Lifetime) 0   2. Non-Specific Active Suicidal Thoughts (Lifetime) 0   Actual Attempt (Lifetime) 0   Has subject engaged in non-suicidal self-injurious behavior? (Lifetime) 0   Interrupted Attempts (Lifetime) 0   Aborted or Self-Interrupted Attempt (Lifetime) 0   Calculated C-SSRS Risk Score (Lifetime/Recent) No Risk Indicated     Patient denies current fears or concerns for personal safety.  Patient denies current or recent suicidal ideation or behaviors.  Patient denies current or recent homicidal ideation or behaviors.  Patient denies current or recent self injurious behavior or ideation.  Patient denies other safety concerns.  Recommended that patient call 911 or go to the local ED should there be a change in any of these risk factors.   Patient reports there are no firearms in the house.    Diagnostic Criteria:  F88:  A. A persistent pattern of inattention and/or hyperactivity-impulsivity that interferes with functioning or development, as characterized by (1) and/or (2):   1. Six or more inattention symptoms that have persisted for at least 6 months to a degree that is inconsistent with developmental level and that negatively impacts directly on social and academic/occupational activities.   2. Six or more hyperactivity and impulsivity symptoms that have persisted for at least 6 months to a degree that is inconsistent with developmental level and that negatively impacts directly on social and academic/occupational activities.  B. Several symptoms (inattentive or  hyperactive/impulsive) were present before the age of 12 years.  C. Several symptoms (inattentive or hyperactive/impulsive) present in ?2 settings (eg, at home, school, or work; with friends or relatives; in other activities).  D. There is clear evidence that the symptoms interfere with or reduce the quality of social, academic, or occupational functioning.  E. Symptoms do not occur exclusively during the course of schizophrenia or another psychotic disorder, and are not better explained by another mental disorder (eg, mood disorder, anxiety disorder, dissociative disorder, personality disorder, substance intoxication, or withdrawal).      DSM5 Diagnoses: (Sustained by DSM5 Criteria Listed Above)  Diagnoses:   1. Developmental mental disorder    2. Major depressive disorder, recurrent episode, in full remission (H)      Psychosocial & Contextual Factors: social support, employed, recently   WHODAS 2.0 (12 item):   WHODAS 2.0 Total Score 5/13/2022 2/1/2023   Total Score 27 18   Total Score MyChart 27 18       PROMIS-10 Scores  Global Mental Health Score: 14  Global Physical Health Score: 20   PROMIS TOTAL - SUBSCORES: 34      Intervention:              Reviewed symptoms and history of presenting concern. Patient endorsed symptoms consistent with ADHD. Patient reported history of depression and anxiety that are well-managed with Prozac - symptoms in remission. Patient denied symptoms associated with malcolm, panic, OCD, trauma and perceptual difficulties. Unable to complete diagnostic intake, will be completed in next session.   CBT: socratic questioning, positive reinforcement  EFT: empathetic attunement, emotion checking, emotion naming  MI: open ended questions, affirmations, reflections        Attendance Agreement:  Client has not signed the attendance agreement. Discussed expectations at beginning of this first session and patient agreed.       PLAN:  Provider will continue Diagnostic Assessment in next  session. Patient will complete Amber questionnaires  and CNS Vital Signs prior to next session (2/8/2023).    Patient meets the following risk assessment and triage: Patient denied any current/recent/lifetime history of suicidal ideation and/or behaviors.  No safety plan indicated at this time.     Medical necessity criteria is warranted in order to: Measure a psychological disorder and its severity and functional impairment to determine psychiatric diagnosis when a mental illness is suspected, or to achieve a differential diagnosis from a range of medical/psychological disorders that present with similar constellations of symptoms (e.g., determination and measurement of anxiety severity and impact in the presence of ongoing asthma or heart disease), Perform symptom measurement to objectively measure treatment effectiveness and/or determine the need to refer for pharmacological treatment or other medical evaluation (e.g., based on severity and chronicity of symptoms) and Evaluate primary symptoms of impaired attention and concentration that can occur in many neurological and psychiatric conditions.    Medical necessity for psychological assessment is warranted as a result of the following: (1) A specific clinical question is posed that relates to the condition/symptoms being addressed (2) The question cannot be adequately addressed by clinical interview and/or behavioral observation (3) Results of psychological testing are reasonably expected to provide an answer to the query (4) It is reasonably expected that the testing will provide information leading to a clearer diagnosis and/or guide treatment planning with an expectation of improved clinical outcome.    I acknowledge that, based upon current clinical information, the patient and I have reviewed and discussed issues pertaining to the purpose of therapy/testing, potential therapeutic goals, procedures, risks and benefits, and estimated duration of  therapy/testing. Issues pertaining to fees/insurance and confidentiality were also addressed with the patient, who indicated understanding and elected to continue with appointments. I will not be providing any experimental procedures and, if we agree that a change in clinical procedure would be more beneficial, I will obtain specific consent for that procedure or refer you to another provider who has expertise in that area.       Viridiana Rowley PsyD,   Clinical Psychologist

## 2023-02-08 ENCOUNTER — VIRTUAL VISIT (OUTPATIENT)
Dept: PSYCHOLOGY | Facility: CLINIC | Age: 58
End: 2023-02-08
Payer: COMMERCIAL

## 2023-02-08 DIAGNOSIS — F88 DEVELOPMENTAL MENTAL DISORDER: Primary | ICD-10-CM

## 2023-02-08 DIAGNOSIS — F33.42 MAJOR DEPRESSIVE DISORDER, RECURRENT EPISODE, IN FULL REMISSION (H): ICD-10-CM

## 2023-02-08 PROCEDURE — 90791 PSYCH DIAGNOSTIC EVALUATION: CPT | Mod: VID | Performed by: PSYCHOLOGIST

## 2023-02-08 ASSESSMENT — PATIENT HEALTH QUESTIONNAIRE - PHQ9
SUM OF ALL RESPONSES TO PHQ QUESTIONS 1-9: 2
10. IF YOU CHECKED OFF ANY PROBLEMS, HOW DIFFICULT HAVE THESE PROBLEMS MADE IT FOR YOU TO DO YOUR WORK, TAKE CARE OF THINGS AT HOME, OR GET ALONG WITH OTHER PEOPLE: NOT DIFFICULT AT ALL
SUM OF ALL RESPONSES TO PHQ QUESTIONS 1-9: 2

## 2023-02-08 NOTE — PROGRESS NOTES
[facility]  Provider Name:  Viridiana Rowley     Credentials:  WINSTON Altman    PATIENT'S NAME: Lasha Sims  PREFERRED NAME: Al  PRONOUNS: he/him  MRN: [id number]  : 1965  ADDRESS: [address]  [facility] [address]  ACCT. NUMBER:  [id number]  DATE OF SERVICE: 23  START TIME: 2:00pm  END TIME: 2:45pm  PREFERRED PHONE: [phone]  SERVICE MODALITY:  Video Visit:      Provider verified identity through the following two step process.  Patient provided:  Patient  and Patient address    Telemedicine Visit: The patient's condition can be safely assessed and treated via synchronous audio and visual telemedicine encounter.      Reason for Telemedicine Visit: Services only offered telehealth    Originating Site (Patient Location): Patient's home    Distant Site (Provider Location): Provider Remote Setting- Home Office    Consent:  The patient/guardian has verbally consented to: the potential risks and benefits of telemedicine (video visit) versus in person care; bill my insurance or make self-payment for services provided; and responsibility for payment of non-covered services.     Patient would like the video invitation sent by:  Send to e-mail at: [email or web address]    Mode of Communication:  Video Conference via [employer]    Distant Location (Provider):  Off-site    As the provider I attest to compliance with applicable laws and regulations related to telemedicine.    [place] ADULT Mental Health DIAGNOSTIC ASSESSMENT    Identifying Information:  Patient is a 57 year old,  man. The pronoun use throughout this assessment reflects the patient's chosen pronoun. Patient was referred for an assessment by Pal Wilks MD. Patient attended the session alone.     Chief Complaint:   Patient reported seeking services at this time for diagnostic assessment and recommendations for treatment. Patient's presenting concerns include: Difficulties focusing and staying on task.  The  "patient reported that he has always been easily sidetracked. Specifically, the patient reported experiencing the following symptoms: difficulty sustaining attention, problems listening when spoken to directly, not following through with tasks, avoiding tasks that require sustained mental effort, being forgetful and is fidgety.     Patient reported that he has not been assessed for ADHD in the past. Symptoms reportedly began in childhood. The patient reported a history of depression and anxiety symptoms that developed in adulthood.  The patient reported that medication, exercise, therapy, and implementing coping skills has been helpful to keep symptoms at bay. Client reported that other professional(s) are involved in providing services, as he was referred by his PCP.    Social/Family History:  Patient reported that he was born in [place] and moved to [place] when he was very young.  The patient's family then moved to [place] when he was in middle school.  Patient was the third born of 4 children. There are no known complications during pregnancy or delivery.  The patient's parents remained  until his father passed away in 2001. Patient reported no difficulty with childhood peer relationships despite the frequent moves and school switches that he experienced throughout elementary and middle school, he indicated that he was always able to make friends easily. Reported that childhood felt mostly normal.     The patient denied a history of learning disorders, special education programming, and receiving tutoring services. Patient denied a history of head injuries during childhood (did sustain 1 concussion at 45 years old while skiing and had LOC during that event). As a child, he reported being a poor student from the \"[employer]\" and had difficulties following orders.  He further recalled problems with organization, being disruptive in class and at the kitchen table, misplacing coats, and not completing " "homework.  He indicated that these problems persisted elementary through high school. Recalled academic strengths in writing as well as weaknesses in math. The patient's highest education level is college graduate.  After high school, the patient began college at the [employer] and withdrew several times before eventually transferring to the [employer] graduating there.  He indicated that in total, took him 8 years to complete his bachelor's degree.  He described himself as \"hard to stay focused and hard to stay in 1 place.\"    The patient describes his cultural background as White, American.  Cultural influences and impact on patient's life structure, values, norms, and healthcare: Cartesian. Patient identified his preferred language to be English. Patient reported he does not need the assistance of an  or other support involved in therapy.     Patient is currently single.  He indicated that he  his ex-wife after many years of marriage last year. Patient identified their sexual orientation as heterosexual. Patient reported having three adult children. Patient identified friends as part of his support system. Patient identified the quality of these relationships as stable and meaningful.      Patient is staying in own home/apartment. He lives alone. Housing is stable.     Patient is currently employed full-time as an .  The patient indicated he has been in the field for the past 30 years and worked in his current position for the past 8 years.  He reported feeling as though he needs to put in twice the amount of effort in order to complete tasks and has difficulty staying focused. Patient reports finances are obtained through employment.     Patient has not served in the .     Patient reported that he has not been involved with the legal system. Patient denies being on probation / parole / under the jurisdiction of the court.    Patient " has received a 's license. Patient denied problems with inattention/hyperactivity while driving.    Patient's Strengths and Limitations:  Patient identified the following strengths or resources that will help them succeed in treatment: exercise routine, friends / good social support, insight, intelligence, positive work environment, motivation, strong social skills and work ethic. Things that may interfere with the patient's success in treatment include: none identified.     Personal and Family Medical History:  Patient reported the following biological family members or relatives with mental health issues: Sister experienced a Bipolar Disorder and his other sister experienced depression.  Patient previously received the following mental health diagnosis: an Anxiety Disorder and Depression.   Patient has received the following mental health services in the past: counseling and psychiatry.   Patient is currently receiving the following services: medication(s) from physician / PCP.  Hospitalizations: None.   Previous/Current commitments: None.     Patient has had a physical exam to rule out medical causes for current symptoms. Date of last physical exam was 7/19/2022. The patient's PCP is Pal Wilks MD. Patient reported no current medical concerns. Patient denies any issues with pain.. There are not significant appetite/nutritional concerns/weight changes.    Current Outpatient Medications   Medication     busPIRone (BUSPAR) 10 MG tablet     FLUoxetine (PROZAC) 20 MG capsule     simvastatin (ZOCOR) 10 MG tablet     tadalafil (CIALIS) 10 MG tablet     No current facility-administered medications for this visit.       Client reports taking prescribed medications as prescribed.    Patient Allergies: No Known Allergies    Medical History:   Past Medical History:   Diagnosis Date     Depressive disorder 2001     Erectile dysfunction due to diseases classified elsewhere 2/10/2022     Generalized anxiety disorder  1990     Inappropriate sinus tachycardia 3/4/2021     Major depression, chronic      Mild hyperlipidemia      Osteoarthritis of left knee      Other neutropenia (H) 2/6/2020     S/P total knee arthroplasty, left 3/12/2021     Spontaneous pneumothorax 10/2010    Right side       Family history includes: family history includes Brain Cancer (age of onset: 60) in his paternal grandfather; Depression in his daughter; Hyperlipidemia in his mother; Multiple myeloma (age of onset: 60) in his father; Other Cancer in his father; Prostate Cancer (age of onset: 50) in his brother.    Current Mental Status Exam:   Appearance:  Appropriate    Eye Contact:  Good   Psychomotor:  Normal       Gait / station:  no problem  Attitude / Demeanor: Cooperative   Speech      Rate / Production: Normal/ Responsive      Volume:  Normal  volume      Language:  intact  Mood:   Normal  Affect:   Appropriate    Thought Content: Clear   Thought Process: Logical       Associations: No loosening of associations  Insight:   Good   Judgment:  Intact   Orientation:  All  Attention/concentration: Good    Rating Scales:  PHQ9:    PHQ-9 SCORE 7/18/2022 2/1/2023 2/8/2023   PHQ-9 Total Score MyChart 2 (Minimal depression) 7 (Mild depression) 2 (Minimal depression)   PHQ-9 Total Score 2 7 2       GAD7:    JESSIKA-7 SCORE 2/9/2022 5/9/2022 2/1/2023   Total Score 6 (mild anxiety) 8 (mild anxiety) -   Total Score 6 8 4       Substance Use:  Patient did report a family history of substance use concerns; see medical history section for details. Patient has not received chemical dependency treatment in the past. Patient has not ever been to detox. Patient is not currently receiving any chemical dependency treatment. Patient reported no problems as a result of his substance use.    Alcohol: Patient reported using alcohol excessively in later high school years and early college years.  He indicated that use has been casual for the remainder of his adult life.  He  "indicated that he will have a \"couple beers\" in social settings about 1-2 times per month.  Nicotine: None.  Cannabis: Patient reported occasional use until he was about 50 years old.  He reported that use never occurred at home or around his children.  Usually occur during certain occasions such as skiing or snowboarding.  Caffeine: About 1 cup of coffee per day.  Street Drugs: Experimentation with cocaine and LSD in the past.  Prescription Drugs: None.    CAGE: None of the patient's responses to the CAGE screening were positive / Negative CAGE score     Substance Use: No symptoms    Based on the negative CAGE score and clinical interview there are not indications of drug or alcohol abuse.    Significant Losses/Trauma/Abuse/Neglect Issues:   There are indications or report of significant loss, trauma, abuse or neglect issues related to: are no indications and client denies any losses, trauma, abuse, or neglect concerns.  Concerns for possible neglect are not present.    Safety Assessment:   [place] Suicide Severity Rating Scale (Lifetime/Recent)  [place] Suicide Severity Rating Scale (Lifetime/Recent)  [place] Suicide Severity Rating (Lifetime/Recent) 5/13/2022   1. Wish to be Dead (Lifetime) 0   2. Non-Specific Active Suicidal Thoughts (Lifetime) 0   Actual Attempt (Lifetime) 0   Has subject engaged in non-suicidal self-injurious behavior? (Lifetime) 0   Interrupted Attempts (Lifetime) 0   Aborted or Self-Interrupted Attempt (Lifetime) 0   Calculated C-SSRS Risk Score (Lifetime/Recent) No Risk Indicated     Patient denies current homicidal ideation and behaviors.  Patient denies current self-injurious ideation and behaviors.    Patient denied risk behaviors associated with substance use.  Patient denies any high risk behaviors associated with mental health symptoms.  Patient reports the following current concerns for their personal safety: None.  Patient reports there are not firearms in the house.     History " of Safety Concerns:  Patient denied a history of homicidal ideation.     Patient denied a history of personal safety concerns.    Patient denied a history of assaultive behaviors.    Patient denied a history of sexual assault behaviors.     Patient denied a history of risk behaviors associated with substance use.  Patient denies any history of high risk behaviors associated with mental health symptoms.  Patient reports the following protective factors: forward or future oriented thinking; dedication to family or friends; regular physical activity; sense of belonging; abstinence from substances; adherence with prescribed medication; daily obligations; commitment to well being; sense of meaning; financial stability    Risk Plan:  See Recommendations for Safety and Risk Management Plan below.    Review of Patient-Reported Symptoms:  Depression: Lack of interest, Difficulties concentrating, Psychomotor slowing or agitation and Feeling sad, down, or depressed  Ligia:  No Symptoms  Psychosis: No Symptoms  Anxiety: Nervousness and Poor concentration  Panic:  No symptoms  Post Traumatic Stress Disorder:  No Symptoms   Eating Disorder: No Symptoms  ADD / ADHD:  Inattentive, Poor task completion, Distractibility, Restlessness/fidgety and Hyperactive  Conduct Disorder: No symptoms  Autism Spectrum Disorder: No observed symptoms. An autism spectrum disorder diagnosis requires specialized assessment.  Obsessive Compulsive Disorder: No Symptoms  Patient reports the following compulsive behaviors and treatment history: No symptoms.      Diagnostic Criteria:   F88:  A. A persistent pattern of inattention and/or hyperactivity-impulsivity that interferes with functioning or development, as characterized by (1) and/or (2):   1. Six or more inattention symptoms that have persisted for at least 6 months to a degree that is inconsistent with developmental level and that negatively impacts directly on social and academic/occupational  activities.   2. Six or more hyperactivity and impulsivity symptoms that have persisted for at least 6 months to a degree that is inconsistent with developmental level and that negatively impacts directly on social and academic/occupational activities.  B. Several symptoms (inattentive or hyperactive/impulsive) were present before the age of 12 years.  C. Several symptoms (inattentive or hyperactive/impulsive) present in ?2 settings (eg, at home, school, or work; with friends or relatives; in other activities).  D. There is clear evidence that the symptoms interfere with or reduce the quality of social, academic, or occupational functioning.  E. Symptoms do not occur exclusively during the course of schizophrenia or another psychotic disorder, and are not better explained by another mental disorder (eg, mood disorder, anxiety disorder, dissociative disorder, personality disorder, substance intoxication, or withdrawal).    F33.42:  A. Five (or more) symptoms have been present during the same 2-week period and represent a change from previous functioning; at least one of the symptoms is either (1) depressed mood or (2) loss of interest or pleasure.   1. Depressed mood.   2. Diminished interest or pleasure in all, or almost all, activities.   3. Diminished ability to think or concentrate, or indecisiveness.   4. Psychomotor agitation or lethargy.   B. The symptoms cause clinically significant distress or impairment in social, occupational, or other important areas of functioning.  C. The episode is not attributable to the physiological effects of a substance or to another medical condition.  D. The occurence of major depressive episode is not better explained by other thought / psychotic disorders.  E. There has never been a manic episode or hypomanic episode.       Functional Status:  Patient reports the following functional impairments: management of the household and or completion of tasks and work / vocational  responsibilities.     WHODAS:   WHODAS 2.0 Total Score 5/13/2022 2/1/2023   Total Score 27 18   Total Score MyChart 27 18     PROMIS-10:   Global Mental Health Score: (P) 13  Global Physical Health Score: (P) 20   PROMIS TOTAL - SUBSCORES: (P) 33   Nonprogrammatic care: Patient is requesting basic services to address current mental health concerns.    Clinical Summary:  1. Reason for assessment: assessing reported deficits in executive functioning (rule in/out ADHD).  2. Psychosocial, cultural and contextual factors: Social support, employed full-time.  3. Principal DSM-5 diagnoses (Sustained by DSM5 Criteria Listed Above) and other diagnoses relevant to this service:   1. Developmental mental disorder    2. Major depressive disorder, recurrent episode, in full remission (H)      4. Prognosis: Expect Improvement.  5. Likely consequences of symptoms if not treated: Continue difficulties with inattention.  6. Client strengths include: educated, employed, has a previous history of therapy, insightful, intelligent, motivated, support of family, friends and providers and supportive .     Recommendations:   Patient will complete self-report Amber questionnaire.  Other-report questionnaire and CNS vital signs completed.  Feedback session will be scheduled after this is received.    1. Plan for Safety and Risk Management:  Safety and Risk: Recommended that patient call 911 or go to the local ED should there be a change in any of these risk factors..         Report to child / adult protection services was NA.     2. Patient's identified mental health concerns with a cultural influence will be addressed in final recommendations.     3. Initial Treatment will focus on: adhd testing. See above.      4. Resources/Service Plan:    services are not indicated.   Modifications to assist communication are not indicated.   Additional disability accommodations are not indicated.      5. Collaboration:   Collaboration /  coordination of treatment will be initiated with the following  support professionals: primary care physician.      6.  Referrals:   The following referral(s) will be initiated: N/A.    A Release of Information has been obtained for the following: N/A.   Emergency Contact was not obtained.    Clinical Substantiation/medical necessity for the above recommendations:     Medical necessity criteria is warranted in order to: Measure a psychological disorder and its severity and functional impairment to determine psychiatric diagnosis when a mental illness is suspected, or to achieve a differential diagnosis from a range of medical/psychological disorders that present with similar constellations of symptoms (e.g., determination and measurement of anxiety severity and impact in the presence of ongoing asthma or heart disease), Perform symptom measurement to objectively measure treatment effectiveness and/or determine the need to refer for pharmacological treatment or other medical evaluation (e.g., based on severity and chronicity of symptoms) and Evaluate primary symptoms of impaired attention and concentration that can occur in many neurological and psychiatric conditions.    Medical necessity for psychological assessment is warranted as a result of the following: (1) A specific clinical question is posed that relates to the condition/symptoms being addressed (2) The question cannot be adequately addressed by clinical interview and/or behavioral observation (3) Results of psychological testing are reasonably expected to provide an answer to the query (4) It is reasonably expected that the testing will provide information leading to a clearer diagnosis and/or guide treatment planning with an expectation of improved clinical outcome.    7. SHANTAL:    SHANTAL: N/A.    8. Records:   These were reviewed at time of assessment.   Information in this assessment was obtained from the medical record and provided by patient who is a good  historian. Patient will have open access to their mental health medical record.    9. Interactive Complexity: No     Parts of this documentation may have been completed using dictation software. Potential errors may result and are unintentional.       Viridiana Rowley PsyD, LP  February 8, 2023

## 2023-03-13 ASSESSMENT — PATIENT HEALTH QUESTIONNAIRE - PHQ9
SUM OF ALL RESPONSES TO PHQ QUESTIONS 1-9: 6
SUM OF ALL RESPONSES TO PHQ QUESTIONS 1-9: 6
10. IF YOU CHECKED OFF ANY PROBLEMS, HOW DIFFICULT HAVE THESE PROBLEMS MADE IT FOR YOU TO DO YOUR WORK, TAKE CARE OF THINGS AT HOME, OR GET ALONG WITH OTHER PEOPLE: SOMEWHAT DIFFICULT

## 2023-03-14 ENCOUNTER — VIRTUAL VISIT (OUTPATIENT)
Dept: PSYCHOLOGY | Facility: CLINIC | Age: 58
End: 2023-03-14
Payer: COMMERCIAL

## 2023-03-14 DIAGNOSIS — F33.42 MAJOR DEPRESSIVE DISORDER, RECURRENT EPISODE, IN FULL REMISSION (H): ICD-10-CM

## 2023-03-14 DIAGNOSIS — F90.0 ATTENTION DEFICIT HYPERACTIVITY DISORDER, INATTENTIVE TYPE, MILD: Primary | ICD-10-CM

## 2023-03-14 DIAGNOSIS — F41.9 ANXIETY: ICD-10-CM

## 2023-03-14 PROCEDURE — 96130 PSYCL TST EVAL PHYS/QHP 1ST: CPT | Mod: VID | Performed by: PSYCHOLOGIST

## 2023-03-14 PROCEDURE — 96131 PSYCL TST EVAL PHYS/QHP EA: CPT | Mod: VID | Performed by: PSYCHOLOGIST

## 2023-03-14 NOTE — PATIENT INSTRUCTIONS
Coping with Attention-Deficit/Hyperactivity Disorder (ADHD)    If you have ADHD, it can be hard to sit still, pay attention or control impulsive behavior. ADHD can cause problems at home, at school, at work and in social settings. But you can learn ways to control your symptoms. Below are some ideas for coping with the most common ADHD problems.     Memory, Focus, and Managing Time  Be mindful of time. Use a watch, phone, timer, alarm, PDA or computer--anything that keeps accurate time that you can see and hear at all times. Set more than one alarm to remind you how much time you have left for a task. Give yourself more time than you think you need. For important appointments or deadlines, set reminder alarms hours or days ahead of time.   Use a day planner. A day planner can help you manage time and remember responsibilities. Learning to use a planner is just like learning to use any tool--practice makes perfect.   Use lists. Lists and notes can help you keep track of regular tasks, projects, deadlines and appointments. If you decide to use a daily planner, keep all lists and notes inside of it. Use color codes for tasks so you know which ones are the most important.  Learn to say no and set boundaries. Do not take on too many projects or social engagements. Overbooking yourself can be overwhelming and can lead to missed commitments.  Repeat out loud the instructions you have been given. This will help you remember, as well as let others correct you if needed.    Organization  Create space. Ask yourself what you need on a daily basis. Then, find storage bins or closets for things you don t need every day. Have specific areas for things like keys, bills and other items that are easy to misplace. Throw away or donate things you don t need.   Deal with it now. You can avoid forgetfulness, clutter and procrastination by doing things right away, not some time in the future. This includes filing papers, cleaning up  messes, opening and responding to mail and returning phone calls.  Set up a filing system. Use dividers or separate file folders for different types of documents, such as medical records, receipts and pay stubs. Label and color-code your files so that you can quickly find what you need.   Break larger tasks into small, manageable pieces. Write down the steps needed to complete the task. Follow each step in order until the task is done. If needed, take small, timed breaks and return to finish the task.  Organize your work space. Use lists or planners to organize your day. Remove clutter from your desk. Label any storage bins. Reduce distraction as much as possible. Ideas include sitting facing the wall, closing your door or using a white noise machine.    Sitting Still  Move around as needed. Don t fight the urge to move around. If you need to fidget or stand up for a period of time to help maintain attention, then do so. But take care that you re not interrupting others. You may also find it helpful to squeeze a stress ball.  Be active in a useful way. Exercise can burn off extra energy, relieve stress, boost your mood and calm your mind. Meditation, yoga or justin chi can help you better control your attention and impulses.  Stay healthy. Get enough sleep. Avoid caffeine late in the day. Exercise. Create a relaxing bedtime routine. Stick to a schedule or daily routine. Eat small meals throughout the day. Avoid sugar, eat fewer carbohydrates and eat more protein.     Close Relationships  Talk to your loved ones about ADHD. There are many resources available that can help your loved one understand ADHD. See the Resources section below.  Divide daily tasks based on each person s strengths. For example, if you have trouble with organization, you may not want to do financial planning tasks.  If you have trouble with focus, develop a sign that others in your life can use as a gentle reminder to pay attention. The sign  should be small but meaningful to you and the other person.  Improve communication. Use a dry erase board in a common area to help the whole family stay in touch better. Keep regular to-do lists and compare scheduled activities every day. Writing notes to each other is also very helpful.  Be an active listener and try not to interrupt. If you find your mind wandering when others are talking, mentally repeat their words so you can follow the conversation. Ask questions and ask people to repeat what they said if needed. Pay close attention to body language.   Organize your thoughts. If you need to have a serious conversation, write a list of the points you would like to make or the important ideas you want to talk about. This can help you stay focused and remember what you need to say.  Think before speaking. It can be hard to control your impulses when you feel strongly about something. Before saying whatever pops into your head, stop to ask yourself  Will this be helpful?  or  Will this help me get what I want?   Take charge of your life. Work to accept that some things are harder for you. Make a plan to address these troubles and seek the support you need.    Online Resources  ADD [employer]. http://www.[employer].com  ADHD and You.  Tips for Adults with ADHD.  http://www.adhdandyou.com/adhd-patient/adhd-tips-young-adult/  Attention Deficit Disorder Association. http://www.add.org  Attention Deficit Disorder Resources. http://www.addresources.org  Children and Adults with Attention-Deficit/Hyperactivity Disorder (SERGEI). http://www.sergei.org/  Myra Matias.  33 Ways to Get Organized with Adult ADHD.  http://www.additudemag.com/adhd/article/729.html  National Resource Center on ADHD. http://www.nuot7uvfv.org/  Margarita Elena.  Daily Living Tips for Adult ADHD.  http://www.medicinenet.com/living_tips_adult_adhd_pictures_slideshow/article.htm  Thom Wright and Consuelo Gusman.  Help for Adult ADD / ADHD.   http://www.helpguide.org/mental/adhd_add_adult_strategies.htm  You can also find many apps for your phone that can help you with common ADHD struggles    Book Resources  Gerson Clark. ADHD in Adults. (2008).  Gerson Clark. Taking Charge of Adult ADHD. (2010).  Tang Almodovar. Delivered from Distraction. (2006).  Tang Almodovar. Driven to Distraction. (2011).  Marisol Aguilar. ADD in the Workplace. (1997).  Marisol Aguilar. ADD-Friendly Ways to Organize Your Life. (2002).  Beata Quintero. The ADHD Effect on Marriage: Understand and Rebuild Your Relationship in Six Steps. (2010).  Elissa Kamara and Mojgan Alvarado. You Mean I m Not Lazy, Stupid or Crazy? (2006).  Jimmie Marcial. Understand Your Brain, Get More Done: The ADHD Executive Functions Workbook. (2012).    Support Groups  ADHD Adult Support Group  [address].  7:00 to 8:30 p.m., last Thursday of each month (except December).  Contact/RSVP: [email or web address]    ADHD Adult Support Group  [address].  Thursday 10:00 a.m. to 12:00 p.m. or 7:00 to 9:30 p.m.  Contact/RSVP: Timothy Barrett. [phone] or [email or web address]     ADHD Adult Support Group for Spouses/Partners of adults with ADHD  [address].  Tuesday 12:00 to 2:00 p.m.   Contact/RSVP: Timothy Barrett. [phone] or [email or web address]

## 2023-03-14 NOTE — Clinical Note
Hello,  I wanted to let you know that I have completed the ADHD assessment with this patient.  As you will see the report, data do support an ADHD diagnosis.  I encouraged him to make an appointment with you soon to discuss medication options.  Please let me know if you have any questions or concerns!  Thanks!   Viridiana

## 2023-03-14 NOTE — PROGRESS NOTES
"    Psychological Assessment Report    Patient: Lasha Sims (Al)  YOB: 1965  MRN: [id number]  Date(s) of assessment: 2/1/2023 and 2/8/2023  Referral Source: Pal Wilks MD - [facility]   Reason for Referral: assessing reported deficits in executive functioning     IDENTIFYING INFORMATION AND BRIEF HISTORY OF PRESENTING PROBLEM: Marvin Sims is a 57-year-old White man who presented to the initial diagnostic intake appointments on 2/1/2023 and 2/8/2023 (see diagnostic intake dated 2/8/2023 for more detailed background information) due to primary concerns with focusing and staying on task.  The patient reported that he is easily sidetracked and becomes distracted by \"random things\" while working.    As a child, the patient reported that he was a \"poor student from the get-go\" and did not follow orders.  He further recalled some problems with disorganization, being disruptive in school and at the kitchen table, and often losing coats.  The patient went on to describe that he often did not complete homework and earned poor grades as a result.  These problems continued elementary through high school.  After high school, the patient began college at the [employer] [place] before withdrawing and eventually completing his bachelor's degree at the [employer].  In total, it took 8 years to complete as a result of difficulties focusing, being distracted, working simultaneously, and using alcohol excessively.  The patient reported that his first child was born while he was in college and this was a motivator to help him finish.  He stated that he would need to shot himself away in the library in order to complete tasks.  He has worked as an  since 1991.  He feels as though he needs to put in twice the amount of effort as peers and has difficulty staying focused and completing tasks.  The patient explained that he is a \"high achiever\" and " "performs his job well, so coworkers do not know about the extra effort that he is putting in.  Currently, the patient reported experiencing the following symptoms: Difficulty sustaining attention (unable to read a tech report but is able to watch a movie), does not follow through with tasks, avoids/dislikes tasks that require sustained mental effort (procrastination related to challenging work topics), is easily distracted, is fidgety, and feels the need to get up and move around.  Overall, he described that it takes him a long time to complete things because he starts and stops frequently.  The patient is seeking diagnostic clarification and updated treatment recommendations.    Mental Health History: The patient reported a history of depression and anxiety symptoms.  He indicated that medication, exercise, and implementing coping skills has been helpful in managing symptoms.  The patient denied a history of manic symptoms, social anxiety, phobic responses, symptoms of obsessive-compulsive disorder, trauma, and perceptual difficulties. The patient denied issues with sexual compulsivity, gambling, and disordered eating.    Developmental History: The patient reported that he believes that he is the product of a full-term pregnancy and there were no complications during his mother's pregnancy or birth. The patient reported that he believes he met all of his developmental milestones on time. He denied a history of  learning disorders and special educational programming. The patient recalled academic strengths in writing an older years as well as weaknesses in math.  The patient reported that he grew up with his mother, father, older sister, older brother, and younger sister in the home.  His parents remain  until his father passed away in 2001.  The patient described that he was occasionally Sand a bit by his siblings but generally had a \"normal upbringing.\"  He recalled making friends easily and being " "\"malleable\" when he switched schools a variety of times.    Chemical Dependence/Substance Abuse History: The patient denied a history of chemical or substance abuse.     SOURCES OF DATA/ASSESSMENT: Review of medical and psychiatric records, consideration of behavioral observations during the testing (if applicable), and the results of the psychological tests are all considered in the preparation of this psychological test report. It is important to note that test results comprise a hypothesis of the patient s mental health concerns and are not an independent or conclusive assessment. Test results are combined with the patient s available medical, psychological, behavioral data for an integrated interpretation and report. Due to virtual/remote administration, certain aspects of the assessment process were impacted, such as access to direct patient observation, and maintaining an environment conducive to testing. As such, external factors have the potential to affect the validity of data collected.    TESTS ADMINISTERED:    CNS Vital Signs Neurocognitive Battery    Amber Adult ADHD Rating Scale-IV: Self and Other Reports (BAARS-IV)    Amber Functional Impairment Scale: Self and Other Reports (BFIS)    Amber Deficits in Executive Functioning Scale (BDEFS)    Generalized Anxiety Disorder Questionnaire (JESSIKA-7)    Patient Health Questionnaire- 9 (PHQ-9)    BEHAVIORAL OBSERVATIONS: The patient was pleasant and cooperative throughout all interview and explanation of testing process. The patient was oriented to person, place, and time. Mood was neutral. Eye contact was adequate and speech was at normal rate and rhythm. Motor activity was appropriate. Due to virtual/remote administration, direct patient observation was not possible during the testing process, and it is unknown if the patient was able to maintain an environment conducive to testing. As such, external factors have the potential to affect the validity of " data collected.     TEST RESULTS: Test results comprise a hypothesis of the patient s mental health concerns and are not an independent or conclusive assessment. Test results are combined with the patient s available medical, psychological, behavioral, and observational data for an integrated interpretation and report.    CNS Vital Signs Neurocognitive Battery  The CNS Vital Signs Neurocognitive Battery is a remotely-administered assessment comprised of seven core subtests to individually measure the patient's verbal memory, visual memory, motor speed, psychomotor speed, reaction time, focus, ability to sustain attention and ability to adapt to changing rules and tasks.      Above average domain scores indicate a standard score (SS) greater than 109 or a Percentile Rank (NJ) greater than 74, indicating a high functioning test subject. Average is a SS  or NJ 25-74, indicating normal function. Low Average is a SS 80-89 or NJ 9-24 indicating a slight deficit or impairment. Below Average is a SS 70-79 or NJ 2-8, indicating a moderate level of deficit or impairment. Very Low is a SS less than 70 or a NJ less than 2, indicating a deficit and impairment.  Validity Indicator denotes a guideline for representing the possibility of an invalid test or domain score, and can be influenced by patient understanding, effort, or other conditions.    The patient s results are detailed below:    Domain Standard Score Percentile Description Validity   Neurocognitive Index 103 58 Average Yes   Composite Memory Measure 118 88 Above Average Yes   Verbal Memory 106 66 Average Yes   Visual Memory 122 93 Above Average Yes   Psychomotor Speed 92 30 Average Yes   Reaction Time 92 30 Average Yes   Complex Attention 115 84 Above Average Yes   Cognitive Flexibility 100 50 Average Yes   Processing Speed 94 34 Average Yes   Executive Function 99 47 Average Yes   Reasoning 126 96 Above Average Yes   Working Memory 88 21 Low Average Yes    Sustained Attention  96 40 Average Yes   Simple Attention 10 68 Average Yes   Motor Speed 94 34 Average Yes     Neurocognitive Index (NCI): Measures an average score derived from the domain scores or a general assessment of the overall neurocognitive status of the patient. The patient's NCI score is 103, with a percentile of 58, and falls within the average range.    Composite Memory: Measures how well subject can recognize, remember, and retrieve words and geometric figures, and is comprised of the Visual and Verbal Memory domains. The patient's Composite Memory score is 118, with a percentile of 88, and falls within the above average range.    Verbal Memory: Measures how well subject can recognize, remember, and retrieve words. The patient's Verbal Memory score is 106, with a percentile of 66, and falls within the average range.    Visual Memory: Measures how well subject can recognize, remember and retrieve geometric figures. The patient's Visual Memory score is 122, with a percentile of 93, and falls within the above average range.    Psychomotor Speed: Measures how well a subject perceives, attends, responds to complex visual-perceptual information and performs simple fine motor coordination, and is comprised of the Motor Speed and Processing Speed indexes. The patient's Psychomotor Speed score is 92, with a percentile of 30, and falls within the average range.    Reaction Time: Measures how quickly the subject can react, in milliseconds, to a simple and increasingly complex direction set. The patient's Reaction Time score is 92, with a percentile of 30, and falls within the average range.    Complex Attention: Measures the ability to track and respond to a variety of stimuli over lengthy periods of time and/or perform complex mental tasks requiring vigilance quickly and accurately. The patient's Complex Attention score is 115, with a percentile of 84, and falls within the above average range.    Cognitive  Flexibility: Measures how well subject is able to adapt to rapidly changing and increasingly complex set of directions and/or to manipulate the information. The patient's Cognitive Flexibility score is 100, with a percentile of 50, and falls within the average range.    Processing Speed: Measures how well a subject recognizes and processes information i.e., perceiving, attending/responding to incoming information, motor speed, fine motor coordination, and visual-perceptual ability. The patient's Processing Speed score is 94, with a percentile of 34, and falls within the average range.    Executive Function: Measures how well a subject recognizes rules, categories, and manages or navigates rapid decision making. The patient's Executive Function score is 99, with a percentile of 47, and falls within the average range.    Reasoning: Measures how well a subject can perceive and understand the meaning of visual or abstract information and recognizing relationships between visual-abstract concepts. The patient's Reasoning score is 126, with a percentile of 96, and falls in the above average range.     Working Memory: Measures how well a subject can perceive and attend to symbols using short-term memory processes. Also measures the ability to carry out short-term memory tasks that support decision making, problem solving, planning, and execution. The patient's Working Memory score is 88, with a percentile of 21, and falls in the low average range.    Sustained Attention: Measures how well a subject can direct and focus cognitive activity on specific stimuli. Also measurs how well a subject can focus and complete task or activity, sequence action, and focus during complex thought. The patient's Sustained Attention score is 96, with a percentile of 40, and falls in the average range.    Simple Attention: Measures the ability to track and respond to a single defined stimulus over lengthy periods of time while performing  "vigilance and response inhibition quickly and accurately to a simple task. The patient's Simple Attention score is 107, with a percentile of 68, and falls within the average range.    Motor Speed: Measure: Ability to perform simple movements to produce and satisfy an intention towards a manual action and goal. The patient's Motor Speed score is 94, with a percentile of 34, and falls within the average range.    Amber Adult ADHD Rating Scale-IV: Self and Other Reports (BAARS-IV)  The BAARS-IV assesses for symptoms of ADHD that are experienced in one's daily life. This assessment measure includes self and collateral rating scales designed to provide information regarding current and childhood symptoms of ADHD including inattention, hyperactivity, and impulsivity. Self-report scores are reported as percentiles. Scores at the 76th-83rd percentile are considered marginal, scores at the 84th-92nd percentile are considered borderline, scores at the 93rd-95th percentile are considered mild, scores at the 96th-98th percentile are considered moderate, and those at the 99th percentile are considered severe. Collateral or \"other\" rating scales are reported as number of symptoms observed in comparison to those reported by the client. Norms and percentile scores are not available for collateral reports.     Current Symptoms Scale--Self Report:   Client completed the self-report inventory of current symptoms. The results indicate that the client's Total ADHD Score was 40 which places the patient in the 96th percentile for overall ADHD symptoms. In addition, the client endorsed 4/9 (97th percentile) Inattention symptoms, 1/9 (86th percentile) Hyperactivity-Impulsivity symptoms, and 2/9 (86th percentile) Sluggish Cognitive Tempo symptoms. Client indicated that the reported symptoms have resulted in impaired functioning in school and at home.  Overall, the results suggest the client is experiencing mild ADHD symptoms.     Current " Symptoms Scale--Other Report:  Client's friend completed the collateral report inventory of current symptoms. Based on the collateral contact's observation of symptoms, the client demonstrates 3/9 Inattention symptoms, 2/5 Hyperactivity symptoms, 0/4 Impulsivity symptoms, and 3/9 Sluggish Cognitive Tempo symptoms. The client's Total ADHD Score was 38. The collateral contact indicated the client demonstrates impaired functioning in school and at home.  The collateral- and self-report scores are not significantly different.    Childhood Symptoms Scale--Self-Report:  Client completed the self-report inventory of childhood symptoms. The results indicate that the client's Total ADHD Score was 38 which places the patient in the 90th percentile for overall ADHD symptoms in childhood. In addition, the client endorsed 4/9 (93rd percentile) Inattention symptoms and 2/9 (88th percentile) Hyperactivity-Impulsivity symptoms. Client indicated that the reported symptoms resulted in impaired functioning in school and at home.  Overall, the results suggest the client experienced borderline symptoms of ADHD as a child.     Childhood Symptoms Scale--Other Report:  Client's mother completed the collateral report inventory of childhood symptoms. Based on the collateral contact's recollection of client's childhood symptoms, the client demonstrated 1/9 Inattention symptoms and 3/9 Hyperactivity-Impulsivity symptoms. The client's Total ADHD Score was 35. The collateral contact indicated the client demonstrates impaired functioning in school and at home.  The collateral- and self-report scores are not significantly different.                           Amber Functional Impairment Scale: Self and Other Reports (BFIS)  The BFIS is used to assess an individuals' psychosocial impairment in major life/daily activities that may be due to a mental health disorder. This assessment measure includes self and collateral rating scales. Self-report  "scores are reported as percentiles. Scores at the 76th-83rd percentile are considered marginal, scores at the 84th-92nd percentile are considered borderline, scores at the 93rd-95th percentile are considered mild, scores at the 96th-98th percentile are considered moderate, and those at the 99th percentile are considered severe. Collateral or \"other\" rating scales are reported as number of symptoms observed in comparison to those reported by the client. Norms and percentile scores are not available for collateral reports.     Results indicate the client identified impairment (scores at or greater than 93rd percentile) in the following areas: Work.  The client's Mean Impairment Score was 1.2 (1-50th percentile) indicating the client is reporting 10% impairment in functioning across domains. Client's friend completed the collateral rating scale, which indicated similar results. The collateral contact's scores were generally higher than the client's report.     Amber Deficits in Executive Functioning Scale (BDEFS)  The BDEFS is a measure used for evaluating dimensions of adult executive functioning in daily life. This assessment measure includes self and collateral rating scales. Self-report scores are reported as percentiles. Scores at the 76th-83rd percentile are considered marginal, scores at the 84th-92nd percentile are considered borderline, scores at the 93rd-95th percentile are considered mild, scores at the 96th-98th percentile are considered moderate, and those at the 99th percentile are considered severe. Collateral or \"other\" rating scales are reported as number of symptoms observed in comparison to those reported by the client. Norms and percentile scores are not available for collateral reports.     Results indicate the client's Total Executive Functioning Score was 153 (76th percentile). The ADHD-Executive Functioning Index score was 21 (86th percentile). These scores suggest the client has borderline " deficits in executive functioning. Results indicate the client identified significant deficits in the following areas: self-management to time (borderline deficits), self-organization/problem-solving (no significant deficits), self-restraint (no significant deficits), self-motivation (no significant deficits) and self-regulation of emotions (no significant deficits). Client's friend completed the collateral rating scale, which indicated similar results.     Generalized Anxiety Disorder Questionnaire (JESSIKA-7)  This questionnaire is designed to assess for anxiety in adults. Based on the score, the patient is experiencing mild symptoms of anxiety. Client identified the following symptoms of anxiety: feeling on edge/nervous/anxious, difficulty controlling worry, worrying about many different things, and becoming easily annoyed or irritable.    Patient Health Questionnaire- 9 (PHQ-9)   This questionnaire is designed to assess for depression in adults. Based on the score, the patient is experiencing no significant symptoms of depression. Client identified the following symptoms of depression: poor concentration.    SUMMARY: Marvin Sims is a 57-year-old White man who completed psychological testing remotely/virtually due to the COVID-19 pandemic. Testing was requested to provide updated diagnostic clarification and necessary treatment recommendations.    Patient first completed a diagnostic interview in which mental health symptoms, ADHD symptoms, and background information was gathered. Patient self-reported four symptoms of inattention and indicated that his abilities to function effectively at home and work are significantly impaired. Further, his self-reported symptoms on Amber measures of ADHD symptoms were consistent with this information.  The patient recalled some inattention symptoms from childhood, though his mother recalled more hyperactivity symptoms.  The patient stated that his mother is a reaction to  "completing her portion of the questionnaire was \"how am I supposed to remember that?\"    An objective measure of neurocognitive functioning was also administered.  Results first indicated the patient's verbal memory to be scored in the average range.  Indeed, he was able to recognize target words from a word list immediately and after a delay in the average range.  Visual memory was scored to be in the above average range, as the patient was able to recognize target shapes immediately and after a delay in the above average range.  Motor functioning appears to be intact, as motor speed, psychomotor speed, and reaction time were all scored to be in the average range.  Reasoning was scored to be in the above average range, indicating the patient was able to solve nonverbal puzzle matrices more effectively than peers.  Processing speed scored to be in the average range, as the patient was able to scan and respond to visual stimuli comparable to peers.  On the Stroop test, the patient was able to inhibit the salient, but incorrect, response in the above average range.  On a test of shifting attention, the patient was able to adjust his responses according to changing rule sets in the average range.  As such, complex attention is scored to be in the above average range while cognitive flexibility and executive functioning were scored to be in the average range.  On a continuous performance test, the patient was able to mitch his attention and resist making impulsive mistakes in the average range; simple attention scored to be in the average range.  On a more complex continuous performance test that included one-back and two-back components, the patient had difficulties holding information in mind for short-term manipulation.  As such, working memory was scored to be in the low average range.  ADHD is associated with significant discrepancies between higher-order (memory and reasoning) and second order (attention and " working memory) capabilities.  CNS vital signs results are consistent with the sort of presentation.  As such, current problems can be conceptualized as having a neurodevelopmental basis.  See recommendations below.    Referral Question Response: DSM-5 criteria for ADHD:   A. Symptom Count - Are there sufficient symptoms for the diagnosis?  Yes, patient did endorse sufficient inattention symptoms.   B. Onset - Were several symptoms present before 12 years of age?  Yes, a history of symptoms began by age 7.  C. Pervasiveness - Are several symptoms present in at least two settings? Yes, patient reported that symptoms are problematic at home and work.   D. Impairment - Do symptoms interfere with or reduce the quality of functioning? Yes, patient is unable to complete daily tasks effectively.   E. Exclusions - Are symptoms better explained by another disorder or factor?  No, symptoms are not better explained by anxiety and depression symptoms, which are currently well managed. Difficulties are explained by an organic basis of inattention.     The patient meets the following DSM-5 criteria for unspecified anxiety disorder:  A. Excessive anxiety and worry, occurring more days than not for at least 6 months about a number of events or activities.   B. The individual finds it difficult to control the worry.  C. The anxiety and worry are associated with 3 or more of 6 symptoms.  D. The anxiety, worry, or physical symptoms cause clinically significant distress or impairment in social, occupational, or other important areas of functioning.  E. The disturbance is not attributable to the physiological effects of a substance (e.g., a drug of abuse, a medication) or another medical condition (e.g., hyperthyroidism).  F. The disturbance is not better explained by another mental disorder.    DIAGNOSES:  F90.0 Attention-Deficit/Hyperactivity Disorder, inattentive presentation, mild  F41.9 Unspecified Anxiety Disorder  F33.42 Major  Depressive Disorder, recurrent episode, in full remission    PLAN OF CARE:  1. Discuss the following with your primary care provider:  a. Consider a trial of a stimulant medication. This may help alleviate some of the patient's attentional symptoms.     2. Consider initiating individual psychotherapy to help alleviate mood symptoms. Research indicates that outcomes are best with both medication and therapy. You can call the  [facility] line at [phone]. KAYLA Griffith or KAYLA Pierre are recommended specifically because they works with individuals with ADHD.    RECOMMENDATIONS:  1. Due to the patient's reported attention, concentration, and mood difficulties, the following health/lifestyle changes when combined, can significantly improve symptoms:   a. Avoid simple carbohydrates at breakfast. Aim for only complex carbohydrates and lean protein for your morning meal.   b. Engage in aerobic exercise 3 times per week for 30 minutes, ensuring that your heart rate stays within your training zone. Further, reading the book,  Spark,  by Andrea Almodovar M.D can help the patient understand the benefits of exercise on the brain.   c. Research suggest that taking a high-quality multi-vitamin and antioxidant (1/2 cup of blueberries) daily in conjunction with balanced nutrition can be helpful.  d. Aim for the high end of daily water intake: around 72 ounces per day.  e. Ensure regular meals and snacks to maintain optimal attention.    2. The following may be beneficial in managing some of the patient's attention and concentration difficulties:  a. Due to the patient's difficulties with attention and concentration, consider working in a completely distraction-free area while completing tasks. Workspaces should be completely clear except for the materials needed for the current task. Both visual and auditory distractions should be decreased as much as possible.  b. Considering decreased  "ability to focus and maintain attention, it is recommended that the patient take frequent breaks while completing tasks. This will help to maintain attention and effort. The patient may benefit from the use of a [employer] Timer. The timer works by using built-in break times. After working on a task consistently for 25 minutes, the timer reminds the user to take a five-minute break before continuing, etc. A [employer] timer can be downloaded as a free margarita to a phone or tablet.  c. Due to the patient's attentional and concentration symptoms, it is recommended to increase organization with the use of lists and calendars. Significantly increasing structure to the day and adhering to a set schedule can increase your ability to complete responsibilities, track deadline, etc. Breaking these tasks down into their component parts and recording them in a calendar/planner will likely be beneficial. Patient would benefit from setting feasible timelines for completion of activities. By establishing clear priorities for completing tasks, you can more likely complete the most important tasks first. The patient may also choose to elect to a friend or family member to help hold them accountable.    3. Avoid multitasking. Attempting to work on multiple tasks and projects the same increases the likelihood that an error will occur. Focus on one task at a time.    4. The patient may benefit from engaging in mindfulness practices. This may include breathing techniques, apps that provide guided meditation, or more interactive activities such as coloring.    5. Develop a \"coping skills jar/box.\" This entails designating a certain container to hold slips of paper with distraction technique ideas written on each slip of paper. Distraction techniques may include listening to a certain type of music, playing on game on your phone, doing a breathing exercise, spending time with a pet, calling a certain individual, looking at a magazine, working on " a puzzle, etc. When feeling distressed, choose a slip of paper from the container and engage in that activity rather than focusing on the problem.    6. See the attached tip sheet for more ideas as well as online and book resources.       Viridiana Rowlye PsyD, LP  Clinical Psychologist

## 2023-03-14 NOTE — PROGRESS NOTES
[facility]   Mental Health & Addiction Services     Progress Note - ADHD Feedback Session     Patient Name: Lasha Sims  Date: 2023       Service Type:  Individual       Session Start Time: 8:01am  Session End Time:  8:35am     Session Length: 34 minutes    Session #: 3    Attendees: Patient attended alone    Service Modality: Video Visit:      Provider verified identity through the following two step process.  Patient provided:  Patient  and Patient address    Telemedicine Visit: The patient's condition can be safely assessed and treated via synchronous audio and visual telemedicine encounter.      Reason for Telemedicine Visit: Services only offered telehealth    Originating Site (Patient Location): Patient's home    Distant Site (Provider Location): Provider Remote Setting- Home Office    Consent:  The patient/guardian has verbally consented to: the potential risks and benefits of telemedicine (video visit) versus in person care; bill my insurance or make self-payment for services provided; and responsibility for payment of non-covered services.     Patient would like the video invitation sent by:  Send to e-mail at: [email or web address]    Mode of Communication:  Video Conference via Amwell    Distant Location (Provider):  Off-site    As the provider I attest to compliance with applicable laws and regulations related to telemedicine.      PHQ 2023 2023 3/13/2023   PHQ-9 Total Score 7 2 6   Q9: Thoughts of better off dead/self-harm past 2 weeks Not at all Not at all Not at all       JESSIKA-7 SCORE 2022   Total Score 6 (mild anxiety) 8 (mild anxiety) -   Total Score 6 8 4         DATA      Progress Since Last Session (Related to Symptoms / Goals / Homework):   Symptoms: Stable.    Homework: Completed.      Treatment Objective(s) Addressed in This Session:   Provided feedback on ADHD evaluation. Reviewed test  results in depth. Plan of care and recommendations were discussed based on testing data. See full report attached on secondary note in this encounter.     Intervention:   Converted to phone session after about 5 minutes due to audio problems. Provided feedback to patient regarding testing results, diagnoses, and treatment recommendations. Test results are consistent with an ADHD diagnosis. Symptoms are not better explained by an anxiety disorder. Personalized suggestions regarding symptoms were offered. Patient had the opportunity to ask questions; he expressed understanding.        ASSESSMENT: Current Emotional / Mental Status (status of significant symptoms):   Risk status (Self / Other harm or suicidal ideation)   Patient denies current fears or concerns for personal safety.   Patient denies current or recent suicidal ideation or behaviors.   Patient denies current or recent homicidal ideation or behaviors.   Patient denies current or recent self injurious behavior or ideation.   Patient denies other safety concerns.   Patient reports there has been no change in risk factors since their last session.     Patient reports there has been no change in protective factors since their last session.     Recommended that patient call 911 or go to the local ED should there be a change in any of these risk factors.     Appearance:   Appropriate    Eye Contact:   Good    Psychomotor Behavior: Normal    Attitude:   Cooperative    Orientation:   All   Speech    Rate / Production: Normal     Volume:  Normal    Mood:    Normal   Affect:    Appropriate    Thought Content:  Clear    Thought Form:  Coherent  Logical    Insight:    Good      Medication Review:   No changes to current psychiatric medication(s)     Medication Compliance:   Yes     Changes in Health Issues:   None reported     Chemical Use Review:   Substance Use: Chemical use reviewed, no active concerns identified      Nicotine Use: No current tobacco use.       Diagnosis:  1. Attention deficit hyperactivity disorder, inattentive type, mild    2. Anxiety    3. Major depressive disorder, recurrent episode, in full remission (H)        PLAN:   Recommendations are outlined in full evaluation report (attached to this encounter).   Patient indicated understanding and will contact the clinic if there are further questions.    Report routed to referring provider.    Parts of this documentation may have been completed using dictation software. Potential errors may result and are unintentional.       Viridiana Rowely PsyD,   Clinical Psychologist         Psychological Testing Services Summary       Testing Evaluation Services Base: 13163  (first 60 mins) Add-on: 99246  (each addtl 60 mins)   Record Review and Clarify Referral Question   8:50am-9:00am on 2/1/2023 10 minutes   Clinical Decision Making/Battery Modification   1:45pm-2:00pm on 2/8/2023 15 minutes   Integration/Report Generation   1:00pm-2:00pm on 3/10/2023 ([facility])  2:00pm-2:30pm on 3/10/2023 ([facility] Vital Signs)  2:30pm-3:30pm on 3/10/2023 (Final Report)   60 minutes  30 minutes  60 minutes   Interactive Feedback Session   8:01am-8:35am on 3/14/2023 34 minutes   Post-Service Work   8:35am-8:50am on 3/14/2023 15 minutes   Total Time: 224 minutes   Total Units: 1 3       Diagnoses:   F90.0 Attention-Deficit/Hyperactivity Disorder, inattentive presentation, mild  F41.9 Unspecified Anxiety Disorder  F33.42 Major Depressive Disorder, recurrent episode, in full remission

## 2023-03-16 ENCOUNTER — VIRTUAL VISIT (OUTPATIENT)
Dept: FAMILY MEDICINE | Facility: CLINIC | Age: 58
End: 2023-03-16
Payer: COMMERCIAL

## 2023-03-16 DIAGNOSIS — F90.0 ATTENTION DEFICIT HYPERACTIVITY DISORDER (ADHD), PREDOMINANTLY INATTENTIVE TYPE: ICD-10-CM

## 2023-03-16 PROCEDURE — 99213 OFFICE O/P EST LOW 20 MIN: CPT | Mod: VID | Performed by: INTERNAL MEDICINE

## 2023-03-16 RX ORDER — DEXTROAMPHETAMINE SACCHARATE, AMPHETAMINE ASPARTATE MONOHYDRATE, DEXTROAMPHETAMINE SULFATE AND AMPHETAMINE SULFATE 2.5; 2.5; 2.5; 2.5 MG/1; MG/1; MG/1; MG/1
10 CAPSULE, EXTENDED RELEASE ORAL DAILY
Qty: 30 CAPSULE | Refills: 0 | Status: SHIPPED | OUTPATIENT
Start: 2023-03-16 | End: 2023-04-18

## 2023-03-16 NOTE — PROGRESS NOTES
Al is a 57 year old who is being evaluated via a billable video visit.      How would you like to obtain your AVS? MyChart  If the video visit is dropped, the invitation should be resent by: Send to e-mail at: [email or web address]  Will anyone else be joining your video visit? No        Assessment & Plan     1.  ADHD predominantly inattentive type.  Confirmed by testing.  We will start with Adderall XR 10 mg daily in the morning.  Follow-up in 4 to 6 weeks and see if dose adjustment is necessary.      Return in about 4 weeks (around 4/13/2023) for video visit.    Pal Wilks MD  [facility]   Al is a 57 year old, presenting for the following health issues:  A.D.H.D      History of Present Illness       Reason for visit:  ADHD    He eats 4 or more servings of fruits and vegetables daily.He consumes 0 sweetened beverage(s) daily.He exercises with enough effort to increase his heart rate 60 or more minutes per day.  He exercises with enough effort to increase his heart rate 6 days per week.   He is taking medications regularly.     Patient had ADHD the testing performed in it appears that he would benefit having treatment of it.  I have reviewed the assessment.  Patient called into discussed starting treatment.  Also was recommended behavioral health therapy and he is in the process of looking for a therapist right now.  In the past he has had therapy.        Review of Systems   Constitutional, HEENT, cardiovascular, pulmonary, gi and gu systems are negative, except as otherwise noted.      Objective           Vitals:  No vitals were obtained today due to virtual visit.    Physical Exam   GENERAL: Healthy, alert and no distress  EYES: Eyes grossly normal to inspection.  No discharge or erythema, or obvious scleral/conjunctival abnormalities.  RESP: No audible wheeze, cough, or visible cyanosis.  No visible retractions or increased work of breathing.    SKIN: Visible skin clear.  No significant rash, abnormal pigmentation or lesions.  NEURO: Cranial nerves grossly intact.  Mentation and speech appropriate for age.  PSYCH: Mentation appears normal, affect normal/bright, judgement and insight intact, normal speech and appearance well-groomed.                Video-Visit Details    Type of service:  Video Visit     Originating Location (pt. Location): Home    Distant Location (provider location):  On-site  Platform used for Video Visit: Michele

## 2023-03-17 ENCOUNTER — MYC MEDICAL ADVICE (OUTPATIENT)
Dept: FAMILY MEDICINE | Facility: CLINIC | Age: 58
End: 2023-03-17
Payer: COMMERCIAL

## 2023-04-18 ENCOUNTER — MYC REFILL (OUTPATIENT)
Dept: FAMILY MEDICINE | Facility: CLINIC | Age: 58
End: 2023-04-18
Payer: COMMERCIAL

## 2023-04-18 DIAGNOSIS — F90.0 ATTENTION DEFICIT HYPERACTIVITY DISORDER (ADHD), PREDOMINANTLY INATTENTIVE TYPE: ICD-10-CM

## 2023-04-18 RX ORDER — DEXTROAMPHETAMINE SACCHARATE, AMPHETAMINE ASPARTATE MONOHYDRATE, DEXTROAMPHETAMINE SULFATE AND AMPHETAMINE SULFATE 2.5; 2.5; 2.5; 2.5 MG/1; MG/1; MG/1; MG/1
10 CAPSULE, EXTENDED RELEASE ORAL DAILY
Qty: 30 CAPSULE | Refills: 0 | Status: SHIPPED | OUTPATIENT
Start: 2023-04-18 | End: 2023-04-20

## 2023-04-18 RX ORDER — DEXTROAMPHETAMINE SACCHARATE, AMPHETAMINE ASPARTATE MONOHYDRATE, DEXTROAMPHETAMINE SULFATE AND AMPHETAMINE SULFATE 2.5; 2.5; 2.5; 2.5 MG/1; MG/1; MG/1; MG/1
10 CAPSULE, EXTENDED RELEASE ORAL DAILY
Qty: 30 CAPSULE | Refills: 0 | OUTPATIENT
Start: 2023-04-18

## 2023-04-20 RX ORDER — DEXTROAMPHETAMINE SACCHARATE, AMPHETAMINE ASPARTATE MONOHYDRATE, DEXTROAMPHETAMINE SULFATE AND AMPHETAMINE SULFATE 2.5; 2.5; 2.5; 2.5 MG/1; MG/1; MG/1; MG/1
10 CAPSULE, EXTENDED RELEASE ORAL DAILY
Qty: 30 CAPSULE | Refills: 0 | Status: SHIPPED | OUTPATIENT
Start: 2023-04-20 | End: 2023-04-22

## 2023-04-20 NOTE — TELEPHONE ENCOUNTER
Patient calling to report that [facility] is out of stock of Adderrall XR 10 mg. Medication has been working well for patient.     RN advised for patient to find an alternative pharmacy to find where it is in stock and give us an update so that the Rx can be sent there. RN explained that it is a controlled substance and there are regulations on how this medication is filled. Patient verbalized understanding and will try finding a pharmacy that has it in stock.    Oralia FULLER RN  [facility] Primary Care Triage

## 2023-04-20 NOTE — TELEPHONE ENCOUNTER
Patient calling back and he states that he needs his medication,  amphetamine-dextroamphetamine (ADDERALL XR) 10 MG 24 hr capsule,  To be sent to [facility] in [place]    Jeanne Rodas [facility]   Primary Care

## 2023-04-21 DIAGNOSIS — F90.0 ATTENTION DEFICIT HYPERACTIVITY DISORDER (ADHD), PREDOMINANTLY INATTENTIVE TYPE: ICD-10-CM

## 2023-04-21 NOTE — TELEPHONE ENCOUNTER
Rcvd a call from pt pharmacy stating they are on back order and have no idea when this will come in. They are requesting a different medication all together as all the doses of adderall have been out of stock. Pt has been notified by pharmacy of this as well. Routing to provider to advise.  Kareen Colindres CMA

## 2023-04-21 NOTE — TELEPHONE ENCOUNTER
[facility] looks like pt is wanting this sent to a [facility] pharmacy  [facility] [place]

## 2023-04-21 NOTE — TELEPHONE ENCOUNTER
Reason for Call:  Medication or medication refill:    Do you use a [facility]?  Name of the pharmacy and phone number for the current request: [facility] [address] [place] [facility] [address]        Name of the medication requested: amphetamine-dextroamphetamine (ADDERALL XR) 10 MG 24 hr capsule    Other request: patients primary pharmacy - [facility] pharmacy does not have this medication. He is requesting that it be sent to pharmacy noted above.     Can we leave a detailed message on this number? YES    Phone number patient can be reached at: Home number on file [phone] (home)    Best Time: anytime    Call taken on 4/21/2023 at 12:29 PM by Ivett Dumont

## 2023-04-22 RX ORDER — DEXTROAMPHETAMINE SACCHARATE, AMPHETAMINE ASPARTATE MONOHYDRATE, DEXTROAMPHETAMINE SULFATE AND AMPHETAMINE SULFATE 2.5; 2.5; 2.5; 2.5 MG/1; MG/1; MG/1; MG/1
10 CAPSULE, EXTENDED RELEASE ORAL DAILY
Qty: 30 CAPSULE | Refills: 0 | Status: SHIPPED | OUTPATIENT
Start: 2023-04-22 | End: 2023-05-23

## 2023-04-24 ENCOUNTER — TELEPHONE (OUTPATIENT)
Dept: FAMILY MEDICINE | Facility: CLINIC | Age: 58
End: 2023-04-24
Payer: COMMERCIAL

## 2023-04-24 NOTE — TELEPHONE ENCOUNTER
I confirmed with pt that he needs prescription [facility] to [facility] cancelled so he can fill at [facility].  I called [facility] and cancelled prescription.  Arianna Arteaga BSN, RN

## 2023-04-24 NOTE — TELEPHONE ENCOUNTER
Reason for Call:  Medication or medication refill: Pt has prescription at [facility] that Pt wants to cancel. Pt wants to  Adderal at the [facility] sense they have that medication in stock.     Do you use a [facility]?  Name of the pharmacy and phone number for the current request:  [facility] [place], [place].     Name of the medication requested: amphetamine-dextroamphetamine (ADDERALL XR) 10 MG 24 hr capsule     Other request:     Can we leave a detailed message on this number? YES    Phone number patient can be reached at: Cell number on file:    Telephone Information:   Mobile [phone]       Call taken on 4/24/2023 at 11:40 AM by Kathe Tsai

## 2023-04-26 DIAGNOSIS — E78.00 HYPERCHOLESTEREMIA: ICD-10-CM

## 2023-04-26 RX ORDER — SIMVASTATIN 10 MG
10 TABLET ORAL AT BEDTIME
Qty: 30 TABLET | Refills: 0 | Status: SHIPPED | OUTPATIENT
Start: 2023-04-26 | End: 2023-06-09

## 2023-05-07 ENCOUNTER — HEALTH MAINTENANCE LETTER (OUTPATIENT)
Age: 58
End: 2023-05-07

## 2023-05-30 ENCOUNTER — VIRTUAL VISIT (OUTPATIENT)
Dept: FAMILY MEDICINE | Facility: CLINIC | Age: 58
End: 2023-05-30
Payer: COMMERCIAL

## 2023-05-30 DIAGNOSIS — F90.0 ATTENTION DEFICIT HYPERACTIVITY DISORDER (ADHD), PREDOMINANTLY INATTENTIVE TYPE: Primary | ICD-10-CM

## 2023-05-30 PROCEDURE — 99213 OFFICE O/P EST LOW 20 MIN: CPT | Mod: VID | Performed by: INTERNAL MEDICINE

## 2023-05-30 RX ORDER — DEXTROAMPHETAMINE SACCHARATE, AMPHETAMINE ASPARTATE MONOHYDRATE, DEXTROAMPHETAMINE SULFATE AND AMPHETAMINE SULFATE 7.5; 7.5; 7.5; 7.5 MG/1; MG/1; MG/1; MG/1
30 CAPSULE, EXTENDED RELEASE ORAL DAILY
Qty: 30 CAPSULE | Refills: 0 | Status: SHIPPED | OUTPATIENT
Start: 2023-06-30 | End: 2023-07-30

## 2023-05-30 RX ORDER — DEXTROAMPHETAMINE SACCHARATE, AMPHETAMINE ASPARTATE MONOHYDRATE, DEXTROAMPHETAMINE SULFATE AND AMPHETAMINE SULFATE 7.5; 7.5; 7.5; 7.5 MG/1; MG/1; MG/1; MG/1
30 CAPSULE, EXTENDED RELEASE ORAL DAILY
Qty: 30 CAPSULE | Refills: 0 | Status: SHIPPED | OUTPATIENT
Start: 2023-07-31 | End: 2023-08-30

## 2023-05-30 RX ORDER — DEXTROAMPHETAMINE SACCHARATE, AMPHETAMINE ASPARTATE MONOHYDRATE, DEXTROAMPHETAMINE SULFATE AND AMPHETAMINE SULFATE 7.5; 7.5; 7.5; 7.5 MG/1; MG/1; MG/1; MG/1
30 CAPSULE, EXTENDED RELEASE ORAL DAILY
Qty: 30 CAPSULE | Refills: 0 | Status: SHIPPED | OUTPATIENT
Start: 2023-05-30 | End: 2023-06-05

## 2023-05-30 ASSESSMENT — PATIENT HEALTH QUESTIONNAIRE - PHQ9
SUM OF ALL RESPONSES TO PHQ QUESTIONS 1-9: 4
SUM OF ALL RESPONSES TO PHQ QUESTIONS 1-9: 4
10. IF YOU CHECKED OFF ANY PROBLEMS, HOW DIFFICULT HAVE THESE PROBLEMS MADE IT FOR YOU TO DO YOUR WORK, TAKE CARE OF THINGS AT HOME, OR GET ALONG WITH OTHER PEOPLE: SOMEWHAT DIFFICULT

## 2023-05-30 NOTE — PROGRESS NOTES
Al is a 57 year old who is being evaluated via a billable video visit.      How would you like to obtain your AVS? MyChart  If the video visit is dropped, the invitation should be resent by: Text to cell phone: [phone]  Will anyone else be joining your video visit? No        Assessment & Plan     1.  ADHD predominantly inattentive type.  The Adderall XR 10 mg has had no impact.  After further discussion agreed to increase the dose of Adderall.  Have decided to go to Adderall XR 30 mg a day.  Follow-up in 6 weeks.      Pal Wilks MD  [facility]   Al is a 57 year old, presenting for the following health issues:  [place]        5/30/2023    11:24 AM   Additional Questions   Roomed by Bran CONRAD    History of Present Illness       Reason for visit:  Follow up for ADHD Medication    He eats 2-3 servings of fruits and vegetables daily.He consumes 0 sweetened beverage(s) daily.He exercises with enough effort to increase his heart rate 30 to 60 minutes per day.  He exercises with enough effort to increase his heart rate 6 days per week. He is missing 1 dose(s) of medications per week.  He is not taking prescribed medications regularly due to remembering to take.    Today's PHQ-9         PHQ-9 Total Score: 4    PHQ-9 Q9 Thoughts of better off dead/self-harm past 2 weeks :   Not at all    How difficult have these problems made it for you to do your work, take care of things at home, or get along with other people: Somewhat difficult       Patient had a virtual visit regarding ADHD treatment.  He has not noticed any benefit.  Adderall XR 10 mg a day.  Has not noticed any side effects either.        Review of Systems   Constitutional, HEENT, cardiovascular, pulmonary, GI, , musculoskeletal, neuro, skin, endocrine and psych systems are negative, except as otherwise noted.      Objective           Vitals:  No vitals were obtained today due to virtual visit.    Physical  Exam   GENERAL: Healthy, alert and no distress  EYES: Eyes grossly normal to inspection.  No discharge or erythema, or obvious scleral/conjunctival abnormalities.  RESP: No audible wheeze, cough, or visible cyanosis.  No visible retractions or increased work of breathing.    SKIN: Visible skin clear. No significant rash, abnormal pigmentation or lesions.  NEURO: Cranial nerves grossly intact.  Mentation and speech appropriate for age.  PSYCH: Mentation appears normal, affect normal/bright, judgement and insight intact, normal speech and appearance well-groomed.                Video-Visit Details    Type of service:  Video Visit     Originating Location (pt. Location): Home  Distant Location (provider location):  On-site  Platform used for Video Visit: [email or web address]

## 2023-06-02 DIAGNOSIS — F90.0 ATTENTION DEFICIT HYPERACTIVITY DISORDER (ADHD), PREDOMINANTLY INATTENTIVE TYPE: ICD-10-CM

## 2023-06-02 NOTE — TELEPHONE ENCOUNTER
Patient is wanting to send medication to different pharmacy    amphetamine-dextroamphetamine (ADDERALL XR) 30 MG 24 hr capsule    Current pharmacy: [facility] [id number] [place] [facility] [place]  [address] [address] [facility] [address]      [place] [facility] [place] [address] [address] [place] [address]

## 2023-06-05 RX ORDER — DEXTROAMPHETAMINE SACCHARATE, AMPHETAMINE ASPARTATE MONOHYDRATE, DEXTROAMPHETAMINE SULFATE AND AMPHETAMINE SULFATE 7.5; 7.5; 7.5; 7.5 MG/1; MG/1; MG/1; MG/1
30 CAPSULE, EXTENDED RELEASE ORAL DAILY
Qty: 30 CAPSULE | Refills: 0 | Status: SHIPPED | OUTPATIENT
Start: 2023-06-05 | End: 2023-07-05

## 2023-06-09 DIAGNOSIS — E78.00 HYPERCHOLESTEREMIA: ICD-10-CM

## 2023-06-09 RX ORDER — SIMVASTATIN 10 MG
TABLET ORAL
Qty: 90 TABLET | Refills: 0 | Status: SHIPPED | OUTPATIENT
Start: 2023-06-09 | End: 2024-07-16

## 2023-08-14 ENCOUNTER — MYC REFILL (OUTPATIENT)
Dept: FAMILY MEDICINE | Facility: CLINIC | Age: 58
End: 2023-08-14

## 2023-08-14 DIAGNOSIS — F90.0 ATTENTION DEFICIT HYPERACTIVITY DISORDER (ADHD), PREDOMINANTLY INATTENTIVE TYPE: ICD-10-CM

## 2023-08-15 RX ORDER — DEXTROAMPHETAMINE SACCHARATE, AMPHETAMINE ASPARTATE MONOHYDRATE, DEXTROAMPHETAMINE SULFATE AND AMPHETAMINE SULFATE 7.5; 7.5; 7.5; 7.5 MG/1; MG/1; MG/1; MG/1
30 CAPSULE, EXTENDED RELEASE ORAL DAILY
Qty: 30 CAPSULE | Refills: 0 | Status: SHIPPED | OUTPATIENT
Start: 2023-08-15 | End: 2023-09-13

## 2023-08-15 NOTE — TELEPHONE ENCOUNTER
Signed Prescriptions:                        Disp   Refills    amphetamine-dextroamphetamine (ADDERALL XR*30 cap*0        Sig: Take 1 capsule (30 mg) by mouth daily  Authorizing Provider: CHERELLE ELI

## 2023-08-16 ENCOUNTER — MYC REFILL (OUTPATIENT)
Dept: FAMILY MEDICINE | Facility: CLINIC | Age: 58
End: 2023-08-16
Payer: COMMERCIAL

## 2023-08-16 DIAGNOSIS — F90.0 ATTENTION DEFICIT HYPERACTIVITY DISORDER (ADHD), PREDOMINANTLY INATTENTIVE TYPE: ICD-10-CM

## 2023-08-16 RX ORDER — DEXTROAMPHETAMINE SACCHARATE, AMPHETAMINE ASPARTATE MONOHYDRATE, DEXTROAMPHETAMINE SULFATE AND AMPHETAMINE SULFATE 7.5; 7.5; 7.5; 7.5 MG/1; MG/1; MG/1; MG/1
30 CAPSULE, EXTENDED RELEASE ORAL DAILY
Qty: 30 CAPSULE | Refills: 0 | OUTPATIENT
Start: 2023-08-16

## 2023-08-16 NOTE — TELEPHONE ENCOUNTER
Patient calling to inquire more about denied refill for Adderall XR 30 mg. RN relayed this was a duplicated Rx and duplicate was denied. Rx was already sent yesterday to [facility] pharmacy. Patient verbalized good understanding. No further questions or concerns.    JESSIKA Barton  [facility] Primary Care Triage

## 2023-08-30 ENCOUNTER — TELEPHONE (OUTPATIENT)
Dept: FAMILY MEDICINE | Facility: CLINIC | Age: 58
End: 2023-08-30
Payer: COMMERCIAL

## 2023-08-30 DIAGNOSIS — F90.0 ATTENTION DEFICIT HYPERACTIVITY DISORDER (ADHD), PREDOMINANTLY INATTENTIVE TYPE: ICD-10-CM

## 2023-08-30 RX ORDER — DEXTROAMPHETAMINE SACCHARATE, AMPHETAMINE ASPARTATE MONOHYDRATE, DEXTROAMPHETAMINE SULFATE AND AMPHETAMINE SULFATE 7.5; 7.5; 7.5; 7.5 MG/1; MG/1; MG/1; MG/1
30 CAPSULE, EXTENDED RELEASE ORAL DAILY
Qty: 30 CAPSULE | Refills: 0 | Status: SHIPPED | OUTPATIENT
Start: 2023-08-30 | End: 2023-10-02

## 2023-08-30 NOTE — TELEPHONE ENCOUNTER
Medication Question or Refill  -[facility] has not had Adderall in stock. PT did not receive RX dated 8/15. Needs new RX sent to different pharmacies      What medication are you calling about (include dose and sig)?: amphetamine-dextroamphetamine (ADDERALL XR) 30 MG 24 hr capsule     Preferred Pharmacy:  [facility] [address] [facility] [address]  [address]  [facility] [address]  Phone: [phone] Fax: [phone]      Controlled Substance Agreement on file:   CSA -- Patient Level:    CSA: None found at the patient level.       Who prescribed the medication?: Dt Efe    Do you need a refill? Yes      Could we send this information to you in [facility] or would you prefer to receive a phone call?:   Patient would prefer a phone call   Okay to leave a detailed message?: Yes at Cell number on file:    Telephone Information:   Mobile [phone]

## 2023-09-13 ENCOUNTER — OFFICE VISIT (OUTPATIENT)
Dept: FAMILY MEDICINE | Facility: CLINIC | Age: 58
End: 2023-09-13
Attending: INTERNAL MEDICINE
Payer: COMMERCIAL

## 2023-09-13 VITALS
BODY MASS INDEX: 25.28 KG/M2 | RESPIRATION RATE: 14 BRPM | HEART RATE: 66 BPM | OXYGEN SATURATION: 99 % | WEIGHT: 166.8 LBS | SYSTOLIC BLOOD PRESSURE: 110 MMHG | TEMPERATURE: 97.6 F | DIASTOLIC BLOOD PRESSURE: 68 MMHG | HEIGHT: 68 IN

## 2023-09-13 DIAGNOSIS — Z12.5 SCREENING FOR PROSTATE CANCER: ICD-10-CM

## 2023-09-13 DIAGNOSIS — E78.00 HYPERCHOLESTEREMIA: ICD-10-CM

## 2023-09-13 DIAGNOSIS — F41.1 GENERALIZED ANXIETY DISORDER: ICD-10-CM

## 2023-09-13 DIAGNOSIS — L98.9 BENIGN SKIN LESION: ICD-10-CM

## 2023-09-13 DIAGNOSIS — F90.0 ATTENTION DEFICIT HYPERACTIVITY DISORDER (ADHD), PREDOMINANTLY INATTENTIVE TYPE: ICD-10-CM

## 2023-09-13 DIAGNOSIS — Z00.00 ROUTINE GENERAL MEDICAL EXAMINATION AT A HEALTH CARE FACILITY: Primary | ICD-10-CM

## 2023-09-13 DIAGNOSIS — F32.9 MAJOR DEPRESSION, CHRONIC: ICD-10-CM

## 2023-09-13 DIAGNOSIS — R73.01 IFG (IMPAIRED FASTING GLUCOSE): ICD-10-CM

## 2023-09-13 DIAGNOSIS — H93.13 TINNITUS, BILATERAL: ICD-10-CM

## 2023-09-13 DIAGNOSIS — Z23 NEED FOR ZOSTER VACCINATION: ICD-10-CM

## 2023-09-13 DIAGNOSIS — N52.1 ERECTILE DYSFUNCTION DUE TO DISEASES CLASSIFIED ELSEWHERE: ICD-10-CM

## 2023-09-13 DIAGNOSIS — D70.8 OTHER NEUTROPENIA (H): ICD-10-CM

## 2023-09-13 LAB
ALBUMIN SERPL BCG-MCNC: 4.6 G/DL (ref 3.5–5.2)
ALP SERPL-CCNC: 66 U/L (ref 40–129)
ALT SERPL W P-5'-P-CCNC: 21 U/L (ref 0–70)
ANION GAP SERPL CALCULATED.3IONS-SCNC: 10 MMOL/L (ref 7–15)
AST SERPL W P-5'-P-CCNC: 12 U/L (ref 0–45)
BASOPHILS # BLD AUTO: 0 10E3/UL (ref 0–0.2)
BASOPHILS NFR BLD AUTO: 1 %
BILIRUB SERPL-MCNC: 0.7 MG/DL
BUN SERPL-MCNC: 13.8 MG/DL (ref 6–20)
CALCIUM SERPL-MCNC: 9.8 MG/DL (ref 8.6–10)
CHLORIDE SERPL-SCNC: 102 MMOL/L (ref 98–107)
CHOLEST SERPL-MCNC: 227 MG/DL
CREAT SERPL-MCNC: 0.94 MG/DL (ref 0.67–1.17)
DEPRECATED HCO3 PLAS-SCNC: 29 MMOL/L (ref 22–29)
EGFRCR SERPLBLD CKD-EPI 2021: >90 ML/MIN/1.73M2
EOSINOPHIL # BLD AUTO: 0.1 10E3/UL (ref 0–0.7)
EOSINOPHIL NFR BLD AUTO: 2 %
ERYTHROCYTE [DISTWIDTH] IN BLOOD BY AUTOMATED COUNT: 12.6 % (ref 10–15)
GLUCOSE SERPL-MCNC: 115 MG/DL (ref 70–99)
HCT VFR BLD AUTO: 43.2 % (ref 40–53)
HDLC SERPL-MCNC: 53 MG/DL
HGB BLD-MCNC: 14.3 G/DL (ref 13.3–17.7)
IMM GRANULOCYTES # BLD: 0 10E3/UL
IMM GRANULOCYTES NFR BLD: 1 %
LDLC SERPL CALC-MCNC: 149 MG/DL
LYMPHOCYTES # BLD AUTO: 1.4 10E3/UL (ref 0.8–5.3)
LYMPHOCYTES NFR BLD AUTO: 32 %
MCH RBC QN AUTO: 29.5 PG (ref 26.5–33)
MCHC RBC AUTO-ENTMCNC: 33.1 G/DL (ref 31.5–36.5)
MCV RBC AUTO: 89 FL (ref 78–100)
MONOCYTES # BLD AUTO: 0.3 10E3/UL (ref 0–1.3)
MONOCYTES NFR BLD AUTO: 7 %
NEUTROPHILS # BLD AUTO: 2.5 10E3/UL (ref 1.6–8.3)
NEUTROPHILS NFR BLD AUTO: 57 %
NONHDLC SERPL-MCNC: 174 MG/DL
PLATELET # BLD AUTO: 257 10E3/UL (ref 150–450)
POTASSIUM SERPL-SCNC: 4.6 MMOL/L (ref 3.4–5.3)
PROT SERPL-MCNC: 6.9 G/DL (ref 6.4–8.3)
PSA SERPL DL<=0.01 NG/ML-MCNC: 1.26 NG/ML (ref 0–3.5)
RBC # BLD AUTO: 4.84 10E6/UL (ref 4.4–5.9)
SODIUM SERPL-SCNC: 141 MMOL/L (ref 136–145)
TRIGL SERPL-MCNC: 125 MG/DL
WBC # BLD AUTO: 4.4 10E3/UL (ref 4–11)

## 2023-09-13 PROCEDURE — 80061 LIPID PANEL: CPT | Performed by: INTERNAL MEDICINE

## 2023-09-13 PROCEDURE — 99396 PREV VISIT EST AGE 40-64: CPT | Mod: 25 | Performed by: INTERNAL MEDICINE

## 2023-09-13 PROCEDURE — 36415 COLL VENOUS BLD VENIPUNCTURE: CPT | Performed by: INTERNAL MEDICINE

## 2023-09-13 PROCEDURE — 90750 HZV VACC RECOMBINANT IM: CPT | Performed by: INTERNAL MEDICINE

## 2023-09-13 PROCEDURE — G0103 PSA SCREENING: HCPCS | Performed by: INTERNAL MEDICINE

## 2023-09-13 PROCEDURE — 80053 COMPREHEN METABOLIC PANEL: CPT | Performed by: INTERNAL MEDICINE

## 2023-09-13 PROCEDURE — 85025 COMPLETE CBC W/AUTO DIFF WBC: CPT | Performed by: INTERNAL MEDICINE

## 2023-09-13 PROCEDURE — 99214 OFFICE O/P EST MOD 30 MIN: CPT | Mod: 25 | Performed by: INTERNAL MEDICINE

## 2023-09-13 PROCEDURE — 90471 IMMUNIZATION ADMIN: CPT | Performed by: INTERNAL MEDICINE

## 2023-09-13 RX ORDER — DEXTROAMPHETAMINE SACCHARATE, AMPHETAMINE ASPARTATE, DEXTROAMPHETAMINE SULFATE AND AMPHETAMINE SULFATE 2.5; 2.5; 2.5; 2.5 MG/1; MG/1; MG/1; MG/1
TABLET ORAL
Qty: 30 TABLET | Refills: 0 | Status: SHIPPED | OUTPATIENT
Start: 2023-09-13 | End: 2024-07-16

## 2023-09-13 ASSESSMENT — ENCOUNTER SYMPTOMS
NERVOUS/ANXIOUS: 0
JOINT SWELLING: 0
DIZZINESS: 0
SHORTNESS OF BREATH: 0
HEADACHES: 0
NAUSEA: 0
WEAKNESS: 0
FEVER: 0
CONSTIPATION: 0
COUGH: 0
ARTHRALGIAS: 0
PARESTHESIAS: 0
CHILLS: 0
DIARRHEA: 0
MYALGIAS: 0
EYE PAIN: 0
ABDOMINAL PAIN: 0
HEARTBURN: 0
PALPITATIONS: 0
HEMATOCHEZIA: 0
SORE THROAT: 0
DYSURIA: 0
FREQUENCY: 0
HEMATURIA: 0

## 2023-09-13 ASSESSMENT — PAIN SCALES - GENERAL: PAINLEVEL: NO PAIN (0)

## 2023-09-13 NOTE — PATIENT INSTRUCTIONS
Preventive Health Recommendations  Male Ages 50 - 64    Yearly exam:             See your health care provider every year in order to  o   Review health changes.   o   Discuss preventive care.    o   Review your medicines if your doctor has prescribed any.   Have a cholesterol test every 5 years, or more frequently if you are at risk for high cholesterol/heart disease.   Have a diabetes test (fasting glucose) every three years. If you are at risk for diabetes, you should have this test more often.   Have a colonoscopy at age 50, or have a yearly FIT test (stool test). These exams will check for colon cancer.    Talk with your health care provider about whether or not a prostate cancer screening test (PSA) is right for you.  You should be tested each year for STDs (sexually transmitted diseases), if you re at risk.     Shots: Get a flu shot each year. Get a tetanus shot every 10 years.     Nutrition:  Eat at least 5 servings of fruits and vegetables daily.   Eat whole-grain bread, whole-wheat pasta and brown rice instead of white grains and rice.   Get adequate Calcium and Vitamin D.     Lifestyle  Exercise for at least 150 minutes a week (30 minutes a day, 5 days a week). This will help you control your weight and prevent disease.   Limit alcohol to one drink per day.   No smoking.   Wear sunscreen to prevent skin cancer.   See your dentist every six months for an exam and cleaning.   See your eye doctor every 1 to 2 years.     How Severe Are Your Spot(S)?: mild Hpi Title: Evaluation of a Skin Lesion

## 2023-09-13 NOTE — PROGRESS NOTES
SUBJECTIVE:   CC: Al is an 58 year old who presents for preventative health visit.     58-year-old gentleman comes in for annual physical examination.  He feels his depression is well controlled with fluoxetine.  He has been diagnosed with ADHD and currently seems to be doing well overall well with Adderall 30 mg XR in the morning though when he puts in a long day he starts noticing that his concentration and effectiveness at work decreases in the afternoons and evenings.  He is wondering if he could take additional dose in the afternoon.  He skips Adderall on the weekends.  He exercises regularly.  He has noticed a small lump middle of the scalp that he wanted me to check.  He has chronic tinnitus of many years.  Seems to be little worse than the past 3 years.  He was wondering if there is any new treatment for tinnitus.  Also he has had heart beat that he would feel at times particularly if he is dehydrated.  He had a work-up of in the past including echocardiogram EKG and a Zio patch.  Exam looks negative Zio patch showing sinus rhythm with some rare PACs and PVCs.  A full run of as VT was noted.        5/30/2023    11:24 AM   Additional Questions   Roomed by Bran       Ear Problem          Social History     Tobacco Use    Smoking status: Never    Smokeless tobacco: Never    Tobacco comments:     na   Substance Use Topics    Alcohol use: Yes     Comment: infrequent             9/13/2023     5:50 AM   Alcohol Use   Prescreen: >3 drinks/day or >7 drinks/week? No       Last PSA:   PSA   Date Value Ref Range Status   10/20/2020 1.04 0 - 4 ug/L Final     Comment:     Assay Method:  Chemiluminescence using Siemens Vista analyzer     PSA Tumor Marker   Date Value Ref Range Status   02/10/2022 1.44 0.00 - 4.00 ug/L Final       Reviewed orders with patient. Reviewed health maintenance and updated orders accordingly - Yes  BP Readings from Last 3 Encounters:   09/13/23 110/68   02/10/22 128/68   08/04/21 116/74    Wt  Readings from Last 3 Encounters:   09/13/23 75.7 kg (166 lb 12.8 oz)   02/10/22 76.7 kg (169 lb 1.6 oz)   08/04/21 74.8 kg (165 lb)                  Patient Active Problem List   Diagnosis    Major depression, chronic    Generalized anxiety disorder    Hypercholesteremia    Other neutropenia (H)    IFG (impaired fasting glucose)    Inappropriate sinus tachycardia    S/P total knee arthroplasty, left    Erectile dysfunction due to diseases classified elsewhere    Need for Tdap vaccination    Need for zoster vaccination    Attention deficit hyperactivity disorder (ADHD), predominantly inattentive type     Past Surgical History:   Procedure Laterality Date    APPENDECTOMY OPEN N/A     ARTHROPLASTY KNEE Left 03/12/2021    Procedure: ARTHROPLASTY, KNEE, TOTAL LEFT;  Surgeon: Ton Jackson MD;  Location: [facility]    ARTHROSCOPY KNEE Left 03/12/2021    Procedure: ARTHROSCOPY, KNEE LEFT;  Surgeon: Ton Jackson MD;  Location: [facility] OR    AS TOTAL KNEE ARTHROPLASTY Left 03/12/2021    COLONOSCOPY  2017    HERNIA REPAIR Left     KNEE SURGERY Left 2003    ACL repair and partioal lateral manisectomy    LUNG SURGERY      ORTHOPEDIC SURGERY  2003    ACL repair left    LA HAND/FINGER SURGERY UNLISTED      THORACOSCOPY Right 10/19/2010    for pneumothorax. upper lobe wedge resection       Social History     Tobacco Use    Smoking status: Never    Smokeless tobacco: Never    Tobacco comments:     na   Substance Use Topics    Alcohol use: Yes     Comment: infrequent     Family History   Problem Relation Age of Onset    Hyperlipidemia Mother     Multiple myeloma Father 60    Other Cancer Father         multiple myloma    Prostate Cancer Brother 50    Brain Cancer Paternal Grandfather 60    Depression Daughter     Mental Illness Sister     Mental Illness Sister          Current Outpatient Medications   Medication Sig Dispense Refill    amphetamine-dextroamphetamine (ADDERALL XR) 30 MG 24 hr capsule Take 1 capsule (30 mg) by mouth  daily 30 capsule 0    FLUoxetine (PROZAC) 20 MG capsule TAKE 1 CAPSULE DAILY (LAST REFILL, NEED APPOINTMENT WITH DR STEWART) 90 capsule 3    simvastatin (ZOCOR) 10 MG tablet TAKE 1 TABLET(10 MG) BY MOUTH AT BEDTIME. FOLLOW UP WITH PCP FOR FUTURE REFILLS 90 tablet 0    tadalafil (CIALIS) 10 MG tablet Take 1 tablet (10 mg) by mouth daily as needed (for elerectile dysfunction) 30 tablet 3     No Known Allergies    Reviewed and updated as needed this visit by clinical staff   Tobacco  Allergies  Meds              Reviewed and updated as needed this visit by Provider                 Past Medical History:   Diagnosis Date    Attention deficit hyperactivity disorder (ADHD), predominantly inattentive type 3/16/2023    Depressive disorder 2001    Erectile dysfunction due to diseases classified elsewhere 2/10/2022    Generalized anxiety disorder 1990    Inappropriate sinus tachycardia 3/4/2021    Major depression, chronic     Mild hyperlipidemia     Osteoarthritis of left knee     Other neutropenia (H) 2/6/2020    S/P total knee arthroplasty, left 3/12/2021    Spontaneous pneumothorax 10/2010    Right side      Past Surgical History:   Procedure Laterality Date    APPENDECTOMY OPEN N/A     ARTHROPLASTY KNEE Left 03/12/2021    Procedure: ARTHROPLASTY, KNEE, TOTAL LEFT;  Surgeon: Ton Jackson MD;  [facility]: [facility]    ARTHROSCOPY KNEE Left 03/12/2021    Procedure: ARTHROSCOPY, KNEE LEFT;  Surgeon: Ton Jackson MD;  Location: [facility]    AS TOTAL KNEE ARTHROPLASTY Left 03/12/2021    COLONOSCOPY  2017    HERNIA REPAIR Left     KNEE SURGERY Left 2003    ACL repair and partioal lateral manisectomy    LUNG SURGERY      ORTHOPEDIC SURGERY  2003    ACL repair left    MS HAND/FINGER SURGERY UNLISTED      THORACOSCOPY Right 10/19/2010    for pneumothorax. upper lobe wedge resection       Review of Systems   HENT:  Positive for ear pain.      CONSTITUTIONAL: NEGATIVE for fever, chills, change in weight  INTEGUMENTARY/SKIN:  "NEGATIVE for worrisome rashes, moles or lesions  EYES: NEGATIVE for vision changes or irritation  ENT: NEGATIVE for ear, mouth and throat problems  RESP: NEGATIVE for significant cough or SOB  CV: NEGATIVE for chest pain, palpitations or peripheral edema  GI: NEGATIVE for nausea, abdominal pain, heartburn, or change in bowel habits   male: negative for dysuria, hematuria, decreased urinary stream, erectile dysfunction, urethral discharge  MUSCULOSKELETAL: NEGATIVE for significant arthralgias or myalgia  NEURO: NEGATIVE for weakness, dizziness or paresthesias  ENDOCRINE: NEGATIVE for temperature intolerance, skin/hair changes  HEME/ALLERGY/IMMUNE: NEGATIVE for bleeding problems  PSYCHIATRIC: NEGATIVE for changes in mood or affect    OBJECTIVE:   /68 (BP Location: Right arm, Patient Position: Sitting, Cuff Size: Adult Large)   Pulse 66   Temp 97.6  F (36.4  C) (Oral)   Resp 14   Ht 1.734 m (5' 8.25\")   Wt 75.7 kg (166 lb 12.8 oz)   SpO2 99%   BMI 25.18 kg/m      Physical Exam  GENERAL: healthy, alert and no distress  EYES: Eyes grossly normal to inspection, PERRL and conjunctivae and sclerae normal  HENT: ear canals and TM's normal, nose and mouth without ulcers or lesions  NECK: no adenopathy, no asymmetry, masses, or scars and thyroid normal to palpation  RESP: lungs clear to auscultation - no rales, rhonchi or wheezes  CV: regular rate and rhythm, normal S1 S2, no S3 or S4, no murmur, click or rub, no peripheral edema and peripheral pulses strong  ABDOMEN: soft, nontender, no hepatosplenomegaly, no masses and bowel sounds normal   (male): normal male genitalia without lesions or urethral discharge, no hernia  RECTAL: normal sphincter tone, no rectal masses, prostate normal size, smooth, nontender without nodules or masses  MS: no gross musculoskeletal defects noted, no edema  SKIN: no suspicious lesions or rashes  NEURO: Normal strength and tone, mentation intact and speech normal  PSYCH: mentation " appears normal, affect normal/bright        ASSESSMENT/PLAN:       1.  Routine general medical exam completed and is good.  2.  Hypercholesterolemia continue simvastatin 10 mg a day.  We will be checking lipids today.  3.  Neutropenia.  Has had mild chronic.  Longstanding rechecking CBC with differential and CMP.  4.  Major depression chronic well-controlled with fluoxetine 20 mg a day which she will continue.  5.  Generalized anxiety disorder currently well controlled.  Continue with fluoxetine.  6.  ADHD.  Continue with Adderall XL 30 mg in the morning and I will add 8 immediate release Adderall 10 mg to be taken in the afternoon and see if that helps.  7.  Erectile dysfunction which she uses as needed tadalafil.  8.  Impaired fasting glucose.  Fasting glucose will be checked today.  9.  Need for for Shingrix second dose vaccine provided today.  10.  Screening for prostate cancer by checking PSA.  11.  Benign work like lesion on the scalp measuring about 3 mm.  Reassured patient it looks benign and can follow-up again next year.  Other option was to have it removed or treated with liquid nitrogen.  12.  Bilateral tinnitus.  Symptoms are mild.  Patient informed that there are no new treatments for it.      I will get back to the lab results.  Otherwise return for follow-up in 1 year.    Patient has been advised of split billing requirements and indicates understanding: Yes      COUNSELING:   Reviewed preventive health counseling, as reflected in patient instructions       Regular exercise       Healthy diet/nutrition       Vision screening        He reports that he has never smoked. He has never used smokeless tobacco.            Pal Wilks MD  [facility]

## 2023-09-13 NOTE — NURSING NOTE
Prior to immunization administration, verified patients identity using patient s name and date of birth. Please see Immunization Activity for additional information. Yes    Screening Questionnaire for Adult Immunization    Are you sick today?   No   Do you have allergies to medications, food, a vaccine component or latex?   No   Have you ever had a serious reaction after receiving a vaccination?   No   Do you have a long-term health problem with heart, lung, kidney, or metabolic disease (e.g., diabetes), asthma, a blood disorder, no spleen, complement component deficiency, a cochlear implant, or a spinal fluid leak?  Are you on long-term aspirin therapy?   No   Do you have cancer, leukemia, HIV/AIDS, or any other immune system problem?   No   Do you have a parent, brother, or sister with an immune system problem?   No   In the past 3 months, have you taken medications that affect  your immune system, such as prednisone, other steroids, or anticancer drugs; drugs for the treatment of rheumatoid arthritis, Crohn s disease, or psoriasis; or have you had radiation treatments?   No   Have you had a seizure, or a brain or other nervous system problem?   No   During the past year, have you received a transfusion of blood or blood    products, or been given immune (gamma) globulin or antiviral drug?   No   For women: Are you pregnant or is there a chance you could become       pregnant during the next month?   N/A   Have you received any vaccinations in the past 4 weeks?   No     Immunization questionnaire answers were all negative.      Patient instructed to remain in clinic for 15 minutes afterwards, and to report any adverse reactions.     Screening performed by Diane L. Schoenherr, RN on 9/13/2023 at 8:23 AM.

## 2023-09-15 DIAGNOSIS — E78.00 HYPERCHOLESTEREMIA: Primary | ICD-10-CM

## 2023-09-15 RX ORDER — SIMVASTATIN 10 MG
TABLET ORAL
Qty: 90 TABLET | Refills: 0 | OUTPATIENT
Start: 2023-09-15

## 2023-09-15 RX ORDER — SIMVASTATIN 20 MG
20 TABLET ORAL AT BEDTIME
Qty: 90 TABLET | Refills: 3 | Status: SHIPPED | OUTPATIENT
Start: 2023-09-15 | End: 2024-07-16

## 2023-10-02 ENCOUNTER — MYC REFILL (OUTPATIENT)
Dept: FAMILY MEDICINE | Facility: CLINIC | Age: 58
End: 2023-10-02
Payer: COMMERCIAL

## 2023-10-02 DIAGNOSIS — F90.0 ATTENTION DEFICIT HYPERACTIVITY DISORDER (ADHD), PREDOMINANTLY INATTENTIVE TYPE: ICD-10-CM

## 2023-10-02 RX ORDER — DEXTROAMPHETAMINE SACCHARATE, AMPHETAMINE ASPARTATE MONOHYDRATE, DEXTROAMPHETAMINE SULFATE AND AMPHETAMINE SULFATE 7.5; 7.5; 7.5; 7.5 MG/1; MG/1; MG/1; MG/1
30 CAPSULE, EXTENDED RELEASE ORAL DAILY
Qty: 30 CAPSULE | Refills: 0 | Status: SHIPPED | OUTPATIENT
Start: 2023-10-02 | End: 2023-11-03

## 2023-11-03 ENCOUNTER — MYC REFILL (OUTPATIENT)
Dept: FAMILY MEDICINE | Facility: CLINIC | Age: 58
End: 2023-11-03
Payer: COMMERCIAL

## 2023-11-03 DIAGNOSIS — F90.0 ATTENTION DEFICIT HYPERACTIVITY DISORDER (ADHD), PREDOMINANTLY INATTENTIVE TYPE: ICD-10-CM

## 2023-11-03 RX ORDER — DEXTROAMPHETAMINE SACCHARATE, AMPHETAMINE ASPARTATE MONOHYDRATE, DEXTROAMPHETAMINE SULFATE AND AMPHETAMINE SULFATE 7.5; 7.5; 7.5; 7.5 MG/1; MG/1; MG/1; MG/1
30 CAPSULE, EXTENDED RELEASE ORAL DAILY
Qty: 30 CAPSULE | Refills: 0 | Status: SHIPPED | OUTPATIENT
Start: 2023-11-03 | End: 2023-12-04

## 2023-11-14 ENCOUNTER — MYC REFILL (OUTPATIENT)
Dept: FAMILY MEDICINE | Facility: CLINIC | Age: 58
End: 2023-11-14
Payer: COMMERCIAL

## 2023-11-14 DIAGNOSIS — F32.9 MAJOR DEPRESSION, CHRONIC: ICD-10-CM

## 2023-12-04 ENCOUNTER — MYC REFILL (OUTPATIENT)
Dept: FAMILY MEDICINE | Facility: CLINIC | Age: 58
End: 2023-12-04
Payer: COMMERCIAL

## 2023-12-04 DIAGNOSIS — F90.0 ATTENTION DEFICIT HYPERACTIVITY DISORDER (ADHD), PREDOMINANTLY INATTENTIVE TYPE: ICD-10-CM

## 2023-12-04 RX ORDER — DEXTROAMPHETAMINE SACCHARATE, AMPHETAMINE ASPARTATE MONOHYDRATE, DEXTROAMPHETAMINE SULFATE AND AMPHETAMINE SULFATE 7.5; 7.5; 7.5; 7.5 MG/1; MG/1; MG/1; MG/1
30 CAPSULE, EXTENDED RELEASE ORAL DAILY
Qty: 30 CAPSULE | Refills: 0 | Status: SHIPPED | OUTPATIENT
Start: 2023-12-04 | End: 2024-02-05

## 2024-03-11 ENCOUNTER — MYC REFILL (OUTPATIENT)
Dept: FAMILY MEDICINE | Facility: CLINIC | Age: 59
End: 2024-03-11
Payer: COMMERCIAL

## 2024-03-11 DIAGNOSIS — F90.0 ATTENTION DEFICIT HYPERACTIVITY DISORDER (ADHD), PREDOMINANTLY INATTENTIVE TYPE: ICD-10-CM

## 2024-03-12 RX ORDER — DEXTROAMPHETAMINE SACCHARATE, AMPHETAMINE ASPARTATE MONOHYDRATE, DEXTROAMPHETAMINE SULFATE AND AMPHETAMINE SULFATE 7.5; 7.5; 7.5; 7.5 MG/1; MG/1; MG/1; MG/1
30 CAPSULE, EXTENDED RELEASE ORAL DAILY
Qty: 30 CAPSULE | Refills: 0 | Status: SHIPPED | OUTPATIENT
Start: 2024-03-12 | End: 2024-07-16

## 2024-03-13 NOTE — PROGRESS NOTES
Please inform patient he needs to setup visit with me in April before I can provide further refills of Adderall. It can be virtual visit.

## 2024-05-17 ENCOUNTER — MYC REFILL (OUTPATIENT)
Dept: FAMILY MEDICINE | Facility: CLINIC | Age: 59
End: 2024-05-17
Payer: COMMERCIAL

## 2024-05-17 DIAGNOSIS — F90.0 ATTENTION DEFICIT HYPERACTIVITY DISORDER (ADHD), PREDOMINANTLY INATTENTIVE TYPE: ICD-10-CM

## 2024-05-19 RX ORDER — DEXTROAMPHETAMINE SACCHARATE, AMPHETAMINE ASPARTATE MONOHYDRATE, DEXTROAMPHETAMINE SULFATE AND AMPHETAMINE SULFATE 7.5; 7.5; 7.5; 7.5 MG/1; MG/1; MG/1; MG/1
30 CAPSULE, EXTENDED RELEASE ORAL DAILY
Qty: 30 CAPSULE | Refills: 0 | OUTPATIENT
Start: 2024-05-19

## 2024-07-16 ENCOUNTER — OFFICE VISIT (OUTPATIENT)
Dept: FAMILY MEDICINE | Facility: CLINIC | Age: 59
End: 2024-07-16
Payer: COMMERCIAL

## 2024-07-16 VITALS
OXYGEN SATURATION: 99 % | HEIGHT: 68 IN | HEART RATE: 75 BPM | RESPIRATION RATE: 12 BRPM | WEIGHT: 172.9 LBS | SYSTOLIC BLOOD PRESSURE: 127 MMHG | BODY MASS INDEX: 26.21 KG/M2 | DIASTOLIC BLOOD PRESSURE: 73 MMHG | TEMPERATURE: 98.3 F

## 2024-07-16 DIAGNOSIS — F90.0 ATTENTION DEFICIT HYPERACTIVITY DISORDER (ADHD), PREDOMINANTLY INATTENTIVE TYPE: ICD-10-CM

## 2024-07-16 DIAGNOSIS — E78.00 HYPERCHOLESTEREMIA: ICD-10-CM

## 2024-07-16 DIAGNOSIS — F41.1 GENERALIZED ANXIETY DISORDER: ICD-10-CM

## 2024-07-16 DIAGNOSIS — I47.11 INAPPROPRIATE SINUS TACHYCARDIA (H): ICD-10-CM

## 2024-07-16 DIAGNOSIS — Z80.42 FAMILY HISTORY OF PROSTATE CANCER: ICD-10-CM

## 2024-07-16 DIAGNOSIS — Z12.5 SCREENING FOR PROSTATE CANCER: ICD-10-CM

## 2024-07-16 DIAGNOSIS — N52.1 ERECTILE DYSFUNCTION DUE TO DISEASES CLASSIFIED ELSEWHERE: ICD-10-CM

## 2024-07-16 DIAGNOSIS — Z00.00 ANNUAL PHYSICAL EXAM: Primary | ICD-10-CM

## 2024-07-16 DIAGNOSIS — R73.01 IFG (IMPAIRED FASTING GLUCOSE): ICD-10-CM

## 2024-07-16 DIAGNOSIS — F32.9 MAJOR DEPRESSION, CHRONIC: ICD-10-CM

## 2024-07-16 PROBLEM — Z96.652 S/P TOTAL KNEE ARTHROPLASTY, LEFT: Status: RESOLVED | Noted: 2021-03-12 | Resolved: 2024-07-16

## 2024-07-16 PROBLEM — Z23 NEED FOR TDAP VACCINATION: Status: RESOLVED | Noted: 2022-02-10 | Resolved: 2024-07-16

## 2024-07-16 PROCEDURE — 99396 PREV VISIT EST AGE 40-64: CPT | Performed by: INTERNAL MEDICINE

## 2024-07-16 PROCEDURE — 99213 OFFICE O/P EST LOW 20 MIN: CPT | Mod: 25 | Performed by: INTERNAL MEDICINE

## 2024-07-16 RX ORDER — DEXTROAMPHETAMINE SACCHARATE, AMPHETAMINE ASPARTATE, DEXTROAMPHETAMINE SULFATE AND AMPHETAMINE SULFATE 2.5; 2.5; 2.5; 2.5 MG/1; MG/1; MG/1; MG/1
TABLET ORAL
Qty: 30 TABLET | Refills: 0 | Status: SHIPPED | OUTPATIENT
Start: 2024-07-16

## 2024-07-16 RX ORDER — DEXTROAMPHETAMINE SACCHARATE, AMPHETAMINE ASPARTATE MONOHYDRATE, DEXTROAMPHETAMINE SULFATE AND AMPHETAMINE SULFATE 7.5; 7.5; 7.5; 7.5 MG/1; MG/1; MG/1; MG/1
30 CAPSULE, EXTENDED RELEASE ORAL DAILY
Qty: 30 CAPSULE | Refills: 0 | Status: SHIPPED | OUTPATIENT
Start: 2024-07-16 | End: 2024-08-30

## 2024-07-16 RX ORDER — SIMVASTATIN 20 MG
20 TABLET ORAL AT BEDTIME
Qty: 90 TABLET | Refills: 3 | Status: SHIPPED | OUTPATIENT
Start: 2024-07-16 | End: 2024-08-30

## 2024-07-16 RX ORDER — TADALAFIL 10 MG/1
10 TABLET ORAL DAILY PRN
Qty: 30 TABLET | Refills: 3 | Status: SHIPPED | OUTPATIENT
Start: 2024-07-16

## 2024-07-16 SDOH — HEALTH STABILITY: PHYSICAL HEALTH: ON AVERAGE, HOW MANY MINUTES DO YOU ENGAGE IN EXERCISE AT THIS LEVEL?: 50 MIN

## 2024-07-16 SDOH — HEALTH STABILITY: PHYSICAL HEALTH: ON AVERAGE, HOW MANY DAYS PER WEEK DO YOU ENGAGE IN MODERATE TO STRENUOUS EXERCISE (LIKE A BRISK WALK)?: 6 DAYS

## 2024-07-16 ASSESSMENT — PATIENT HEALTH QUESTIONNAIRE - PHQ9
SUM OF ALL RESPONSES TO PHQ QUESTIONS 1-9: 3
10. IF YOU CHECKED OFF ANY PROBLEMS, HOW DIFFICULT HAVE THESE PROBLEMS MADE IT FOR YOU TO DO YOUR WORK, TAKE CARE OF THINGS AT HOME, OR GET ALONG WITH OTHER PEOPLE: SOMEWHAT DIFFICULT
SUM OF ALL RESPONSES TO PHQ QUESTIONS 1-9: 3

## 2024-07-16 ASSESSMENT — SOCIAL DETERMINANTS OF HEALTH (SDOH): HOW OFTEN DO YOU GET TOGETHER WITH FRIENDS OR RELATIVES?: TWICE A WEEK

## 2024-07-16 ASSESSMENT — PAIN SCALES - GENERAL: PAINLEVEL: NO PAIN (0)

## 2024-07-16 NOTE — PROGRESS NOTES
Preventive Care Visit  [facility]  Pal Wilks MD, Internal Medicine  Jul 16, 2024      Assessment & Plan     1.  Physical exam completed and overall good.  2.  Hypercholesterolemia being treated with simvastatin 20 mg a day..  Will check fasting lipids.  3.  ADHD predominantly inattentive type.  Uses Adderall on the days that did not work.  Refill will be sent to local [facility] pharmacy.  4.  Major depression chronic stable in remission continue fluoxetine 20 mg daily.  5.  Generalized anxiety disorder stable with fluoxetine.  6.  Inappropriate sinus tachycardia stable.  Has not been bothered much lately.  He feels it could be related to times of dehydration.  7.  Erectile dysfunction takes tadalafil on a as needed basis.  8.  Screening for prostate cancer.  Will check PSA  9.  Family history of brother with prostate cancer.      Will have fasting A1c, CBC CMP, PSA and lipids checked.  I will get back to the results.  Refills provided.  Discussed getting RSV vaccine.        [facility]  [address] [address]  Phone: [phone]  Primary Provider: Pal Wilks MD  Pre-op Performing Provider: Pal Wilks MD  Jul 16, 2024           Fax number for surgical facility:         Subjective   Al is a 59 year old, presenting for the following:  Physical          7/16/2024     4:49 PM   Additional Questions   Roomed by Sharon   Accompanied by self     HPI related to upcoming procedure:     Health Care Directive  Patient has a Health Care Directive on file      Patient Active Problem List    Diagnosis Date Noted    Family history of prostate cancer 07/16/2024     Priority: Medium    Tinnitus, bilateral 09/13/2023     Priority: Medium    Attention deficit hyperactivity disorder (ADHD), predominantly inattentive type 03/16/2023     Priority: Medium    Erectile dysfunction due to diseases classified elsewhere  02/10/2022     Priority: Medium    Need for zoster vaccination 02/10/2022     Priority: Medium    S/P total knee arthroplasty, left 03/12/2021     Priority: Medium    Inappropriate sinus tachycardia (H24) 03/04/2021     Priority: Medium    IFG (impaired fasting glucose) 10/20/2020     Priority: Medium    Other neutropenia (H24) 02/06/2020     Priority: Medium    Hypercholesteremia 11/13/2019     Priority: Medium    Major depression, chronic      Priority: Medium    Generalized anxiety disorder 01/01/1990     Priority: Medium      Past Medical History:   Diagnosis Date    Attention deficit hyperactivity disorder (ADHD), predominantly inattentive type 03/16/2023    Depressive disorder 2001    Erectile dysfunction due to diseases classified elsewhere 02/10/2022    Family history of prostate cancer 07/16/2024    Generalized anxiety disorder 1990    Inappropriate sinus tachycardia (H24) 03/04/2021    Major depression, chronic     Mild hyperlipidemia     Osteoarthritis of left knee     Other neutropenia (H24) 02/06/2020    S/P total knee arthroplasty, left 03/12/2021    Spontaneous pneumothorax 10/2010    Right side    Tinnitus, bilateral 09/13/2023     Past Surgical History:   Procedure Laterality Date    APPENDECTOMY OPEN N/A     ARTHROPLASTY KNEE Left 03/12/2021    Procedure: ARTHROPLASTY, KNEE, TOTAL LEFT;  Surgeon: Ton Jackson MD;  [facility]    ARTHROSCOPY KNEE Left 03/12/2021    Procedure: ARTHROSCOPY, KNEE LEFT;  Surgeon: Ton Jackson MD;  [facility]    AS TOTAL KNEE ARTHROPLASTY Left 03/12/2021    COLONOSCOPY  2017    HERNIA REPAIR Left     KNEE SURGERY Left 2003    ACL repair and partioal lateral manisectomy    LUNG SURGERY      ORTHOPEDIC SURGERY  2003    ACL repair left    [place] HAND/FINGER SURGERY UNLISTED      THORACOSCOPY Right 10/19/2010    for pneumothorax. upper lobe wedge resection     Current Outpatient Medications   Medication Sig Dispense Refill    amphetamine-dextroamphetamine  "(ADDERALL XR) 30 MG 24 hr capsule Take 1 capsule (30 mg) by mouth daily 30 capsule 0    amphetamine-dextroamphetamine (ADDERALL) 10 MG tablet One tablet in the afternoon daily 30 tablet 0    FLUoxetine (PROZAC) 20 MG capsule TAKE 1 CAPSULE DAILY (LAST REFILL, NEED APPOINTMENT WITH DR STEWART) 90 capsule 3    simvastatin (ZOCOR) 20 MG tablet Take 1 tablet (20 mg) by mouth At Bedtime 90 tablet 3    tadalafil (CIALIS) 10 MG tablet Take 1 tablet (10 mg) by mouth daily as needed (for elerectile dysfunction) 30 tablet 3       No Known Allergies     Social History     Tobacco Use    Smoking status: Never    Smokeless tobacco: Never    Tobacco comments:     na   Substance Use Topics    Alcohol use: Yes     Comment: infrequent     Family History   Problem Relation Age of Onset    Hyperlipidemia Mother     Melanoma Mother 85        eye    Multiple myeloma Father 60    Other Cancer Father         multiple myloma    Mental Illness Sister     Mental Illness Sister     Prostate Cancer Brother 50    Brain Cancer Paternal Grandfather 60    Depression Daughter      History   Drug Use Unknown             Review of Systems  Constitutional, HEENT, cardiovascular, pulmonary, GI, , musculoskeletal, neuro, skin, endocrine and psych systems are negative, except as otherwise noted.    Objective    /73 (BP Location: Right arm, Patient Position: Sitting, Cuff Size: Adult Regular)   Pulse 75   Temp 98.3  F (36.8  C) (Oral)   Resp 12   Ht 1.727 m (5' 8\")   Wt 78.4 kg (172 lb 14.4 oz)   SpO2 99%   BMI 26.29 kg/m     Estimated body mass index is 26.29 kg/m  as calculated from the following:    Height as of this encounter: 1.727 m (5' 8\").    Weight as of this encounter: 78.4 kg (172 lb 14.4 oz).  Working    Patient has been advised of split billing requirements and indicates understanding: Yes        BMI  Estimated body mass index is 26.29 kg/m  as calculated from the following:    Height as of this encounter: 1.727 m (5' 8\").    " Weight as of this encounter: 78.4 kg (172 lb 14.4 oz).       Counseling  Appropriate preventive services were discussed with this patient, including applicable screening as appropriate for fall prevention, nutrition, physical activity, Tobacco-use cessation, weight loss and cognition.  Checklist reviewing preventive services available has been given to the patient.  Reviewed patient's diet, addressing concerns and/or questions.   He is at risk for psychosocial distress and has been provided with information to reduce risk.       Subjective   Al is a 59 year old, presenting for the following:  Physical        7/16/2024     4:49 PM   Additional Questions   Roomed by Sharon   Accompanied by self        Health Care Directive  Patient has a Health Care Directive on file  Discussed advance care planning with patient.    HPIAnswers submitted by the patient for this visit:  Patient Health Questionnaire (Submitted on 7/16/2024)  If you checked off any problems, how difficult have these problems made it for you to do your work, take care of things at home, or get along with other people?: Somewhat difficult  PHQ9 TOTAL SCORE: 3    59-year-old gentleman comes in for annual physical examination and follow-up of chronic health issues which include hypercholesterolemia, depression/anxiety, ADHD.  Overall he is doing well.  No new issues.  He exercises regularly.  Takes his medication as prescribed.            7/16/2024   General Health   How would you rate your overall physical health? Excellent   Feel stress (tense, anxious, or unable to sleep) Only a little      (!) STRESS CONCERN      7/16/2024   Nutrition   Three or more servings of calcium each day? Yes   Diet: Regular (no restrictions)   How many servings of fruit and vegetables per day? 4 or more   How many sweetened beverages each day? 0-1            7/16/2024   Exercise   Days per week of moderate/strenous exercise 6 days   Average minutes spent exercising at this  level 50 min            7/16/2024   Social Factors   Frequency of gathering with friends or relatives Twice a week   Worry food won't last until get money to buy more No   Food not last or not have enough money for food? No   Do you have housing? (Housing is defined as stable permanent housing and does not include staying ouside in a car, in a tent, in an abandoned building, in an overnight shelter, or couch-surfing.) Yes   Are you worried about losing your housing? No   Lack of transportation? No   Unable to get utilities (heat,electricity)? No            7/16/2024   Fall Risk   Fallen 2 or more times in the past year? No   Trouble with walking or balance? No             7/16/2024   Dental   Dentist two times every year? Yes            7/16/2024   TB Screening   Were you born outside of the [place]? No          Today's PHQ-9 Score:       7/16/2024     4:41 PM   PHQ-9 SCORE   PHQ-9 Total Score MyChart 3 (Minimal depression)   PHQ-9 Total Score 3         7/16/2024   Substance Use   Alcohol more than 3/day or more than 7/wk No   Do you use any other substances recreationally? (!) ALCOHOL        Social History     Tobacco Use    Smoking status: Never    Smokeless tobacco: Never    Tobacco comments:     na   Vaping Use    Vaping status: Never Used   Substance Use Topics    Alcohol use: Yes     Comment: infrequent    Drug use: Not Currently     Types: Marijuana           7/16/2024   STI Screening   New sexual partner(s) since last STI/HIV test? No      Last PSA:   PSA   Date Value Ref Range Status   10/20/2020 1.04 0 - 4 ug/L Final     Comment:     Assay Method:  Chemiluminescence using Siemens Vista analyzer     Prostate Specific Antigen Screen   Date Value Ref Range Status   09/13/2023 1.26 0.00 - 3.50 ng/mL Final     PSA Tumor Marker   Date Value Ref Range Status   02/10/2022 1.44 0.00 - 4.00 ug/L Final     ASCVD Risk   The 10-year ASCVD risk score (Pj WHITE, et al., 2019) is: 8.5%    Values used to calculate the  score:      Age: 59 years      Sex: Male      Is Non- : No      Diabetic: No      Tobacco smoker: No      Systolic Blood Pressure: 127 mmHg      Is BP treated: No      HDL Cholesterol: 53 mg/dL      Total Cholesterol: 227 mg/dL    Fracture Risk Assessment Tool  Link to Frax Calculator  Use the information below to complete the Frax calculator  : 1965  Sex: male  Weight (kg): 78.4 kg (actual weight)  Height (cm): 172.7 cm  Previous Fragility Fracture:  No  History of parent with fractured hip:  No  Current Smoking:  No  Patient has been on glucocorticoids for more than 3 months (5mg/day or more): No  Rheumatoid Arthritis on Problem List:  No  Secondary Osteoporosis on Problem List:  No  Consumes 3 or more units of alcohol per day: No  Femoral Neck BMD (g/cm2)           Reviewed and updated as needed this visit by Provider                    Past Medical History:   Diagnosis Date    Attention deficit hyperactivity disorder (ADHD), predominantly inattentive type 2023    Depressive disorder     Erectile dysfunction due to diseases classified elsewhere 02/10/2022    Generalized anxiety disorder     Inappropriate sinus tachycardia (H24) 2021    Major depression, chronic     Mild hyperlipidemia     Osteoarthritis of left knee     Other neutropenia (H24) 2020    S/P total knee arthroplasty, left 2021    Spontaneous pneumothorax 10/2010    Right side    Tinnitus, bilateral 2023     Past Surgical History:   Procedure Laterality Date    APPENDECTOMY OPEN N/A     ARTHROPLASTY KNEE Left 2021    Procedure: ARTHROPLASTY, KNEE, TOTAL LEFT;  Surgeon: Ton Jackson MD;  [facility]    ARTHROSCOPY KNEE Left 2021    Procedure: ARTHROSCOPY, KNEE LEFT;  Surgeon: Ton Jackson MD;  [facility]    AS TOTAL KNEE ARTHROPLASTY Left 2021    COLONOSCOPY  2017    HERNIA REPAIR Left     KNEE SURGERY Left     ACL repair and partioal  lateral manisectomy    LUNG SURGERY      ORTHOPEDIC SURGERY  2003    ACL repair left    [place] HAND/FINGER SURGERY UNLISTED      THORACOSCOPY Right 10/19/2010    for pneumothorax. upper lobe wedge resection     BP Readings from Last 3 Encounters:   07/16/24 127/73   09/13/23 110/68   02/10/22 128/68    Wt Readings from Last 3 Encounters:   07/16/24 78.4 kg (172 lb 14.4 oz)   09/13/23 75.7 kg (166 lb 12.8 oz)   02/10/22 76.7 kg (169 lb 1.6 oz)                  Patient Active Problem List   Diagnosis    Major depression, chronic    Generalized anxiety disorder    Hypercholesteremia    Other neutropenia (H24)    IFG (impaired fasting glucose)    Inappropriate sinus tachycardia (H24)    S/P total knee arthroplasty, left    Erectile dysfunction due to diseases classified elsewhere    Need for Tdap vaccination    Need for zoster vaccination    Attention deficit hyperactivity disorder (ADHD), predominantly inattentive type    Tinnitus, bilateral     Past Surgical History:   Procedure Laterality Date    APPENDECTOMY OPEN N/A     ARTHROPLASTY KNEE Left 03/12/2021    Procedure: ARTHROPLASTY, KNEE, TOTAL LEFT;  Surgeon: Ton Jackson MD;  [facility]    ARTHROSCOPY KNEE Left 03/12/2021    Procedure: ARTHROSCOPY, KNEE LEFT;  Surgeon: Ton Jackson MD;  [facility]    AS TOTAL KNEE ARTHROPLASTY Left 03/12/2021    COLONOSCOPY  2017    HERNIA REPAIR Left     KNEE SURGERY Left 2003    ACL repair and partioal lateral manisectomy    LUNG SURGERY      ORTHOPEDIC SURGERY  2003    ACL repair left    [place] HAND/FINGER SURGERY UNLISTED      THORACOSCOPY Right 10/19/2010    for pneumothorax. upper lobe wedge resection       Social History     Tobacco Use    Smoking status: Never    Smokeless tobacco: Never    Tobacco comments:     na   Substance Use Topics    Alcohol use: Yes     Comment: infrequent     Family History   Problem Relation Age of Onset    Hyperlipidemia Mother     Melanoma Mother 85        eye    Multiple myeloma  "Father 60    Other Cancer Father         multiple myloma    Mental Illness Sister     Mental Illness Sister     Prostate Cancer Brother 50    Brain Cancer Paternal Grandfather 60    Depression Daughter          Current Outpatient Medications   Medication Sig Dispense Refill    amphetamine-dextroamphetamine (ADDERALL XR) 30 MG 24 hr capsule Take 1 capsule (30 mg) by mouth daily 30 capsule 0    amphetamine-dextroamphetamine (ADDERALL) 10 MG tablet One tablet in the afternoon daily 30 tablet 0    FLUoxetine (PROZAC) 20 MG capsule TAKE 1 CAPSULE DAILY (LAST REFILL, NEED APPOINTMENT WITH DR STEWART) 90 capsule 3    simvastatin (ZOCOR) 20 MG tablet Take 1 tablet (20 mg) by mouth At Bedtime 90 tablet 3    tadalafil (CIALIS) 10 MG tablet Take 1 tablet (10 mg) by mouth daily as needed (for elerectile dysfunction) 30 tablet 3     No Known Allergies      Review of Systems  Constitutional, HEENT, cardiovascular, pulmonary, GI, , musculoskeletal, neuro, skin, endocrine and psych systems are negative, except as otherwise noted.     Objective    Exam  /73 (BP Location: Right arm, Patient Position: Sitting, Cuff Size: Adult Regular)   Pulse 75   Temp 98.3  F (36.8  C) (Oral)   Resp (!) 7   Ht 1.727 m (5' 8\")   Wt 78.4 kg (172 lb 14.4 oz)   SpO2 99%   BMI 26.29 kg/m     Estimated body mass index is 26.29 kg/m  as calculated from the following:    Height as of this encounter: 1.727 m (5' 8\").    Weight as of this encounter: 78.4 kg (172 lb 14.4 oz).    Physical Exam  GENERAL: alert and no distress  EYES: Eyes grossly normal to inspection, PERRL and conjunctivae and sclerae normal  HENT: ear canals and TM's normal, nose and mouth without ulcers or lesions  NECK: no adenopathy, no asymmetry, masses, or scars  RESP: lungs clear to auscultation - no rales, rhonchi or wheezes  CV: regular rate and rhythm, normal S1 S2, no S3 or S4, no murmur, click or rub, no peripheral edema  ABDOMEN: soft, nontender, no hepatosplenomegaly, " no masses and bowel sounds normal   (male): normal male genitalia without lesions or urethral discharge, no hernia  MS: no gross musculoskeletal defects noted, no edema  SKIN: no suspicious lesions or rashes  NEURO: Normal strength and tone, mentation intact and speech normal  PSYCH: mentation appears normal, affect normal/bright  LYMPH: no cervical, supraclavicular, axillary, or inguinal adenopathy        Signed Electronically by: Pal Wilks MD

## 2024-08-14 ENCOUNTER — LAB (OUTPATIENT)
Dept: LAB | Facility: CLINIC | Age: 59
End: 2024-08-14
Payer: COMMERCIAL

## 2024-08-14 DIAGNOSIS — Z12.5 SCREENING FOR PROSTATE CANCER: ICD-10-CM

## 2024-08-14 DIAGNOSIS — Z00.00 ANNUAL PHYSICAL EXAM: ICD-10-CM

## 2024-08-14 DIAGNOSIS — E78.00 HYPERCHOLESTEREMIA: ICD-10-CM

## 2024-08-14 DIAGNOSIS — R73.01 IFG (IMPAIRED FASTING GLUCOSE): ICD-10-CM

## 2024-08-14 LAB
ALBUMIN SERPL BCG-MCNC: 4.5 G/DL (ref 3.5–5.2)
ALP SERPL-CCNC: 56 U/L (ref 40–150)
ALT SERPL W P-5'-P-CCNC: 33 U/L (ref 0–70)
ANION GAP SERPL CALCULATED.3IONS-SCNC: 14 MMOL/L (ref 7–15)
AST SERPL W P-5'-P-CCNC: 17 U/L (ref 0–45)
BILIRUB SERPL-MCNC: 0.5 MG/DL
BUN SERPL-MCNC: 16.6 MG/DL (ref 8–23)
CALCIUM SERPL-MCNC: 9.5 MG/DL (ref 8.8–10.4)
CHLORIDE SERPL-SCNC: 102 MMOL/L (ref 98–107)
CHOLEST SERPL-MCNC: 241 MG/DL
CREAT SERPL-MCNC: 1.16 MG/DL (ref 0.67–1.17)
EGFRCR SERPLBLD CKD-EPI 2021: 73 ML/MIN/1.73M2
ERYTHROCYTE [DISTWIDTH] IN BLOOD BY AUTOMATED COUNT: 12.7 % (ref 10–15)
FASTING STATUS PATIENT QL REPORTED: YES
FASTING STATUS PATIENT QL REPORTED: YES
GLUCOSE SERPL-MCNC: 155 MG/DL (ref 70–99)
HBA1C MFR BLD: 5.9 % (ref 0–5.6)
HCO3 SERPL-SCNC: 23 MMOL/L (ref 22–29)
HCT VFR BLD AUTO: 39.6 % (ref 40–53)
HDLC SERPL-MCNC: 57 MG/DL
HGB BLD-MCNC: 13.2 G/DL (ref 13.3–17.7)
LDLC SERPL CALC-MCNC: 162 MG/DL
MCH RBC QN AUTO: 29.8 PG (ref 26.5–33)
MCHC RBC AUTO-ENTMCNC: 33.3 G/DL (ref 31.5–36.5)
MCV RBC AUTO: 89 FL (ref 78–100)
NONHDLC SERPL-MCNC: 184 MG/DL
PLATELET # BLD AUTO: 226 10E3/UL (ref 150–450)
POTASSIUM SERPL-SCNC: 3.9 MMOL/L (ref 3.4–5.3)
PROT SERPL-MCNC: 7 G/DL (ref 6.4–8.3)
PSA SERPL DL<=0.01 NG/ML-MCNC: 1.85 NG/ML (ref 0–3.5)
RBC # BLD AUTO: 4.43 10E6/UL (ref 4.4–5.9)
SODIUM SERPL-SCNC: 139 MMOL/L (ref 135–145)
TRIGL SERPL-MCNC: 111 MG/DL
WBC # BLD AUTO: 3.6 10E3/UL (ref 4–11)

## 2024-08-14 PROCEDURE — 80061 LIPID PANEL: CPT

## 2024-08-14 PROCEDURE — 83036 HEMOGLOBIN GLYCOSYLATED A1C: CPT

## 2024-08-14 PROCEDURE — 36415 COLL VENOUS BLD VENIPUNCTURE: CPT

## 2024-08-14 PROCEDURE — G0103 PSA SCREENING: HCPCS

## 2024-08-14 PROCEDURE — 80053 COMPREHEN METABOLIC PANEL: CPT

## 2024-08-14 PROCEDURE — 85027 COMPLETE CBC AUTOMATED: CPT | Mod: GZ

## 2024-08-15 ENCOUNTER — MYC MEDICAL ADVICE (OUTPATIENT)
Dept: FAMILY MEDICINE | Facility: CLINIC | Age: 59
End: 2024-08-15

## 2024-08-15 DIAGNOSIS — E78.00 HYPERCHOLESTEREMIA: Primary | ICD-10-CM

## 2024-08-15 NOTE — TELEPHONE ENCOUNTER
"Nurse Triage SBAR    Situation: Pt responding to lab results message.    Background: Labs drawn at preventative exam on 8/14/24. Blood sugar was 155, A1C 5.9. MD recommended low carb diet and recheck in 6 mo. . MD recommended doubling dose of simvistatin to 40mg or switching to atorvastatin 20mg.    Assessment: Pt writing back and would like to double his simvistatin to 40mg. Would also like to try a continuous glucose monitor, \"in an attempt to track cause/effect of glucose,\" and see if he can dial in his diet and lower glucose with diet and lifestyle changes. Would like to know if you can prescribe him a CGM and if there is value in learning from CGM as a tool for managing blood sugar.    Recommendation: Routing to provider to review and advise. Medication order pended.      Renita Sanchez, RN, BSN  [facility]       "

## 2024-08-30 ENCOUNTER — MYC REFILL (OUTPATIENT)
Dept: FAMILY MEDICINE | Facility: CLINIC | Age: 59
End: 2024-08-30
Payer: COMMERCIAL

## 2024-08-30 ENCOUNTER — TELEPHONE (OUTPATIENT)
Dept: FAMILY MEDICINE | Facility: CLINIC | Age: 59
End: 2024-08-30
Payer: COMMERCIAL

## 2024-08-30 ENCOUNTER — MYC MEDICAL ADVICE (OUTPATIENT)
Dept: FAMILY MEDICINE | Facility: CLINIC | Age: 59
End: 2024-08-30
Payer: COMMERCIAL

## 2024-08-30 DIAGNOSIS — E78.00 HYPERCHOLESTEREMIA: ICD-10-CM

## 2024-08-30 DIAGNOSIS — F90.0 ATTENTION DEFICIT HYPERACTIVITY DISORDER (ADHD), PREDOMINANTLY INATTENTIVE TYPE: ICD-10-CM

## 2024-08-30 DIAGNOSIS — F32.9 MAJOR DEPRESSION, CHRONIC: ICD-10-CM

## 2024-08-30 RX ORDER — SIMVASTATIN 20 MG
20 TABLET ORAL AT BEDTIME
Qty: 90 TABLET | Refills: 3 | OUTPATIENT
Start: 2024-08-30

## 2024-08-30 RX ORDER — DEXTROAMPHETAMINE SACCHARATE, AMPHETAMINE ASPARTATE MONOHYDRATE, DEXTROAMPHETAMINE SULFATE AND AMPHETAMINE SULFATE 7.5; 7.5; 7.5; 7.5 MG/1; MG/1; MG/1; MG/1
30 CAPSULE, EXTENDED RELEASE ORAL DAILY
Qty: 30 CAPSULE | Refills: 0 | Status: SHIPPED | OUTPATIENT
Start: 2024-08-30

## 2024-08-30 RX ORDER — SIMVASTATIN 40 MG
40 TABLET ORAL AT BEDTIME
Qty: 90 TABLET | Refills: 3 | Status: SHIPPED | OUTPATIENT
Start: 2024-08-30

## 2024-08-30 RX ORDER — SIMVASTATIN 20 MG
20 TABLET ORAL AT BEDTIME
Qty: 90 TABLET | Refills: 3 | Status: SHIPPED | OUTPATIENT
Start: 2024-08-30 | End: 2024-08-30

## 2024-08-30 NOTE — TELEPHONE ENCOUNTER
S: Called pt to discuss his medication questions.    B/A: Pt states his Rx's from 7/16 were sent to the incorrect pharmacy ([facility], [place], [place]) and he would like them [facility].    He also had his Simvastatin dosage increased to 40mg/day at his last visit (see provider note below) and needs the Rx updated to reflect that increase. It currently has him at 20mg/day, but he states he has been taking 40mg/day.        Lastly, his Fluoxitiene needs to be sent to a local pharmacy for a 30 days supply because he is completely out.     R: Will update pharmacies in pt chart and route message to PCP to update his Simvastatin to 40mg/day. Medication and pharmacy pended.    Renita Sanchez RN, BSN  [facility]

## 2024-08-30 NOTE — TELEPHONE ENCOUNTER
Routing to refill team to review and advise on refill. Med and pharmacy pended.    Timothy Adams RN  [facility]

## 2024-09-30 DIAGNOSIS — F32.9 MAJOR DEPRESSION, CHRONIC: ICD-10-CM

## 2024-09-30 NOTE — TELEPHONE ENCOUNTER
Prescription approved per [facility] Refill Protocol.  Latrice Kovacs RN  [facility]

## 2024-10-24 ENCOUNTER — MYC REFILL (OUTPATIENT)
Dept: FAMILY MEDICINE | Facility: CLINIC | Age: 59
End: 2024-10-24
Payer: COMMERCIAL

## 2024-10-24 DIAGNOSIS — F90.0 ATTENTION DEFICIT HYPERACTIVITY DISORDER (ADHD), PREDOMINANTLY INATTENTIVE TYPE: ICD-10-CM

## 2024-10-24 RX ORDER — DEXTROAMPHETAMINE SACCHARATE, AMPHETAMINE ASPARTATE MONOHYDRATE, DEXTROAMPHETAMINE SULFATE AND AMPHETAMINE SULFATE 5; 5; 5; 5 MG/1; MG/1; MG/1; MG/1
20 CAPSULE, EXTENDED RELEASE ORAL DAILY
Qty: 30 CAPSULE | Refills: 0 | Status: SHIPPED | OUTPATIENT
Start: 2024-10-24 | End: 2024-11-23

## 2024-10-24 RX ORDER — DEXTROAMPHETAMINE SACCHARATE, AMPHETAMINE ASPARTATE MONOHYDRATE, DEXTROAMPHETAMINE SULFATE AND AMPHETAMINE SULFATE 7.5; 7.5; 7.5; 7.5 MG/1; MG/1; MG/1; MG/1
30 CAPSULE, EXTENDED RELEASE ORAL DAILY
Qty: 30 CAPSULE | Refills: 0 | Status: CANCELLED | OUTPATIENT
Start: 2024-10-24

## 2024-10-24 RX ORDER — DEXTROAMPHETAMINE SACCHARATE, AMPHETAMINE ASPARTATE MONOHYDRATE, DEXTROAMPHETAMINE SULFATE AND AMPHETAMINE SULFATE 5; 5; 5; 5 MG/1; MG/1; MG/1; MG/1
20 CAPSULE, EXTENDED RELEASE ORAL DAILY
Qty: 30 CAPSULE | Refills: 0 | Status: SHIPPED | OUTPATIENT
Start: 2024-11-23 | End: 2024-12-23

## 2024-10-24 RX ORDER — DEXTROAMPHETAMINE SACCHARATE, AMPHETAMINE ASPARTATE MONOHYDRATE, DEXTROAMPHETAMINE SULFATE AND AMPHETAMINE SULFATE 5; 5; 5; 5 MG/1; MG/1; MG/1; MG/1
20 CAPSULE, EXTENDED RELEASE ORAL DAILY
Qty: 30 CAPSULE | Refills: 0 | Status: SHIPPED | OUTPATIENT
Start: 2024-12-23 | End: 2025-01-22

## 2025-01-15 ASSESSMENT — PATIENT HEALTH QUESTIONNAIRE - PHQ9
SUM OF ALL RESPONSES TO PHQ QUESTIONS 1-9: 4
SUM OF ALL RESPONSES TO PHQ QUESTIONS 1-9: 4
10. IF YOU CHECKED OFF ANY PROBLEMS, HOW DIFFICULT HAVE THESE PROBLEMS MADE IT FOR YOU TO DO YOUR WORK, TAKE CARE OF THINGS AT HOME, OR GET ALONG WITH OTHER PEOPLE: NOT DIFFICULT AT ALL

## 2025-01-16 ENCOUNTER — VIRTUAL VISIT (OUTPATIENT)
Dept: FAMILY MEDICINE | Facility: CLINIC | Age: 60
End: 2025-01-16
Payer: COMMERCIAL

## 2025-01-16 ENCOUNTER — MYC REFILL (OUTPATIENT)
Dept: FAMILY MEDICINE | Facility: CLINIC | Age: 60
End: 2025-01-16

## 2025-01-16 DIAGNOSIS — F90.0 ATTENTION DEFICIT HYPERACTIVITY DISORDER (ADHD), PREDOMINANTLY INATTENTIVE TYPE: Primary | ICD-10-CM

## 2025-01-16 DIAGNOSIS — F41.1 GENERALIZED ANXIETY DISORDER: ICD-10-CM

## 2025-01-16 DIAGNOSIS — F32.9 MAJOR DEPRESSION, CHRONIC: ICD-10-CM

## 2025-01-16 RX ORDER — DEXTROAMPHETAMINE SACCHARATE, AMPHETAMINE ASPARTATE MONOHYDRATE, DEXTROAMPHETAMINE SULFATE AND AMPHETAMINE SULFATE 5; 5; 5; 5 MG/1; MG/1; MG/1; MG/1
20 CAPSULE, EXTENDED RELEASE ORAL DAILY
Qty: 30 CAPSULE | Refills: 0 | Status: SHIPPED | OUTPATIENT
Start: 2025-01-20

## 2025-01-16 NOTE — PROGRESS NOTES
"  If patient has telephone visit, have they been educated on video visit as preferred visit method and offered to change to video visit? N/A        Instructions Relayed to Patient by Virtual Roomer:     Patient is active on [employer]:   Relayed following to patient: \"It looks like you are active on [employer], are you able to join the visit this way? If not, do you need us to send you a link now or would you like your provider to send a link via text or email when they are ready to initiate the visit?\"      Patient Confirmed they will join visit via: [place]  Reminded patient to ensure they were logged on to virtual visit by arrival time listed.   Asked if patient has flexibility to initiate visit sooner than arrival time: patient is unable to initiate visit earlier than arrival time     If pediatric virtual visit, ensured pediatric patient along with parent/guardian will be present for video visit.     Patient offered the website [email or web address] and/or phone number to [employer] Help line: [phone]      Al is a 59 year old who is being evaluated via a billable video visit.    How would you like to obtain your AVS? [employer]  If the video visit is dropped, the invitation should be resent by: Text to cell phone: [phone]  Will anyone else be joining your video visit? No      Assessment & Plan     1.  Attention deficit hyperactivity disorder predominantly inattentive type currently on Adderall XR 20 mg daily and additional 10 mg Adderall in the afternoon as needed.  Currently doing well.  Refill provided.  2.  Generalized anxiety disorder well-controlled with fluoxetine 20 mg a day will continue same.    Patient to return for annual checkup in July.    BMI  Estimated body mass index is 26.29 kg/m  as calculated from the following:    Height as of 7/16/24: 1.727 m (5' 8\").    Weight as of 7/16/24: 78.4 kg (172 lb 14.4 oz).       Subjective   Al is a 59 year old, presenting for the following health " issues:  Recheck Medication (ADHD meds )      1/16/2025    12:14 PM   Additional Questions   Roomed by Melissa HUANG   Accompanied by Self     HPI     Anxiety   How are you doing with your anxiety since your last visit? No change - pt feels like he's on a good maintenance dose of his anxiety medication   Are you having other symptoms that might be associated with anxiety? No  Have you had a significant life event? No   Are you feeling depressed? No  Do you have any concerns with your use of alcohol or other drugs? No    Social History     Tobacco Use    Smoking status: Never    Smokeless tobacco: Never    Tobacco comments:     na   Vaping Use    Vaping status: Never Used   Substance Use Topics    Alcohol use: Yes     Comment: infrequent    Drug use: Not Currently     Types: Marijuana         2/9/2022     9:32 AM 5/9/2022     4:32 AM 2/1/2023     8:26 AM   JESSIKA-7 SCORE   Total Score 6 (mild anxiety) 8 (mild anxiety)    Total Score 6 8 4         5/30/2023    10:44 AM 7/16/2024     4:41 PM 1/15/2025     1:25 PM   PHQ   PHQ-9 Total Score 4 3 4    Q9: Thoughts of better off dead/self-harm past 2 weeks Not at all Not at all Not at all       Patient-reported         1/15/2025     1:25 PM   Last PHQ-9   1.  Little interest or pleasure in doing things 0   2.  Feeling down, depressed, or hopeless 0   3.  Trouble falling or staying asleep, or sleeping too much 0   4.  Feeling tired or having little energy 1   5.  Poor appetite or overeating 1   6.  Feeling bad about yourself 1   7.  Trouble concentrating 1   8.  Moving slowly or restless 0   Q9: Thoughts of better off dead/self-harm past 2 weeks 0   PHQ-9 Total Score 4        Patient-reported         2/1/2023     8:26 AM   JESSIKA-7    1. Feeling nervous, anxious, or on edge 1   2. Not being able to stop or control worrying 1   3. Worrying too much about different things 0   4. Trouble relaxing 0   5. Being so restless that it is hard to sit still 2   6. Becoming easily annoyed or  irritable 0   7. Feeling afraid, as if something awful might happen 0   JESSIKA-7 Total Score 4         Medication Followup of Adderall XR  Taking Medication as prescribed: yes  Side Effects:  None  Medication Helping Symptoms:  yes    59-year-old gentleman scheduled visit for follow-up on generalized anxiety disorder as well as ADHD.  He is overall doing well.  Working from home.  Both issues seem well managed with current medication.  Offers no new concerns.    Review of Systems  Constitutional, HEENT, cardiovascular, pulmonary, GI, , musculoskeletal, neuro, skin, endocrine and psych systems are negative, except as otherwise noted.      Objective           Vitals:  No vitals were obtained today due to virtual visit.    Physical Exam   GENERAL: alert and no distress  EYES: Eyes grossly normal to inspection.  No discharge or erythema, or obvious scleral/conjunctival abnormalities.  RESP: No audible wheeze, cough, or visible cyanosis.    SKIN: Visible skin clear. No significant rash, abnormal pigmentation or lesions.  NEURO: Cranial nerves grossly intact.  Mentation and speech appropriate for age.  PSYCH: Appropriate affect, tone, and pace of words          Video-Visit Details    Type of service:  Video Visit   Originating Location (pt. Location): Home    Distant Location (provider location):  On-site  Platform used for Video Visit: Michele  Signed Electronically by: Pal Wilks MD    Answers submitted by the patient for this visit:  Patient Health Questionnaire (Submitted on 1/15/2025)  If you checked off any problems, how difficult have these problems made it for you to do your work, take care of things at home, or get along with other people?: Not difficult at all  PHQ9 TOTAL SCORE: 4

## 2025-01-16 NOTE — TELEPHONE ENCOUNTER
Clinic RN: Please investigate patient's chart or contact patient if the information cannot be found because patient should have run out of this medication on 10/2024. Confirm patient is taking this medication as prescribed. Document findings and route refill encounter to provider for approval or denial.

## 2025-01-16 NOTE — TELEPHONE ENCOUNTER
Responded to patient [employer] message requesting additional information on gap in prescription. Dejan Enriquez, RN, BSN

## 2025-01-17 NOTE — TELEPHONE ENCOUNTER
Patient reports needing refill, would like to go to [facility]. Per chart review, patient had visit with provider yesterday and it is noted that he is doing well:        Routing to provider for refill. Dejan Enriquez RN, BSN

## 2025-01-28 RX ORDER — SIMVASTATIN 40 MG
40 TABLET ORAL AT BEDTIME
Qty: 90 TABLET | Refills: 1 | Status: SHIPPED | OUTPATIENT
Start: 2025-01-28

## 2025-02-21 ENCOUNTER — MYC MEDICAL ADVICE (OUTPATIENT)
Dept: FAMILY MEDICINE | Facility: CLINIC | Age: 60
End: 2025-02-21
Payer: COMMERCIAL

## 2025-02-21 DIAGNOSIS — Z01.89 PATIENT REQUESTED DIAGNOSTIC TESTING: ICD-10-CM

## 2025-02-21 DIAGNOSIS — R73.01 IFG (IMPAIRED FASTING GLUCOSE): Primary | ICD-10-CM

## 2025-02-21 DIAGNOSIS — E78.00 HYPERCHOLESTEREMIA: ICD-10-CM

## 2025-03-10 ENCOUNTER — ANCILLARY PROCEDURE (OUTPATIENT)
Dept: CT IMAGING | Facility: CLINIC | Age: 60
End: 2025-03-10
Attending: INTERNAL MEDICINE
Payer: COMMERCIAL

## 2025-03-10 DIAGNOSIS — Z01.89 PATIENT REQUESTED DIAGNOSTIC TESTING: ICD-10-CM

## 2025-03-10 DIAGNOSIS — E78.00 HYPERCHOLESTEREMIA: ICD-10-CM

## 2025-03-10 DIAGNOSIS — R73.01 IFG (IMPAIRED FASTING GLUCOSE): ICD-10-CM

## 2025-03-10 PROCEDURE — 75571 CT HRT W/O DYE W/CA TEST: CPT | Mod: GA | Performed by: INTERNAL MEDICINE

## 2025-03-19 ENCOUNTER — OFFICE VISIT (OUTPATIENT)
Dept: FAMILY MEDICINE | Facility: CLINIC | Age: 60
End: 2025-03-19
Payer: COMMERCIAL

## 2025-03-19 VITALS
SYSTOLIC BLOOD PRESSURE: 113 MMHG | TEMPERATURE: 97.5 F | DIASTOLIC BLOOD PRESSURE: 75 MMHG | RESPIRATION RATE: 12 BRPM | OXYGEN SATURATION: 99 % | WEIGHT: 174 LBS | HEIGHT: 68 IN | HEART RATE: 75 BPM | BODY MASS INDEX: 26.37 KG/M2

## 2025-03-19 DIAGNOSIS — F90.0 ATTENTION DEFICIT HYPERACTIVITY DISORDER (ADHD), PREDOMINANTLY INATTENTIVE TYPE: ICD-10-CM

## 2025-03-19 DIAGNOSIS — I47.11 INAPPROPRIATE SINUS TACHYCARDIA: ICD-10-CM

## 2025-03-19 DIAGNOSIS — E78.00 HYPERCHOLESTEREMIA: ICD-10-CM

## 2025-03-19 DIAGNOSIS — Z01.818 PREOP GENERAL PHYSICAL EXAM: Primary | ICD-10-CM

## 2025-03-19 DIAGNOSIS — M72.2 PLANTAR FASCIITIS OF RIGHT FOOT: ICD-10-CM

## 2025-03-19 DIAGNOSIS — R73.01 IFG (IMPAIRED FASTING GLUCOSE): ICD-10-CM

## 2025-03-19 LAB
ANION GAP SERPL CALCULATED.3IONS-SCNC: 10 MMOL/L (ref 7–15)
BUN SERPL-MCNC: 13.5 MG/DL (ref 8–23)
CALCIUM SERPL-MCNC: 9.7 MG/DL (ref 8.8–10.4)
CHLORIDE SERPL-SCNC: 100 MMOL/L (ref 98–107)
CREAT SERPL-MCNC: 1.05 MG/DL (ref 0.67–1.17)
EGFRCR SERPLBLD CKD-EPI 2021: 82 ML/MIN/1.73M2
ERYTHROCYTE [DISTWIDTH] IN BLOOD BY AUTOMATED COUNT: 12.6 % (ref 10–15)
EST. AVERAGE GLUCOSE BLD GHB EST-MCNC: 123 MG/DL
GLUCOSE SERPL-MCNC: 146 MG/DL (ref 70–99)
HBA1C MFR BLD: 5.9 % (ref 0–5.6)
HCO3 SERPL-SCNC: 29 MMOL/L (ref 22–29)
HCT VFR BLD AUTO: 43.9 % (ref 40–53)
HGB BLD-MCNC: 14.6 G/DL (ref 13.3–17.7)
MCH RBC QN AUTO: 29 PG (ref 26.5–33)
MCHC RBC AUTO-ENTMCNC: 33.3 G/DL (ref 31.5–36.5)
MCV RBC AUTO: 87 FL (ref 78–100)
PLATELET # BLD AUTO: 272 10E3/UL (ref 150–450)
POTASSIUM SERPL-SCNC: 4.5 MMOL/L (ref 3.4–5.3)
RBC # BLD AUTO: 5.03 10E6/UL (ref 4.4–5.9)
SODIUM SERPL-SCNC: 139 MMOL/L (ref 135–145)
WBC # BLD AUTO: 4.2 10E3/UL (ref 4–11)

## 2025-03-19 PROCEDURE — 3078F DIAST BP <80 MM HG: CPT

## 2025-03-19 PROCEDURE — 83036 HEMOGLOBIN GLYCOSYLATED A1C: CPT

## 2025-03-19 PROCEDURE — 99214 OFFICE O/P EST MOD 30 MIN: CPT

## 2025-03-19 PROCEDURE — 1125F AMNT PAIN NOTED PAIN PRSNT: CPT

## 2025-03-19 PROCEDURE — 85027 COMPLETE CBC AUTOMATED: CPT

## 2025-03-19 PROCEDURE — 3074F SYST BP LT 130 MM HG: CPT

## 2025-03-19 PROCEDURE — 36415 COLL VENOUS BLD VENIPUNCTURE: CPT

## 2025-03-19 ASSESSMENT — ENCOUNTER SYMPTOMS
COUGH: 0
SINUS PAIN: 0
NAUSEA: 0
SHORTNESS OF BREATH: 0
VOMITING: 0
FEVER: 0
FATIGUE: 0
NUMBNESS: 0
SINUS PRESSURE: 0
WHEEZING: 0
CHEST TIGHTNESS: 0
WOUND: 0
RHINORRHEA: 0
DIARRHEA: 0
CONSTIPATION: 0
SORE THROAT: 0
FREQUENCY: 0
HEADACHES: 0
DYSURIA: 0
PALPITATIONS: 0
CHILLS: 0
DIZZINESS: 0
ABDOMINAL PAIN: 0

## 2025-03-19 ASSESSMENT — PAIN SCALES - GENERAL: PAINLEVEL_OUTOF10: MODERATE PAIN (5)

## 2025-03-19 NOTE — PATIENT INSTRUCTIONS
At [facility], we strive to deliver an exceptional experience to you, every time we see you. If you receive a survey, please let us know what we are doing well and/or what we could improve upon, as we do value your feedback.  If you have MyChart, you can expect to receive results automatically within 24 hours of their completion.  Your provider will send a note interpreting your results as well.   If you do not have MyChart, you should receive your results in about a week by mail.    Your care team:                            Family Medicine Internal Medicine   MD Henok Paige, MD Lisandra Ivy, MD Bereket Campos, MD Ros Vaz, PALalithaC    Dilip Su, MD Pediatrics   Noni Eli, MD Divine Thomas, MD Analisa Mustafa, APRN CNP Dalia Rush APRN CNP   Ran Evans, MD Mari Oliveira, MD Marisol Guo, CNP     Dwight Curry, CNP Same-Day Provider (No follow-up visits)   PRASHANTH Wu, DNP Alexia Vivar, PRASHANTH Stein, FNP, BC MARITZA HoranC     Clinic hours: Monday - Thursday 7 am-6 pm; Fridays 7 am-5 pm.   Urgent care: Monday - Friday 10 am- 8 pm; Saturday and Sunday 9 am-5 pm.    Clinic: [phone]       [facility]: Monday - Thursday 8 am - 7 pm; Friday 8 am - 6 pm  [facility]: [phone]    How to Take Your Medication Before Surgery  Preoperative Medication Instructions   Antiplatelet or Anticoagulation Medication Instructions   - We reviewed the medication list and the patient is not on an antiplatelet or anticoagulation medications.    Additional Medication Instructions  Take all scheduled medications on the day of surgery EXCEPT for modifications listed below:   - Herbal medications and vitamins: DO NOT TAKE 14 days prior to surgery.   - ibuprofen (Advil, Motrin): DO NOT TAKE 1 day before surgery.    - Psychostimulants: Hold the day of  surgery   - tadalafil: DO NOT TAKE for 3 days (72 hours) prior to surgery.     Patient Education   Preparing for Your Surgery  For Adults  Getting started  In most cases, a nurse will call to review your health history and instructions. They will give you an arrival time based on your scheduled surgery time. Please be ready to share:  Your doctor's clinic name and phone number  Your medical, surgical, and anesthesia history  A list of allergies and sensitivities  A list of medicines, including herbal treatments and over-the-counter drugs  Whether the patient has a legal guardian (ask how to send us the papers in advance)  Note: You may not receive a call if you were seen at our PAC (Preoperative Assessment Center).  Please tell us if you're pregnant--or if there's any chance you might be pregnant. Some surgeries may injure a fetus (unborn baby), so they require a pregnancy test. Surgeries that are safe for a fetus don't always need a test, and you can choose whether to have one.   Preparing for surgery  Within 10 to 30 days of surgery: Have a pre-op exam (sometimes called an H&P, or History and Physical). This can be done at a clinic or pre-operative center.  If you're having a , you may not need this exam. Talk to your care team.  At your pre-op exam, talk to your care team about all medicines you take. (This includes CBD oil and any drugs, such as THC, marijuana, and other forms of cannabis.) If you need to stop any medicine before surgery, ask when to start taking it again.  This is for your safety. Many medicines and drugs can make you bleed too much during surgery. Some change how well surgery (anesthesia) drugs work.  Call your insurance company to let them know you're having surgery. (If you don't have insurance, call [phone].)  Call your clinic if there's any change in your health. This includes a scrape or scratch near the surgery site, or any signs of a cold (sore throat, runny nose, cough,  rash, fever).  Eating and drinking guidelines  For your safety: Unless your surgeon tells you otherwise, follow the guidelines below.  Eat and drink as normal until 8 hours before you arrive for surgery. After that, no food or milk. You can spit out gum when you arrive.  Drink clear liquids until 2 hours before you arrive. These are liquids you can see through, like water, Gatorade, and Propel Water. They also include plain black coffee and tea (no cream or milk).  No alcohol for 24 hours before you arrive. The night before surgery, stop any drinks that contain THC.  If your care team tells you to take medicine on the morning of surgery, it's okay to take it with a sip of water. No other medicines or drugs are allowed (including CBD oil)--follow your care team's instructions.  If you have questions the day of surgery, call your hospital or surgery center.   Preventing infection  Shower or bathe the night before and the morning of surgery. Follow the instructions your clinic gave you. (If no instructions, use regular soap.)  Don't shave or clip hair near your surgery site. We'll remove the hair if needed.  Don't smoke or vape the morning of surgery. No chewing tobacco for 6 hours before you arrive. A nicotine patch is okay. You may spit out nicotine gum when you arrive.  For some surgeries, the surgeon will tell you to fully quit smoking and nicotine.  We will make every effort to keep you safe from infection. We will:  Clean our hands often with soap and water (or an alcohol-based hand rub).  Clean the skin at your surgery site with a special soap that kills germs.  Give you a special gown to keep you warm. (Cold raises the risk of infection.)  Wear hair covers, masks, gowns, and gloves during surgery.  Give antibiotic medicine, if prescribed. Not all surgeries need this medicine.  What to bring on the day of surgery  Photo ID and insurance card  Copy of your health care directive, if you have one  Glasses and  hearing aids (bring cases)  You can't wear contacts during surgery  Inhaler and eye drops, if you use them (tell us about these when you arrive)  CPAP machine or breathing device, if you use them  A few personal items, if spending the night  If you have . . .  A pacemaker, ICD (cardiac defibrillator), or other implant: Bring the ID card.  An implanted stimulator: Bring the remote control.  A legal guardian: Bring a copy of the certified (court-stamped) guardianship papers.  Please remove any jewelry, including body piercings. Leave jewelry and other valuables at home.  If you're going home the day of surgery  You must have a responsible adult drive you home. They should stay with you overnight as well.  If you don't have someone to stay with you, and you aren't safe to go home alone, we may keep you overnight. Insurance often won't pay for this.  After surgery  If it's hard to control your pain or you need more pain medicine, please call your surgeon's office.  Questions?   If you have any questions for your care team, list them here:   ____________________________________________________________________________________________________________________________________________________________________________________________________________________________________________________________  For informational purposes only. Not to replace the advice of your health care provider. Copyright   2003, 2019 [employer] [facility]. All rights reserved. Clinically reviewed by Dony Dick MD. IPTEGO [id number] - REV 08/24.     Patient Education   Preparing for Your Surgery  For Adults  Getting started  In most cases, a nurse will call to review your health history and instructions. They will give you an arrival time based on your scheduled surgery time. Please be ready to share:  Your doctor's clinic name and phone number  Your medical, surgical, and anesthesia history  A list of allergies and sensitivities  A list of  medicines, including herbal treatments and over-the-counter drugs  Whether the patient has a legal guardian (ask how to send us the papers in advance)  Note: You may not receive a call if you were seen at our PAC (Preoperative Assessment Center).  Please tell us if you're pregnant--or if there's any chance you might be pregnant. Some surgeries may injure a fetus (unborn baby), so they require a pregnancy test. Surgeries that are safe for a fetus don't always need a test, and you can choose whether to have one.   Preparing for surgery  Within 10 to 30 days of surgery: Have a pre-op exam (sometimes called an H&P, or History and Physical). This can be done at a clinic or pre-operative center.  If you're having a , you may not need this exam. Talk to your care team.  At your pre-op exam, talk to your care team about all medicines you take. (This includes CBD oil and any drugs, such as THC, marijuana, and other forms of cannabis.) If you need to stop any medicine before surgery, ask when to start taking it again.  This is for your safety. Many medicines and drugs can make you bleed too much during surgery. Some change how well surgery (anesthesia) drugs work.  Call your insurance company to let them know you're having surgery. (If you don't have insurance, call [phone].)  Call your clinic if there's any change in your health. This includes a scrape or scratch near the surgery site, or any signs of a cold (sore throat, runny nose, cough, rash, fever).  Eating and drinking guidelines  For your safety: Unless your surgeon tells you otherwise, follow the guidelines below.  Eat and drink as normal until 8 hours before you arrive for surgery. After that, no food or milk. You can spit out gum when you arrive.  Drink clear liquids until 2 hours before you arrive. These are liquids you can see through, like water, Gatorade, and Propel Water. They also include plain black coffee and tea (no cream or milk).  No  alcohol for 24 hours before you arrive. The night before surgery, stop any drinks that contain THC.  If your care team tells you to take medicine on the morning of surgery, it's okay to take it with a sip of water. No other medicines or drugs are allowed (including CBD oil)--follow your care team's instructions.  If you have questions the day of surgery, call your hospital or surgery center.   Preventing infection  Shower or bathe the night before and the morning of surgery. Follow the instructions your clinic gave you. (If no instructions, use regular soap.)  Don't shave or clip hair near your surgery site. We'll remove the hair if needed.  Don't smoke or vape the morning of surgery. No chewing tobacco for 6 hours before you arrive. A nicotine patch is okay. You may spit out nicotine gum when you arrive.  For some surgeries, the surgeon will tell you to fully quit smoking and nicotine.  We will make every effort to keep you safe from infection. We will:  Clean our hands often with soap and water (or an alcohol-based hand rub).  Clean the skin at your surgery site with a special soap that kills germs.  Give you a special gown to keep you warm. (Cold raises the risk of infection.)  Wear hair covers, masks, gowns, and gloves during surgery.  Give antibiotic medicine, if prescribed. Not all surgeries need this medicine.  What to bring on the day of surgery  Photo ID and insurance card  Copy of your health care directive, if you have one  Glasses and hearing aids (bring cases)  You can't wear contacts during surgery  Inhaler and eye drops, if you use them (tell us about these when you arrive)  CPAP machine or breathing device, if you use them  A few personal items, if spending the night  If you have . . .  A pacemaker, ICD (cardiac defibrillator), or other implant: Bring the ID card.  An implanted stimulator: Bring the remote control.  A legal guardian: Bring a copy of the certified (court-stamped) guardianship  papers.  Please remove any jewelry, including body piercings. Leave jewelry and other valuables at home.  If you're going home the day of surgery  You must have a responsible adult drive you home. They should stay with you overnight as well.  If you don't have someone to stay with you, and you aren't safe to go home alone, we may keep you overnight. Insurance often won't pay for this.  After surgery  If it's hard to control your pain or you need more pain medicine, please call your surgeon's office.  Questions?   If you have any questions for your care team, list them here:   ____________________________________________________________________________________________________________________________________________________________________________________________________________________________________________________________  For informational purposes only. Not to replace the advice of your health care provider. Copyright   2003, 2019 [employer]. All rights reserved. Clinically reviewed by Dony Dick MD. [employer]works [id number] - REV 08/24.

## 2025-03-19 NOTE — PROGRESS NOTES
Preoperative Evaluation  [address] [address]  Phone: [phone]  Primary Provider: Pal Wilks MD  Pre-op Performing Provider: PRASHANTH Foster CNP  Mar 19, 2025   6}      3/18/2025   Surgical Information   What procedure is being done? Plantar fasciitis release   Facility or Hospital where procedure/surgery will be performed: [facility]   Who is doing the procedure / surgery? Dr. Courtney Barba   Date of surgery / procedure: March 24,2025   Time of surgery / procedure: 10:15   Where do you plan to recover after surgery? at home with family     Fax number for surgical facility: [phone] [facility] [place]     Assessment & Plan     The proposed surgical procedure is considered INTERMEDIATE risk.    Preop general physical exam  - Preoperative instructions and medication hold times reviewed with patient.  - Cleared for planned procedure  - Basic metabolic panel  (Ca, Cl, CO2, Creat, Gluc, K, Na, BUN)  - CBC with platelets    Plantar fasciitis of right foot  - Ongoing problem x 2 years. Cleared for planned procedure.    Impaired fasting blood glucose  - A1C ordered.     Hypercholesteremia  - Tolerating simvastatin. Continue as usual.    Attention deficit hyperactivity disorder (ADHD), predominantly inattentive type  - Advised to hold stimulants day of surgery.    Inappropriate sinus tachycardia  - Cardiac workup reviewed without significant findings. Not having palpitations recently.      - No identified additional risk factors other than previously addressed    Antiplatelet or Anticoagulation Medication Instructions   - We reviewed the medication list and the patient is not on an antiplatelet or anticoagulation medications.    Additional Medication Instructions   - Psychostimulants: Hold the day of surgery   - tadalafil: DO NOT TAKE for 3 days (72 hours) prior to surgery.    Recommendation  Approval  given to proceed with proposed procedure, without further diagnostic evaluation.    Subjective   Al is a 59 year old, presenting for the following:  Pre-Op Exam          3/19/2025     9:12 AM   Additional Questions   Roomed by kali         3/19/2025     9:12 AM   Patient Reported Additional Medications   Patient reports taking the following new medications no     HPI: Plantar fasciitis x 2 years in right foot. Conservative measures have failed.           3/18/2025   Pre-Op Questionnaire   Have you ever had a heart attack or stroke? No   Have you ever had surgery on your heart or blood vessels, such as a stent placement, a coronary artery bypass, or surgery on an artery in your head, neck, heart, or legs? No   Do you have chest pain with activity? No   Do you have a history of heart failure? No   Do you currently have a cold, bronchitis or symptoms of other infection? No   Do you have a cough, shortness of breath, or wheezing? No   Do you or anyone in your family have previous history of blood clots? No   Do you or does anyone in your family have a serious bleeding problem such as prolonged bleeding following surgeries or cuts? No   Have you ever had problems with anemia or been told to take iron pills? No   Have you had any abnormal blood loss such as black, tarry or bloody stools? No   Have you ever had a blood transfusion? No   Are you willing to have a blood transfusion if it is medically needed before, during, or after your surgery? Yes   Have you or any of your relatives ever had problems with anesthesia? No   Do you have sleep apnea, excessive snoring or daytime drowsiness? No   Do you have any artifical heart valves or other implanted medical devices like a pacemaker, defibrillator, or continuous glucose monitor? No   Do you have artificial joints? (!) YES- replaced left knee   Are you allergic to latex? No     Health Care Directive  Patient has a Health Care Directive on file    Preoperative Review of     reviewed - controlled substances reflected in medication list.    Patient Active Problem List    Diagnosis Date Noted    Family history of prostate cancer 07/16/2024     Priority: Medium    Tinnitus, bilateral 09/13/2023     Priority: Medium    Attention deficit hyperactivity disorder (ADHD), predominantly inattentive type 03/16/2023     Priority: Medium    Erectile dysfunction due to diseases classified elsewhere 02/10/2022     Priority: Medium    Need for zoster vaccination 02/10/2022     Priority: Medium    S/P total knee arthroplasty, left 03/12/2021     Priority: Medium    Inappropriate sinus tachycardia 03/04/2021     Priority: Medium    IFG (impaired fasting glucose) 10/20/2020     Priority: Medium    Other neutropenia 02/06/2020     Priority: Medium    Hypercholesteremia 11/13/2019     Priority: Medium    Major depression, chronic      Priority: Medium    Generalized anxiety disorder 01/01/1990     Priority: Medium      Past Medical History:   Diagnosis Date    Attention deficit hyperactivity disorder (ADHD), predominantly inattentive type 03/16/2023    Depressive disorder 2001    Erectile dysfunction due to diseases classified elsewhere 02/10/2022    Family history of prostate cancer 07/16/2024    Generalized anxiety disorder 1990    Inappropriate sinus tachycardia 03/04/2021    Major depression, chronic     Mild hyperlipidemia     Osteoarthritis of left knee     Other neutropenia 02/06/2020    S/P total knee arthroplasty, left 03/12/2021    Spontaneous pneumothorax 10/2010    Right side    Tinnitus, bilateral 09/13/2023     Past Surgical History:   Procedure Laterality Date    APPENDECTOMY OPEN N/A     ARTHROPLASTY KNEE Left 03/12/2021    Procedure: ARTHROPLASTY, KNEE, TOTAL LEFT;  Surgeon: Ton Jackson MD;  Location: [facility] OR    ARTHROSCOPY KNEE Left 03/12/2021    Procedure: ARTHROSCOPY, KNEE LEFT;  Surgeon: Ton Jackson MD;  Location: [facility] OR    AS TOTAL KNEE ARTHROPLASTY Left 03/12/2021  "   COLONOSCOPY  2017    HERNIA REPAIR Left     KNEE SURGERY Left 2003    ACL repair and partioal lateral manisectomy    LUNG SURGERY      ORTHOPEDIC SURGERY  2003    ACL repair left    AK HAND/FINGER SURGERY UNLISTED      THORACOSCOPY Right 10/19/2010    for pneumothorax. upper lobe wedge resection     Current Outpatient Medications   Medication Sig Dispense Refill    amphetamine-dextroamphetamine (ADDERALL XR) 20 MG 24 hr capsule Take 1 capsule (20 mg) by mouth daily. 30 capsule 0    amphetamine-dextroamphetamine (ADDERALL) 10 MG tablet One tablet in the afternoon daily 30 tablet 0    FLUoxetine (PROZAC) 20 MG capsule Take 1 capsule (20 mg) by mouth daily. 90 capsule 1    simvastatin (ZOCOR) 40 MG tablet Take 1 tablet (40 mg) by mouth at bedtime. 90 tablet 1    tadalafil (CIALIS) 10 MG tablet Take 1 tablet (10 mg) by mouth daily as needed (for elerectile dysfunction) 30 tablet 3     No Known Allergies     Social History     Tobacco Use    Smoking status: Never    Smokeless tobacco: Never    Tobacco comments:     na   Substance Use Topics    Alcohol use: Yes     Comment: infrequent     History   Drug Use Unknown       Review of Systems   Constitutional:  Negative for chills, fatigue and fever.   HENT:  Negative for congestion, rhinorrhea, sinus pressure, sinus pain and sore throat.    Respiratory:  Negative for cough, chest tightness, shortness of breath and wheezing.    Cardiovascular:  Negative for chest pain, palpitations and leg swelling.   Gastrointestinal:  Negative for abdominal pain, constipation, diarrhea, nausea and vomiting.   Genitourinary:  Negative for dysuria, frequency and urgency.   Skin:  Negative for rash and wound.   Neurological:  Negative for dizziness, numbness and headaches.     Objective    /75 (BP Location: Left arm, Patient Position: Sitting, Cuff Size: Adult Large)   Pulse 75   Temp 97.5  F (36.4  C) (Oral)   Resp 12   Ht 1.727 m (5' 7.99\")   Wt 78.9 kg (174 lb)   SpO2 99%   " "BMI 26.46 kg/m     Estimated body mass index is 26.46 kg/m  as calculated from the following:    Height as of this encounter: 1.727 m (5' 7.99\").    Weight as of this encounter: 78.9 kg (174 lb).    Physical Exam  GENERAL: alert and no distress  EYES: Eyes grossly normal to inspection, PERRL and conjunctivae and sclerae normal  HENT: ear canals and TM's normal, nose and mouth without ulcers or lesions  NECK: no adenopathy, no asymmetry, masses, or scars  RESP: lungs clear to auscultation - no rales, rhonchi or wheezes  CV: regular rate and rhythm, normal S1 S2, no S3 or S4, no murmur, click or rub, no peripheral edema  ABDOMEN: soft, nontender, no hepatosplenomegaly, no masses and bowel sounds normal  MS: no gross musculoskeletal defects noted, no edema  SKIN: no suspicious lesions or rashes  NEURO: Normal strength and tone, mentation intact and speech normal  PSYCH: mentation appears normal, affect normal/bright     Diagnostics  Office Visit on 03/19/2025   Component Date Value Ref Range Status    Sodium 03/19/2025 139  135 - 145 mmol/L Final    Potassium 03/19/2025 4.5  3.4 - 5.3 mmol/L Final    Chloride 03/19/2025 100  98 - 107 mmol/L Final    Carbon Dioxide (CO2) 03/19/2025 29  22 - 29 mmol/L Final    Anion Gap 03/19/2025 10  7 - 15 mmol/L Final    Urea Nitrogen 03/19/2025 13.5  8.0 - 23.0 mg/dL Final    Creatinine 03/19/2025 1.05  0.67 - 1.17 mg/dL Final    GFR Estimate 03/19/2025 82  >60 mL/min/1.73m2 Final    eGFR calculated using 2021 CKD-EPI equation.    Calcium 03/19/2025 9.7  8.8 - 10.4 mg/dL Final    Glucose 03/19/2025 146 (H)  70 - 99 mg/dL Final    WBC Count 03/19/2025 4.2  4.0 - 11.0 10e3/uL Final    RBC Count 03/19/2025 5.03  4.40 - 5.90 10e6/uL Final    Hemoglobin 03/19/2025 14.6  13.3 - 17.7 g/dL Final    Hematocrit 03/19/2025 43.9  40.0 - 53.0 % Final    MCV 03/19/2025 87  78 - 100 fL Final    MCH 03/19/2025 29.0  26.5 - 33.0 pg Final    MCHC 03/19/2025 33.3  31.5 - 36.5 g/dL Final    RDW " 03/19/2025 12.6  10.0 - 15.0 % Final    Platelet Count 03/19/2025 272  150 - 450 10e3/uL Final    Estimated Average Glucose 03/19/2025 123 (H)  <117 mg/dL Final    Hemoglobin A1C 03/19/2025 5.9 (H)  0.0 - 5.6 % Final    Normal <5.7%   Prediabetes 5.7-6.4%    Diabetes 6.5% or higher     Note: Adopted from ADA consensus guidelines.     No EKG required, no history of coronary heart disease, significant arrhythmia, peripheral arterial disease or other structural heart disease.    Revised Cardiac Risk Index (RCRI)  The patient has the following serious cardiovascular risks for perioperative complications:   - No serious cardiac risks = 0 points   RCRI Interpretation: 0 points: Class I (very low risk - 0.4% complication rate)     Signed Electronically by: PRASHANTH Foster CNP  A copy of this evaluation report is provided to the requesting physician.

## 2025-03-19 NOTE — LETTER
3/19/2025      Lasha Sims  [address] [address] [place] [address]        Preoperative Evaluation  [address] [address]  Phone: [phone]  Primary Provider: Pal Wilks MD  Pre-op Performing Provider: PRASHANTH Foster CNP  Mar 19, 2025   6}      3/18/2025   Surgical Information   What procedure is being done? Plantar fasciitis release   Facility or Hospital where procedure/surgery will be performed: [facility]   Who is doing the procedure / surgery? Dr. Courtney Yang Barba   Date of surgery / procedure: March 24,2025   Time of surgery / procedure: 10:15   Where do you plan to recover after surgery? at home with family     Fax number for surgical facility: [phone] [facility]     Assessment & Plan    The proposed surgical procedure is considered INTERMEDIATE risk.    Preop general physical exam  - Preoperative instructions and medication hold times reviewed with patient.  - Cleared for planned procedure  - Basic metabolic panel  (Ca, Cl, CO2, Creat, Gluc, K, Na, BUN)  - CBC with platelets    Plantar fasciitis of right foot  - Ongoing problem x 2 years. Cleared for planned procedure.    Impaired fasting blood glucose  - A1C ordered.     Hypercholesteremia  - Tolerating simvastatin. Continue as usual.    Attention deficit hyperactivity disorder (ADHD), predominantly inattentive type  - Advised to hold stimulants day of surgery.    Inappropriate sinus tachycardia  - Cardiac workup reviewed without significant findings. Not having palpitations recently.      - No identified additional risk factors other than previously addressed    Antiplatelet or Anticoagulation Medication Instructions   - We reviewed the medication list and the patient is not on an antiplatelet or anticoagulation medications.    Additional Medication Instructions   - Psychostimulants: Hold the day of surgery   - tadalafil: DO NOT  TAKE for 3 days (72 hours) prior to surgery.    Recommendation  Approval given to proceed with proposed procedure, without further diagnostic evaluation.    Subjective  Al is a 59 year old, presenting for the following:  Pre-Op Exam          3/19/2025     9:12 AM   Additional Questions   Roomed by kali         3/19/2025     9:12 AM   Patient Reported Additional Medications   Patient reports taking the following new medications no     HPI: Plantar fasciitis x 2 years in right foot. Conservative measures have failed.           3/18/2025   Pre-Op Questionnaire   Have you ever had a heart attack or stroke? No   Have you ever had surgery on your heart or blood vessels, such as a stent placement, a coronary artery bypass, or surgery on an artery in your head, neck, heart, or legs? No   Do you have chest pain with activity? No   Do you have a history of heart failure? No   Do you currently have a cold, bronchitis or symptoms of other infection? No   Do you have a cough, shortness of breath, or wheezing? No   Do you or anyone in your family have previous history of blood clots? No   Do you or does anyone in your family have a serious bleeding problem such as prolonged bleeding following surgeries or cuts? No   Have you ever had problems with anemia or been told to take iron pills? No   Have you had any abnormal blood loss such as black, tarry or bloody stools? No   Have you ever had a blood transfusion? No   Are you willing to have a blood transfusion if it is medically needed before, during, or after your surgery? Yes   Have you or any of your relatives ever had problems with anesthesia? No   Do you have sleep apnea, excessive snoring or daytime drowsiness? No   Do you have any artifical heart valves or other implanted medical devices like a pacemaker, defibrillator, or continuous glucose monitor? No   Do you have artificial joints? (!) YES- replaced left knee   Are you allergic to latex? No     Health Care  Directive  Patient has a Health Care Directive on file    Preoperative Review of    reviewed - controlled substances reflected in medication list.    Patient Active Problem List    Diagnosis Date Noted     Family history of prostate cancer 07/16/2024     Priority: Medium     Tinnitus, bilateral 09/13/2023     Priority: Medium     Attention deficit hyperactivity disorder (ADHD), predominantly inattentive type 03/16/2023     Priority: Medium     Erectile dysfunction due to diseases classified elsewhere 02/10/2022     Priority: Medium     Need for zoster vaccination 02/10/2022     Priority: Medium     S/P total knee arthroplasty, left 03/12/2021     Priority: Medium     Inappropriate sinus tachycardia 03/04/2021     Priority: Medium     IFG (impaired fasting glucose) 10/20/2020     Priority: Medium     Other neutropenia 02/06/2020     Priority: Medium     Hypercholesteremia 11/13/2019     Priority: Medium     Major depression, chronic      Priority: Medium     Generalized anxiety disorder 01/01/1990     Priority: Medium      Past Medical History:   Diagnosis Date     Attention deficit hyperactivity disorder (ADHD), predominantly inattentive type 03/16/2023     Depressive disorder 2001     Erectile dysfunction due to diseases classified elsewhere 02/10/2022     Family history of prostate cancer 07/16/2024     Generalized anxiety disorder 1990     Inappropriate sinus tachycardia 03/04/2021     Major depression, chronic      Mild hyperlipidemia      Osteoarthritis of left knee      Other neutropenia 02/06/2020     S/P total knee arthroplasty, left 03/12/2021     Spontaneous pneumothorax 10/2010    Right side     Tinnitus, bilateral 09/13/2023     Past Surgical History:   Procedure Laterality Date     APPENDECTOMY OPEN N/A      ARTHROPLASTY KNEE Left 03/12/2021    Procedure: ARTHROPLASTY, KNEE, TOTAL LEFT;  Surgeon: Ton Jackson MD;  [facility]     ARTHROSCOPY KNEE Left 03/12/2021    Procedure:  ARTHROSCOPY, KNEE LEFT;  Surgeon: Ton Jackson MD;  [facility]     AS TOTAL KNEE ARTHROPLASTY Left 03/12/2021     COLONOSCOPY  2017     HERNIA REPAIR Left      KNEE SURGERY Left 2003    ACL repair and partioal lateral manisectomy     LUNG SURGERY       ORTHOPEDIC SURGERY  2003    ACL repair left     [place] HAND/FINGER SURGERY UNLISTED       THORACOSCOPY Right 10/19/2010    for pneumothorax. upper lobe wedge resection     Current Outpatient Medications   Medication Sig Dispense Refill     amphetamine-dextroamphetamine (ADDERALL XR) 20 MG 24 hr capsule Take 1 capsule (20 mg) by mouth daily. 30 capsule 0     amphetamine-dextroamphetamine (ADDERALL) 10 MG tablet One tablet in the afternoon daily 30 tablet 0     FLUoxetine (PROZAC) 20 MG capsule Take 1 capsule (20 mg) by mouth daily. 90 capsule 1     simvastatin (ZOCOR) 40 MG tablet Take 1 tablet (40 mg) by mouth at bedtime. 90 tablet 1     tadalafil (CIALIS) 10 MG tablet Take 1 tablet (10 mg) by mouth daily as needed (for elerectile dysfunction) 30 tablet 3     No Known Allergies     Social History     Tobacco Use     Smoking status: Never     Smokeless tobacco: Never     Tobacco comments:     na   Substance Use Topics     Alcohol use: Yes     Comment: infrequent     History   Drug Use Unknown       Review of Systems   Constitutional:  Negative for chills, fatigue and fever.   HENT:  Negative for congestion, rhinorrhea, sinus pressure, sinus pain and sore throat.    Respiratory:  Negative for cough, chest tightness, shortness of breath and wheezing.    Cardiovascular:  Negative for chest pain, palpitations and leg swelling.   Gastrointestinal:  Negative for abdominal pain, constipation, diarrhea, nausea and vomiting.   Genitourinary:  Negative for dysuria, frequency and urgency.   Skin:  Negative for rash and wound.   Neurological:  Negative for dizziness, numbness and headaches.     Objective   /75 (BP Location: Left arm, Patient Position: Sitting, Cuff  "Size: Adult Large)   Pulse 75   Temp 97.5  F (36.4  C) (Oral)   Resp 12   Ht 1.727 m (5' 7.99\")   Wt 78.9 kg (174 lb)   SpO2 99%   BMI 26.46 kg/m     Estimated body mass index is 26.46 kg/m  as calculated from the following:    Height as of this encounter: 1.727 m (5' 7.99\").    Weight as of this encounter: 78.9 kg (174 lb).    Physical Exam  GENERAL: alert and no distress  EYES: Eyes grossly normal to inspection, PERRL and conjunctivae and sclerae normal  HENT: ear canals and TM's normal, nose and mouth without ulcers or lesions  NECK: no adenopathy, no asymmetry, masses, or scars  RESP: lungs clear to auscultation - no rales, rhonchi or wheezes  CV: regular rate and rhythm, normal S1 S2, no S3 or S4, no murmur, click or rub, no peripheral edema  ABDOMEN: soft, nontender, no hepatosplenomegaly, no masses and bowel sounds normal  MS: no gross musculoskeletal defects noted, no edema  SKIN: no suspicious lesions or rashes  NEURO: Normal strength and tone, mentation intact and speech normal  PSYCH: mentation appears normal, affect normal/bright    Recent Labs   Lab Test 08/14/24  0706   HGB 13.2*         POTASSIUM 3.9   CR 1.16   A1C 5.9*      Diagnostics  Labs pending at this time.  Results will be reviewed when available.   No EKG required, no history of coronary heart disease, significant arrhythmia, peripheral arterial disease or other structural heart disease.    Revised Cardiac Risk Index (RCRI)  The patient has the following serious cardiovascular risks for perioperative complications:   - No serious cardiac risks = 0 points   RCRI Interpretation: 0 points: Class I (very low risk - 0.4% complication rate)     Signed Electronically by: PRASHANTH Foster CNP  A copy of this evaluation report is provided to the requesting physician.        Sincerely,        PRASHANTH Foster CNP    Electronically signed  "

## 2025-05-27 ENCOUNTER — MYC REFILL (OUTPATIENT)
Dept: FAMILY MEDICINE | Facility: CLINIC | Age: 60
End: 2025-05-27
Payer: COMMERCIAL

## 2025-05-27 DIAGNOSIS — F90.0 ATTENTION DEFICIT HYPERACTIVITY DISORDER (ADHD), PREDOMINANTLY INATTENTIVE TYPE: ICD-10-CM

## 2025-05-28 RX ORDER — DEXTROAMPHETAMINE SACCHARATE, AMPHETAMINE ASPARTATE MONOHYDRATE, DEXTROAMPHETAMINE SULFATE AND AMPHETAMINE SULFATE 5; 5; 5; 5 MG/1; MG/1; MG/1; MG/1
20 CAPSULE, EXTENDED RELEASE ORAL DAILY
Qty: 30 CAPSULE | Refills: 0 | Status: SHIPPED | OUTPATIENT
Start: 2025-05-28

## 2025-06-06 ENCOUNTER — MYC REFILL (OUTPATIENT)
Dept: FAMILY MEDICINE | Facility: CLINIC | Age: 60
End: 2025-06-06
Payer: COMMERCIAL

## 2025-06-06 DIAGNOSIS — F32.9 MAJOR DEPRESSION, CHRONIC: ICD-10-CM

## 2025-06-06 DIAGNOSIS — E78.00 HYPERCHOLESTEREMIA: ICD-10-CM

## 2025-06-09 RX ORDER — SIMVASTATIN 40 MG
40 TABLET ORAL AT BEDTIME
Qty: 90 TABLET | Refills: 1 | Status: SHIPPED | OUTPATIENT
Start: 2025-06-09

## 2025-06-24 ENCOUNTER — DOCUMENTATION ONLY (OUTPATIENT)
Dept: OTHER | Facility: CLINIC | Age: 60
End: 2025-06-24
Payer: COMMERCIAL

## 2025-07-29 ENCOUNTER — OFFICE VISIT (OUTPATIENT)
Dept: FAMILY MEDICINE | Facility: CLINIC | Age: 60
End: 2025-07-29
Attending: INTERNAL MEDICINE
Payer: COMMERCIAL

## 2025-07-29 VITALS
TEMPERATURE: 97.8 F | DIASTOLIC BLOOD PRESSURE: 73 MMHG | SYSTOLIC BLOOD PRESSURE: 121 MMHG | OXYGEN SATURATION: 97 % | HEART RATE: 80 BPM | BODY MASS INDEX: 25.92 KG/M2 | HEIGHT: 69 IN | RESPIRATION RATE: 14 BRPM | WEIGHT: 175 LBS

## 2025-07-29 DIAGNOSIS — F32.9 MAJOR DEPRESSION, CHRONIC: ICD-10-CM

## 2025-07-29 DIAGNOSIS — E78.00 HYPERCHOLESTEREMIA: ICD-10-CM

## 2025-07-29 DIAGNOSIS — R73.01 IFG (IMPAIRED FASTING GLUCOSE): ICD-10-CM

## 2025-07-29 DIAGNOSIS — F90.0 ATTENTION DEFICIT HYPERACTIVITY DISORDER (ADHD), PREDOMINANTLY INATTENTIVE TYPE: ICD-10-CM

## 2025-07-29 DIAGNOSIS — Z12.11 SCREEN FOR COLON CANCER: ICD-10-CM

## 2025-07-29 DIAGNOSIS — Z12.5 SCREENING FOR PROSTATE CANCER: ICD-10-CM

## 2025-07-29 DIAGNOSIS — F41.1 GENERALIZED ANXIETY DISORDER: ICD-10-CM

## 2025-07-29 DIAGNOSIS — Z00.00 ANNUAL PHYSICAL EXAM: Primary | ICD-10-CM

## 2025-07-29 PROBLEM — D70.8 OTHER NEUTROPENIA: Status: RESOLVED | Noted: 2020-02-06 | Resolved: 2025-07-29

## 2025-07-29 PROCEDURE — 1126F AMNT PAIN NOTED NONE PRSNT: CPT | Performed by: INTERNAL MEDICINE

## 2025-07-29 PROCEDURE — 3074F SYST BP LT 130 MM HG: CPT | Performed by: INTERNAL MEDICINE

## 2025-07-29 PROCEDURE — 3078F DIAST BP <80 MM HG: CPT | Performed by: INTERNAL MEDICINE

## 2025-07-29 PROCEDURE — 99396 PREV VISIT EST AGE 40-64: CPT | Performed by: INTERNAL MEDICINE

## 2025-07-29 PROCEDURE — 99213 OFFICE O/P EST LOW 20 MIN: CPT | Mod: 25 | Performed by: INTERNAL MEDICINE

## 2025-07-29 RX ORDER — SIMVASTATIN 40 MG
40 TABLET ORAL AT BEDTIME
Qty: 90 TABLET | Refills: 3 | Status: SHIPPED | OUTPATIENT
Start: 2025-07-29

## 2025-07-29 SDOH — HEALTH STABILITY: PHYSICAL HEALTH: ON AVERAGE, HOW MANY DAYS PER WEEK DO YOU ENGAGE IN MODERATE TO STRENUOUS EXERCISE (LIKE A BRISK WALK)?: 5 DAYS

## 2025-07-29 SDOH — HEALTH STABILITY: PHYSICAL HEALTH: ON AVERAGE, HOW MANY MINUTES DO YOU ENGAGE IN EXERCISE AT THIS LEVEL?: 40 MIN

## 2025-07-29 ASSESSMENT — PAIN SCALES - GENERAL: PAINLEVEL_OUTOF10: NO PAIN (0)

## 2025-07-29 ASSESSMENT — SOCIAL DETERMINANTS OF HEALTH (SDOH): HOW OFTEN DO YOU GET TOGETHER WITH FRIENDS OR RELATIVES?: ONCE A WEEK

## 2025-07-29 ASSESSMENT — PATIENT HEALTH QUESTIONNAIRE - PHQ9
10. IF YOU CHECKED OFF ANY PROBLEMS, HOW DIFFICULT HAVE THESE PROBLEMS MADE IT FOR YOU TO DO YOUR WORK, TAKE CARE OF THINGS AT HOME, OR GET ALONG WITH OTHER PEOPLE: NOT DIFFICULT AT ALL
SUM OF ALL RESPONSES TO PHQ QUESTIONS 1-9: 1
SUM OF ALL RESPONSES TO PHQ QUESTIONS 1-9: 1

## 2025-07-29 NOTE — PROGRESS NOTES
"Preventive Care Visit  [facility]  Pal Wilks MD, Internal Medicine  Jul 29, 2025      Assessment & Plan     1.  Annual physical examination completed.  Examination is good.  2.  Hypercholesterolemia being treated with simvastatin.  Will be checking fasting lipids and get back to her with results.  3.  ADHD stable.  Patient to continue present dose of Adderall.  4.  Impaired fasting glucose.  I will be checking A1c.  5.  Major depression disorder chronic currently in remission with fluoxetine 20 mg a day.  6.  Generalized anxiety disorder well-controlled with fluoxetine 20 mg a day.  Will continue same.  7.  Screening for colon cancer.  Patient due for colonoscopy exam.  Orders placed.  8.  Screening for prostate cancer by checking PSA.    Patient will have CBC, CMP, PSA, lipids and A1c done fasting.  I will get back to him with results and recommendation.      Patient has been advised of split billing requirements and indicates understanding: Yes    BMI  Estimated body mass index is 26.22 kg/m  as calculated from the following:    Height as of this encounter: 1.74 m (5' 8.5\").    Weight as of this encounter: 79.4 kg (175 lb).       Counseling  Appropriate preventive services were addressed with this patient via screening, questionnaire, or discussion as appropriate for fall prevention, nutrition, physical activity, Tobacco-use cessation, social engagement, weight loss and cognition.  Checklist reviewing preventive services available has been given to the patient.  Reviewed patient's diet, addressing concerns and/or questions.   Reviewed preventive health counseling, as reflected in patient instructions       Consider AAA screening for ages 65-75 and > 100 cig smoking history or family history of AAA       Regular exercise       Healthy diet/nutrition       Vision screening      Subjective   Al is a 60 year old, presenting for the following:  Physical        7/29/2025     6:56 AM "   Additional Questions   Roomed by Cierra   Accompanied by self         7/29/2025     6:56 AM   Patient Reported Additional Medications   Patient reports taking the following new medications no          HPI    68-year-old gentleman comes in for annual physical examination.  he has history of hypercholesterolemia, ADHD and impaired fasting glucose which have been in control.  He has a history of depression and anxiety which is in remission with fluoxetine.  Overall he feels good.  He exercises at least 4-5 times a week.    In March he had plantar fascial release surgery and currently is being treated for stress fracture of right fifth metatarsal.  He sees a podiatrist at [facility].      Advance Care Planning    Document on file is a Health Care Directive or POLST.        7/29/2025   General Health   How would you rate your overall physical health? Good   Feel stress (tense, anxious, or unable to sleep) Only a little   (!) STRESS CONCERN      7/29/2025   Nutrition   Three or more servings of calcium each day? Yes   Diet: Regular (no restrictions)   How many servings of fruit and vegetables per day? (!) 2-3   How many sweetened beverages each day? 0-1         7/29/2025   Exercise   Days per week of moderate/strenous exercise 5 days   Average minutes spent exercising at this level 40 min         7/29/2025   Social Factors   Frequency of gathering with friends or relatives Once a week   Worry food won't last until get money to buy more No   Food not last or not have enough money for food? No   Do you have housing? (Housing is defined as stable permanent housing and does not include staying outside in a car, in a tent, in an abandoned building, in an overnight shelter, or couch-surfing.) Yes   Are you worried about losing your housing? No   Lack of transportation? No   Unable to get utilities (heat,electricity)? No         7/29/2025   Fall Risk   Fallen 2 or more times in the past year? No   Trouble with walking or  balance? No          2025   Dental   Dentist two times every year? Yes       Today's PHQ-9 Score:       2025     6:51 AM   PHQ-9 SCORE   PHQ-9 Total Score MyChart 1 (Minimal depression)   PHQ-9 Total Score 1        Patient-reported         2025   Substance Use   Alcohol more than 3/day or more than 7/wk No   Do you use any other substances recreationally? No     Social History     Tobacco Use    Smoking status: Never     Passive exposure: Never    Smokeless tobacco: Never    Tobacco comments:     na   Vaping Use    Vaping status: Never Used   Substance Use Topics    Alcohol use: Yes     Comment: infrequent    Drug use: Not Currently     Types: Marijuana           2025   STI Screening   New sexual partner(s) since last STI/HIV test? No   Last PSA:   PSA   Date Value Ref Range Status   10/20/2020 1.04 0 - 4 ug/L Final     Comment:     Assay Method:  Chemiluminescence using Siemens Vista analyzer     Prostate Specific Antigen Screen   Date Value Ref Range Status   2024 1.85 0.00 - 3.50 ng/mL Final     PSA Tumor Marker   Date Value Ref Range Status   02/10/2022 1.44 0.00 - 4.00 ug/L Final     ASCVD Risk   The 10-year ASCVD risk score (Pj WHITE, et al., 2019) is: 8.5%    Values used to calculate the score:      Age: 60 years      Sex: Male      Is Non- : No      Diabetic: No      Tobacco smoker: No      Systolic Blood Pressure: 121 mmHg      Is BP treated: No      HDL Cholesterol: 57 mg/dL      Total Cholesterol: 241 mg/dL    Fracture Risk Assessment Tool  Link to Frax Calculator  Use the information below to complete the Frax calculator  : 1965  Sex: male  Weight (kg): 79.4 kg (actual weight)  Height (cm): 174 cm  Previous Fragility Fracture:  No  History of parent with fractured hip:  No  Current Smoking:  No  Patient has been on glucocorticoids for more than 3 months (5mg/day or more): No  Rheumatoid Arthritis on Problem List:  No  Secondary  Osteoporosis on Problem List:  No  Consumes 3 or more units of alcohol per day: No  Femoral Neck BMD (g/cm2)           Reviewed and updated as needed this visit by Provider                    Past Medical History:   Diagnosis Date    Attention deficit hyperactivity disorder (ADHD), predominantly inattentive type 03/16/2023    Depressive disorder 2001    Erectile dysfunction due to diseases classified elsewhere 02/10/2022    Family history of prostate cancer 07/16/2024    Generalized anxiety disorder 1990    Inappropriate sinus tachycardia 03/04/2021    Major depression, chronic     Mild hyperlipidemia     Osteoarthritis of left knee     Other neutropenia 02/06/2020    S/P total knee arthroplasty, left 03/12/2021    Spontaneous pneumothorax 10/2010    Right side    Tinnitus, bilateral 09/13/2023     Past Surgical History:   Procedure Laterality Date    APPENDECTOMY OPEN N/A     ARTHROPLASTY KNEE Left 03/12/2021    Procedure: ARTHROPLASTY, KNEE, TOTAL LEFT;  Surgeon: Ton Jackson MD;  [facility]    ARTHROSCOPY KNEE Left 03/12/2021    Procedure: ARTHROSCOPY, KNEE LEFT;  Surgeon: Ton Jackson MD;  [facility]    AS TOTAL KNEE ARTHROPLASTY Left 03/12/2021    COLONOSCOPY  2017    HERNIA REPAIR Left     KNEE SURGERY Left 2003    ACL repair and partioal lateral manisectomy    LUNG SURGERY      ORTHOPEDIC SURGERY  2003    ACL repair left    [place] HAND/FINGER SURGERY UNLISTED      THORACOSCOPY Right 10/19/2010    for pneumothorax. upper lobe wedge resection     BP Readings from Last 3 Encounters:   07/29/25 121/73   03/19/25 113/75   07/16/24 127/73    Wt Readings from Last 3 Encounters:   07/29/25 79.4 kg (175 lb)   03/19/25 78.9 kg (174 lb)   07/16/24 78.4 kg (172 lb 14.4 oz)                  Patient Active Problem List   Diagnosis    Major depression, chronic    Generalized anxiety disorder    Hypercholesteremia    IFG (impaired fasting glucose)    Inappropriate sinus tachycardia    S/P total knee  arthroplasty, left    Erectile dysfunction due to diseases classified elsewhere    Attention deficit hyperactivity disorder (ADHD), predominantly inattentive type    Tinnitus, bilateral    Family history of prostate cancer     Past Surgical History:   Procedure Laterality Date    APPENDECTOMY OPEN N/A     ARTHROPLASTY KNEE Left 03/12/2021    Procedure: ARTHROPLASTY, KNEE, TOTAL LEFT;  Surgeon: Ton Jackson MD;  [facility]    ARTHROSCOPY KNEE Left 03/12/2021    Procedure: ARTHROSCOPY, KNEE LEFT;  Surgeon: Ton Jackson MD;  [facility]    AS TOTAL KNEE ARTHROPLASTY Left 03/12/2021    COLONOSCOPY  2017    HERNIA REPAIR Left     KNEE SURGERY Left 2003    ACL repair and partioal lateral manisectomy    LUNG SURGERY      ORTHOPEDIC SURGERY  2003    ACL repair left    [place] HAND/FINGER SURGERY UNLISTED      THORACOSCOPY Right 10/19/2010    for pneumothorax. upper lobe wedge resection       Social History     Tobacco Use    Smoking status: Never     Passive exposure: Never    Smokeless tobacco: Never    Tobacco comments:     na   Substance Use Topics    Alcohol use: Yes     Comment: infrequent     Family History   Problem Relation Age of Onset    Hyperlipidemia Mother     Melanoma Mother 85        eye    Multiple myeloma Father 60    Other Cancer Father         multiple myloma    Mental Illness Sister     Mental Illness Sister     Prostate Cancer Brother 50    Brain Cancer Paternal Grandfather 60    Depression Daughter          Current Outpatient Medications   Medication Sig Dispense Refill    amphetamine-dextroamphetamine (ADDERALL XR) 20 MG 24 hr capsule Take 1 capsule (20 mg) by mouth daily. 30 capsule 0    amphetamine-dextroamphetamine (ADDERALL) 10 MG tablet One tablet in the afternoon daily 30 tablet 0    FLUoxetine (PROZAC) 20 MG capsule Take 1 capsule (20 mg) by mouth daily. 90 capsule 0    simvastatin (ZOCOR) 40 MG tablet Take 1 tablet (40 mg) by mouth at bedtime. 90 tablet 1    tadalafil (CIALIS)  "10 MG tablet Take 1 tablet (10 mg) by mouth daily as needed (for elerectile dysfunction) 30 tablet 3     No Known Allergies      Review of Systems  Constitutional, HEENT, cardiovascular, pulmonary, GI, , musculoskeletal, neuro, skin, endocrine and psych systems are negative, except as otherwise noted.     Objective    Exam  /73 (BP Location: Right arm, Patient Position: Sitting, Cuff Size: Adult Regular)   Pulse 80   Temp 97.8  F (36.6  C) (Temporal)   Resp 14   Ht 1.74 m (5' 8.5\")   Wt 79.4 kg (175 lb)   SpO2 97%   BMI 26.22 kg/m     Estimated body mass index is 26.22 kg/m  as calculated from the following:    Height as of this encounter: 1.74 m (5' 8.5\").    Weight as of this encounter: 79.4 kg (175 lb).    Physical Exam  GENERAL: alert and no distress  EYES: Eyes grossly normal to inspection, PERRL and conjunctivae and sclerae normal  HENT: ear canals and TM's normal, nose and mouth without ulcers or lesions  NECK: no adenopathy, no asymmetry, masses, or scars  RESP: lungs clear to auscultation - no rales, rhonchi or wheezes  CV: regular rate and rhythm, normal S1 S2, no S3 or S4, no murmur, click or rub, no peripheral edema  ABDOMEN: soft, nontender, no hepatosplenomegaly, no masses and bowel sounds normal   (male): normal male genitalia without lesions or urethral discharge, no hernia  MS: no gross musculoskeletal defects noted, no edema  SKIN: no suspicious lesions or rashes  NEURO: Normal strength and tone, mentation intact and speech normal  PSYCH: mentation appears normal, affect normal/bright  LYMPH: no cervical, supraclavicular, axillary, or inguinal adenopathy        Signed Electronically by: Pal Wilks MD    "

## 2025-07-31 ENCOUNTER — LAB (OUTPATIENT)
Dept: LAB | Facility: CLINIC | Age: 60
End: 2025-07-31
Payer: COMMERCIAL

## 2025-07-31 DIAGNOSIS — Z00.00 ANNUAL PHYSICAL EXAM: ICD-10-CM

## 2025-07-31 DIAGNOSIS — Z12.5 SCREENING FOR PROSTATE CANCER: ICD-10-CM

## 2025-07-31 DIAGNOSIS — R73.01 IFG (IMPAIRED FASTING GLUCOSE): ICD-10-CM

## 2025-07-31 DIAGNOSIS — E78.00 HYPERCHOLESTEREMIA: ICD-10-CM

## 2025-07-31 LAB
ALBUMIN SERPL BCG-MCNC: 4.5 G/DL (ref 3.5–5.2)
ALP SERPL-CCNC: 66 U/L (ref 40–150)
ALT SERPL W P-5'-P-CCNC: 40 U/L (ref 0–70)
ANION GAP SERPL CALCULATED.3IONS-SCNC: 6 MMOL/L (ref 7–15)
AST SERPL W P-5'-P-CCNC: 19 U/L (ref 0–45)
BILIRUB SERPL-MCNC: 0.5 MG/DL
BUN SERPL-MCNC: 18.4 MG/DL (ref 8–23)
CALCIUM SERPL-MCNC: 9.6 MG/DL (ref 8.8–10.4)
CHLORIDE SERPL-SCNC: 103 MMOL/L (ref 98–107)
CHOLEST SERPL-MCNC: 243 MG/DL
CREAT SERPL-MCNC: 1.15 MG/DL (ref 0.67–1.17)
EGFRCR SERPLBLD CKD-EPI 2021: 73 ML/MIN/1.73M2
ERYTHROCYTE [DISTWIDTH] IN BLOOD BY AUTOMATED COUNT: 12.9 % (ref 10–15)
EST. AVERAGE GLUCOSE BLD GHB EST-MCNC: 120 MG/DL
FASTING STATUS PATIENT QL REPORTED: YES
FASTING STATUS PATIENT QL REPORTED: YES
GLUCOSE SERPL-MCNC: 123 MG/DL (ref 70–99)
HBA1C MFR BLD: 5.8 % (ref 0–5.6)
HCO3 SERPL-SCNC: 30 MMOL/L (ref 22–29)
HCT VFR BLD AUTO: 41.4 % (ref 40–53)
HDLC SERPL-MCNC: 50 MG/DL
HGB BLD-MCNC: 13.8 G/DL (ref 13.3–17.7)
LDLC SERPL CALC-MCNC: 159 MG/DL
MCH RBC QN AUTO: 29 PG (ref 26.5–33)
MCHC RBC AUTO-ENTMCNC: 33.3 G/DL (ref 31.5–36.5)
MCV RBC AUTO: 87 FL (ref 78–100)
NONHDLC SERPL-MCNC: 193 MG/DL
PLATELET # BLD AUTO: 252 10E3/UL (ref 150–450)
POTASSIUM SERPL-SCNC: 4.3 MMOL/L (ref 3.4–5.3)
PROT SERPL-MCNC: 7 G/DL (ref 6.4–8.3)
PSA SERPL DL<=0.01 NG/ML-MCNC: 2.42 NG/ML (ref 0–4.5)
RBC # BLD AUTO: 4.76 10E6/UL (ref 4.4–5.9)
SODIUM SERPL-SCNC: 139 MMOL/L (ref 135–145)
TRIGL SERPL-MCNC: 171 MG/DL
WBC # BLD AUTO: 3.7 10E3/UL (ref 4–11)

## 2025-08-01 ENCOUNTER — RESULTS FOLLOW-UP (OUTPATIENT)
Dept: FAMILY MEDICINE | Facility: CLINIC | Age: 60
End: 2025-08-01
Payer: COMMERCIAL

## 2025-08-01 DIAGNOSIS — E78.00 HYPERCHOLESTEREMIA: Primary | ICD-10-CM

## 2025-08-05 RX ORDER — ROSUVASTATIN CALCIUM 20 MG/1
20 TABLET, COATED ORAL DAILY
Qty: 90 TABLET | Refills: 3 | Status: SHIPPED | OUTPATIENT
Start: 2025-08-05

## 2025-09-01 ENCOUNTER — MYC MEDICAL ADVICE (OUTPATIENT)
Dept: FAMILY MEDICINE | Facility: CLINIC | Age: 60
End: 2025-09-01
Payer: COMMERCIAL

## 2025-09-01 DIAGNOSIS — Z12.11 SCREENING FOR COLON CANCER: Primary | ICD-10-CM

## 2025-09-03 ENCOUNTER — LAB (OUTPATIENT)
Dept: LAB | Facility: CLINIC | Age: 60
End: 2025-09-03
Payer: COMMERCIAL

## 2025-09-03 DIAGNOSIS — E78.00 HYPERCHOLESTEREMIA: ICD-10-CM

## 2025-09-03 LAB
CHOLEST SERPL-MCNC: 181 MG/DL
FASTING STATUS PATIENT QL REPORTED: YES
HDLC SERPL-MCNC: 45 MG/DL
LDLC SERPL CALC-MCNC: 110 MG/DL
NONHDLC SERPL-MCNC: 136 MG/DL
TRIGL SERPL-MCNC: 128 MG/DL

## 2025-09-03 PROCEDURE — 80061 LIPID PANEL: CPT

## 2025-09-03 PROCEDURE — 36415 COLL VENOUS BLD VENIPUNCTURE: CPT

## 2025-09-04 ENCOUNTER — TELEPHONE (OUTPATIENT)
Dept: GASTROENTEROLOGY | Facility: CLINIC | Age: 60
End: 2025-09-04
Payer: COMMERCIAL

## (undated) DEVICE — ADH SKIN CLOSURE PREMIERPRO EXOFIN 1.0ML 3470

## (undated) DEVICE — SOL WATER IRRIG 1000ML BOTTLE 2F7114

## (undated) DEVICE — SPONGE LAP 18X18" X8435

## (undated) DEVICE — GOWN IMPERVIOUS SPECIALTY XLG/XLONG 32474

## (undated) DEVICE — SOL NACL 0.9% IRRIG 1000ML BOTTLE 2F7124

## (undated) DEVICE — PREP DURAPREP 26ML APL 8630

## (undated) DEVICE — DRSG STERI STRIP 1/2X4" R1547

## (undated) DEVICE — Device

## (undated) DEVICE — SOL NACL 0.9% IRRIG 3000ML BAG 2B7477

## (undated) DEVICE — SU PDS II 3-0 PS-1 18" Z683G

## (undated) DEVICE — SU VICRYL 1 CT-1 CR 8X18" J741D

## (undated) DEVICE — STRAP KNEE/BODY 31143004

## (undated) DEVICE — PREP POVIDONE-IODINE 7.5% SCRUB 4OZ BOTTLE MDS093945

## (undated) DEVICE — HOOD FLYTE W/PEELAWAY 408-800-100

## (undated) DEVICE — PREP SKIN SCRUB TRAY 4461A

## (undated) DEVICE — BASIN SET MAJOR

## (undated) DEVICE — ESU GROUND PAD ADULT W/CORD E7507

## (undated) DEVICE — BLADE KNIFE SURG 11 371111

## (undated) DEVICE — SUCTION IRR SYSTEM W/O TIP INTERPULSE HANDPIECE 0210-100-000

## (undated) DEVICE — DRSG TEGADERM ALGINATE AG 4X5" 90303

## (undated) DEVICE — SU VICRYL 2-0 CT-1 27" UND J259H

## (undated) DEVICE — DRSG TEGADERM 4X4 3/4" 1626W

## (undated) DEVICE — EYE PREP BETADINE 5% SOLUTION 30ML 0065-0411-30

## (undated) DEVICE — GLOVE PROTEXIS POWDER FREE 8.0 ORTHOPEDIC 2D73ET80

## (undated) DEVICE — BLADE SAW SAGITTAL STRK 18X90X1.27MM HD SYS 6 6118-127-090

## (undated) DEVICE — GLOVE PROTEXIS BLUE W/NEU-THERA 8.0  2D73EB80

## (undated) DEVICE — BONE CLEANING TIP INTERPULSE  0210-010-000

## (undated) DEVICE — SUCTION MANIFOLD NEPTUNE 2 SYS 4 PORT 0702-020-000

## (undated) DEVICE — LINEN BACK PACK 5440

## (undated) DEVICE — BONE CEMENT MIXEVAC HI VAC W/CARTRIDGE 0306-563-000

## (undated) DEVICE — DRAPE U-DRAPE 1015NSD NON-STERILE

## (undated) DEVICE — GLOVE PROTEXIS W/NEU-THERA 7.5  2D73TE75

## (undated) DEVICE — TOURNIQUET CUFF 30" REPRO BLUE 60-7070-105

## (undated) DEVICE — LINEN TOWEL PACK X5 5464

## (undated) DEVICE — SU VICRYL 0 CT-1 CR 8X27" UND JJ41G

## (undated) DEVICE — GOWN XLG DISP 9545

## (undated) DEVICE — ESU PENCIL W/SMOKE EVAC NEPTUNE STRYKER 0703-046-000

## (undated) RX ORDER — FENTANYL CITRATE 50 UG/ML
INJECTION, SOLUTION INTRAMUSCULAR; INTRAVENOUS
Status: DISPENSED
Start: 2021-03-12

## (undated) RX ORDER — TRIAMCINOLONE ACETONIDE 40 MG/ML
INJECTION, SUSPENSION INTRA-ARTICULAR; INTRAMUSCULAR
Status: DISPENSED
Start: 2020-09-09

## (undated) RX ORDER — DEXAMETHASONE SODIUM PHOSPHATE 4 MG/ML
INJECTION, SOLUTION INTRA-ARTICULAR; INTRALESIONAL; INTRAMUSCULAR; INTRAVENOUS; SOFT TISSUE
Status: DISPENSED
Start: 2021-03-12

## (undated) RX ORDER — LIDOCAINE HYDROCHLORIDE 5 MG/ML
INJECTION, SOLUTION INFILTRATION; INTRAVENOUS
Status: DISPENSED
Start: 2020-09-09

## (undated) RX ORDER — LIDOCAINE HYDROCHLORIDE 5 MG/ML
INJECTION, SOLUTION INFILTRATION; INTRAVENOUS
Status: DISPENSED
Start: 2018-12-10

## (undated) RX ORDER — CEFAZOLIN SODIUM 2 G/100ML
INJECTION, SOLUTION INTRAVENOUS
Status: DISPENSED
Start: 2021-03-12

## (undated) RX ORDER — TRANEXAMIC ACID 650 MG/1
TABLET ORAL
Status: DISPENSED
Start: 2021-03-12

## (undated) RX ORDER — LIDOCAINE HYDROCHLORIDE 5 MG/ML
INJECTION, SOLUTION INFILTRATION; INTRAVENOUS
Status: DISPENSED
Start: 2019-05-06

## (undated) RX ORDER — PROPOFOL 10 MG/ML
INJECTION, EMULSION INTRAVENOUS
Status: DISPENSED
Start: 2021-03-12

## (undated) RX ORDER — HYDROMORPHONE HYDROCHLORIDE 1 MG/ML
INJECTION, SOLUTION INTRAMUSCULAR; INTRAVENOUS; SUBCUTANEOUS
Status: DISPENSED
Start: 2021-03-12

## (undated) RX ORDER — CEFAZOLIN SODIUM 1 G/3ML
INJECTION, POWDER, FOR SOLUTION INTRAMUSCULAR; INTRAVENOUS
Status: DISPENSED
Start: 2021-03-12

## (undated) RX ORDER — KETOROLAC TROMETHAMINE 30 MG/ML
INJECTION, SOLUTION INTRAMUSCULAR; INTRAVENOUS
Status: DISPENSED
Start: 2021-03-12

## (undated) RX ORDER — TRIAMCINOLONE ACETONIDE 40 MG/ML
INJECTION, SUSPENSION INTRA-ARTICULAR; INTRAMUSCULAR
Status: DISPENSED
Start: 2020-02-03

## (undated) RX ORDER — LIDOCAINE HYDROCHLORIDE 5 MG/ML
INJECTION, SOLUTION INFILTRATION; INTRAVENOUS
Status: DISPENSED
Start: 2019-09-30

## (undated) RX ORDER — GLYCOPYRROLATE 0.2 MG/ML
INJECTION INTRAMUSCULAR; INTRAVENOUS
Status: DISPENSED
Start: 2021-03-12

## (undated) RX ORDER — TRIAMCINOLONE ACETONIDE 40 MG/ML
INJECTION, SUSPENSION INTRA-ARTICULAR; INTRAMUSCULAR
Status: DISPENSED
Start: 2019-09-30

## (undated) RX ORDER — TRIAMCINOLONE ACETONIDE 40 MG/ML
INJECTION, SUSPENSION INTRA-ARTICULAR; INTRAMUSCULAR
Status: DISPENSED
Start: 2019-05-06

## (undated) RX ORDER — LIDOCAINE HYDROCHLORIDE 5 MG/ML
INJECTION, SOLUTION INFILTRATION; INTRAVENOUS
Status: DISPENSED
Start: 2020-02-03

## (undated) RX ORDER — LIDOCAINE HYDROCHLORIDE 20 MG/ML
INJECTION, SOLUTION EPIDURAL; INFILTRATION; INTRACAUDAL; PERINEURAL
Status: DISPENSED
Start: 2021-03-12

## (undated) RX ORDER — ONDANSETRON 2 MG/ML
INJECTION INTRAMUSCULAR; INTRAVENOUS
Status: DISPENSED
Start: 2021-03-12

## (undated) RX ORDER — TRIAMCINOLONE ACETONIDE 40 MG/ML
INJECTION, SUSPENSION INTRA-ARTICULAR; INTRAMUSCULAR
Status: DISPENSED
Start: 2018-12-10

## (undated) RX ORDER — EPHEDRINE SULFATE 50 MG/ML
INJECTION, SOLUTION INTRAMUSCULAR; INTRAVENOUS; SUBCUTANEOUS
Status: DISPENSED
Start: 2021-03-12